# Patient Record
Sex: FEMALE | Race: WHITE | NOT HISPANIC OR LATINO | Employment: PART TIME | URBAN - METROPOLITAN AREA
[De-identification: names, ages, dates, MRNs, and addresses within clinical notes are randomized per-mention and may not be internally consistent; named-entity substitution may affect disease eponyms.]

---

## 2017-10-11 ENCOUNTER — OFFICE VISIT (OUTPATIENT)
Dept: INTERNAL MEDICINE | Facility: CLINIC | Age: 22
End: 2017-10-11
Payer: MEDICAID

## 2017-10-11 VITALS
BODY MASS INDEX: 29.37 KG/M2 | SYSTOLIC BLOOD PRESSURE: 98 MMHG | DIASTOLIC BLOOD PRESSURE: 62 MMHG | TEMPERATURE: 97.8 F | OXYGEN SATURATION: 97 % | WEIGHT: 172 LBS | HEART RATE: 84 BPM | HEIGHT: 64 IN

## 2017-10-11 DIAGNOSIS — R07.0 THROAT PAIN: ICD-10-CM

## 2017-10-11 DIAGNOSIS — J03.90 TONSILLITIS: Primary | ICD-10-CM

## 2017-10-11 LAB
DEPRECATED S PYO AG THROAT QL EIA: NORMAL
SPECIMEN SOURCE: NORMAL

## 2017-10-11 PROCEDURE — 87081 CULTURE SCREEN ONLY: CPT | Performed by: FAMILY MEDICINE

## 2017-10-11 PROCEDURE — 99213 OFFICE O/P EST LOW 20 MIN: CPT | Performed by: FAMILY MEDICINE

## 2017-10-11 PROCEDURE — 87880 STREP A ASSAY W/OPTIC: CPT | Performed by: FAMILY MEDICINE

## 2017-10-11 RX ORDER — CEPHALEXIN 500 MG/1
500 CAPSULE ORAL 3 TIMES DAILY
Qty: 30 CAPSULE | Refills: 0 | Status: SHIPPED | OUTPATIENT
Start: 2017-10-11 | End: 2017-11-16

## 2017-10-11 NOTE — NURSING NOTE
"Chief Complaint   Patient presents with     Pharyngitis     pt wants to be tested for strep has patches in her throat and is sore x 1 day       Initial BP 98/62 (BP Location: Right arm, Patient Position: Sitting, Cuff Size: Adult Large)  Pulse 84  Temp 97.8  F (36.6  C) (Oral)  Ht 5' 3.7\" (1.618 m)  Wt 172 lb (78 kg)  SpO2 97%  BMI 29.8 kg/m2 Estimated body mass index is 29.8 kg/(m^2) as calculated from the following:    Height as of this encounter: 5' 3.7\" (1.618 m).    Weight as of this encounter: 172 lb (78 kg).  Medication Reconciliation: complete    "

## 2017-10-11 NOTE — PROGRESS NOTES
"CHIEF COMPLAINT    ST      HISTORY    She has ST for 1 day. She noticed some white patches. She has some HA and body ache.     Patient Active Problem List   Diagnosis     Major depressive disorder, recurrent episode, severe (H)     Generalized anxiety disorder     Cannabis abuse     Esophageal reflux (GERD)     Current Outpatient Prescriptions   Medication Sig Dispense Refill     norethindrone-ethinyl estradiol (MICROGESTIN 1/20) 1-20 MG-MCG per tablet Take 1 tablet by mouth daily 3 Package 3       REVIEW OF SYSTEMS    No fever  No congestion  No GI upset  No rash      History reviewed. No pertinent past medical history.        EXAM  BP 98/62 (BP Location: Right arm, Patient Position: Sitting, Cuff Size: Adult Large)  Pulse 84  Temp 97.8  F (36.6  C) (Oral)  Ht 5' 3.7\" (1.618 m)  Wt 172 lb (78 kg)  SpO2 97%  BMI 29.8 kg/m2    Phar: 2+ tonsils moderately red with whitish exudate  Neck: moderate enlarged ant cervical nodes  Chest: neg  Skin: no rash    Rapid strep: neg      (J03.90) Tonsillitis  (primary encounter diagnosis)  Comment:   Plan: cephALEXin (KEFLEX) 500 MG capsule            (R07.0) Throat pain  Comment:   Plan: Strep, Rapid Screen, Beta strep group A culture            Saline gargles.  Ibu.    Patient advised to return for worsening or persistent symptoms.          "

## 2017-10-11 NOTE — MR AVS SNAPSHOT
"              After Visit Summary   10/11/2017    Azul Rojas    MRN: [id number]           Patient Information     Date Of Birth          1995        Visit Information        Provider Department      10/11/2017 4:00 PM Buck Hernandez MD [facility]        Today's Diagnoses     Tonsillitis    -  1    Throat pain           Follow-ups after your visit        Who to contact     If you have questions or need follow up information about today's clinic visit or your schedule please contact [facility] directly at [phone].  Normal or non-critical lab and imaging results will be communicated to you by [employer], letter or phone within 4 business days after the clinic has received the results. If you do not hear from us within 7 days, please contact the clinic through [employer] or phone. If you have a critical or abnormal lab result, we will notify you by phone as soon as possible.  Submit refill requests through [employer] or call your pharmacy and they will forward the refill request to us. Please allow 3 business days for your refill to be completed.          Additional Information About Your Visit        MyChart Information     [employer] lets you send messages to your doctor, view your test results, renew your prescriptions, schedule appointments and more. To sign up, go to [email or web address] . Click on \"Log in\" on the left side of the screen, which will take you to the Welcome page. Then click on \"Sign up Now\" on the right side of the page.     You will be asked to enter the access code listed below, as well as some personal information. Please follow the directions to create your username and password.     Your access code is: [email or web address]  Expires: 2018  4:44 PM     Your access code will  in 90 days. If you need help or a new code, please call your [facility] or [phone].        Care EveryWhere ID     This is your Care EveryWhere ID. This could be used " "by other organizations to access your [place] medical records  [id number]        Your Vitals Were     Pulse Temperature Height Pulse Oximetry BMI (Body Mass Index)       84 97.8  F (36.6  C) (Oral) 5' 3.7\" (1.618 m) 97% 29.8 kg/m2        Blood Pressure from Last 3 Encounters:   10/11/17 98/62   07/17/15 118/78   07/17/15 108/62    Weight from Last 3 Encounters:   10/11/17 172 lb (78 kg) (92 %)*   07/17/15 131 lb 9.6 oz (59.7 kg) (65 %)*   07/17/15 131 lb 12.8 oz (59.8 kg) (65 %)*     * Growth percentiles are based on [facility] 2-20 Years data.              We Performed the Following     Strep, Rapid Screen          Today's Medication Changes          These changes are accurate as of: 10/11/17  4:44 PM.  If you have any questions, ask your nurse or doctor.               Start taking these medicines.        Dose/Directions    cephALEXin 500 MG capsule   Commonly known as:  KEFLEX   Used for:  Tonsillitis   Started by:  Buck Hernandez MD        Dose:  500 mg   Take 1 capsule (500 mg) by mouth 3 times daily   Quantity:  30 capsule   Refills:  0            Where to get your medicines      These medications were sent to [facility] [place] [address] [place] [address] [facility] [address]     Phone:  [phone]     cephALEXin 500 MG capsule                Primary Care Provider    None Specified       No primary provider on file.        Equal Access to Services     [facility] JOHNNY AH: Hadii iris gallagher hadasho Sotorriali, waaxda luqadaha, qaybta kaalmada adeegyada, bren toussaint. So [facility] [phone].    ATENCIÓN: Si habla español, tiene a herbert disposición servicios gratuitos de asistencia lingüística. Llame al [phone].    We comply with applicable federal civil rights laws and [place] laws. We do not discriminate on the basis of race, color, national origin, age, disability, sex, sexual orientation, or gender identity.            Thank you!     Thank you " for choosing [facility]  for your care. Our goal is always to provide you with excellent care. Hearing back from our patients is one way we can continue to improve our services. Please take a few minutes to complete the written survey that you may receive in the mail after your visit with us. Thank you!             Your Updated Medication List - Protect others around you: Learn how to safely use, store and throw away your medicines at www.disposemymeds.org.          This list is accurate as of: 10/11/17  4:44 PM.  Always use your most recent med list.                   Brand Name Dispense Instructions for use Diagnosis    cephALEXin 500 MG capsule    KEFLEX    30 capsule    Take 1 capsule (500 mg) by mouth 3 times daily    Tonsillitis       norethindrone-ethinyl estradiol 1-20 MG-MCG per tablet    MICROGESTIN 1/20    3 Package    Take 1 tablet by mouth daily    Contraception

## 2017-10-12 LAB
BACTERIA SPEC CULT: NORMAL
SPECIMEN SOURCE: NORMAL

## 2017-11-16 ENCOUNTER — OFFICE VISIT (OUTPATIENT)
Dept: FAMILY MEDICINE | Facility: CLINIC | Age: 22
End: 2017-11-16
Payer: MEDICAID

## 2017-11-16 VITALS
TEMPERATURE: 97.8 F | HEART RATE: 99 BPM | DIASTOLIC BLOOD PRESSURE: 84 MMHG | SYSTOLIC BLOOD PRESSURE: 122 MMHG | BODY MASS INDEX: 28.85 KG/M2 | HEIGHT: 64 IN | WEIGHT: 169 LBS

## 2017-11-16 DIAGNOSIS — J03.90 TONSILLITIS: Primary | ICD-10-CM

## 2017-11-16 LAB
DEPRECATED S PYO AG THROAT QL EIA: NORMAL
SPECIMEN SOURCE: NORMAL

## 2017-11-16 PROCEDURE — 87081 CULTURE SCREEN ONLY: CPT | Performed by: FAMILY MEDICINE

## 2017-11-16 PROCEDURE — 99213 OFFICE O/P EST LOW 20 MIN: CPT | Performed by: FAMILY MEDICINE

## 2017-11-16 PROCEDURE — 87880 STREP A ASSAY W/OPTIC: CPT | Performed by: FAMILY MEDICINE

## 2017-11-16 RX ORDER — AMOXICILLIN AND CLAVULANATE POTASSIUM 500; 125 MG/1; MG/1
1 TABLET, FILM COATED ORAL 2 TIMES DAILY
Qty: 20 TABLET | Refills: 0 | Status: SHIPPED | OUTPATIENT
Start: 2017-11-16 | End: 2018-10-01

## 2017-11-16 NOTE — NURSING NOTE
"Chief Complaint   Patient presents with     Pharyngitis       Initial /84 (BP Location: Right arm, Patient Position: Sitting, Cuff Size: Adult Regular)  Pulse 99  Temp 97.8  F (36.6  C) (Oral)  Ht 5' 4\" (1.626 m)  Wt 169 lb (76.7 kg)  Breastfeeding? No  BMI 29.01 kg/m2 Estimated body mass index is 29.01 kg/(m^2) as calculated from the following:    Height as of this encounter: 5' 4\" (1.626 m).    Weight as of this encounter: 169 lb (76.7 kg).  Medication Reconciliation: complete     Brennon Love CMA            "

## 2017-11-16 NOTE — MR AVS SNAPSHOT
After Visit Summary   11/16/2017    Azul Rojas    MRN: [id number]           Patient Information     Date Of Birth          1995        Visit Information        Provider Department      11/16/2017 11:00 AM Linnea Day MD [facility]        Today's Diagnoses     Tonsillitis    -  1    Pharyngitis           Follow-ups after your visit        Additional Services     OTOLARYNGOLOGY REFERRAL       Your provider has referred you to: N: [facility] [phone]   [email or web address]    Please be aware that coverage of these services is subject to the terms and limitations of your health insurance plan.  Call member services at your health plan with any benefit or coverage questions.      Please bring the following with you to your appointment:    (1) Any X-Rays, CTs or MRIs which have been performed.  Contact the facility where they were done to arrange for  prior to your scheduled appointment.   (2) List of current medications  (3) This referral request   (4) Any documents/labs given to you for this referral                  Who to contact     If you have questions or need follow up information about today's clinic visit or your schedule please contact [facility] directly at [phone].  Normal or non-critical lab and imaging results will be communicated to you by MyChart, letter or phone within 4 business days after the clinic has received the results. If you do not hear from us within 7 days, please contact the clinic through MyChart or phone. If you have a critical or abnormal lab result, we will notify you by phone as soon as possible.  Submit refill requests through [employer] or call your pharmacy and they will forward the refill request to us. Please allow 3 business days for your refill to be completed.          Additional Information About Your Visit    "     MyChart Information     [employer] lets you send messages to your doctor, view your test results, renew your prescriptions, schedule appointments and more. To sign up, go to [email or web address] . Click on \"Log in\" on the left side of the screen, which will take you to the Welcome page. Then click on \"Sign up Now\" on the right side of the page.     You will be asked to enter the access code listed below, as well as some personal information. Please follow the directions to create your username and password.     Your access code is: [email or web address]  Expires: 2018  3:44 PM     Your access code will  in 90 days. If you need help or a new code, please call your [place] clinic or [phone].        Care EveryWhere ID     This is your Care EveryWhere ID. This could be used by other organizations to access your [place] medical records  [id number]        Your Vitals Were     Pulse Temperature Height Breastfeeding? BMI (Body Mass Index)       99 97.8  F (36.6  C) (Oral) 5' 4\" (1.626 m) No 29.01 kg/m2        Blood Pressure from Last 3 Encounters:   17 122/84   10/11/17 98/62   07/17/15 118/78    Weight from Last 3 Encounters:   17 169 lb (76.7 kg)   10/11/17 172 lb (78 kg) (92 %)*   07/17/15 131 lb 9.6 oz (59.7 kg) (65 %)*     * Growth percentiles are based on [facility] 2-20 Years data.              We Performed the Following     OTOLARYNGOLOGY REFERRAL     Rapid strep screen          Today's Medication Changes          These changes are accurate as of: 17 11:35 AM.  If you have any questions, ask your nurse or doctor.               Start taking these medicines.        Dose/Directions    amoxicillin-clavulanate 500-125 MG per tablet   Commonly known as:  AUGMENTIN   Used for:  Tonsillitis   Started by:  Linnea Day MD        Dose:  1 tablet   Take 1 tablet by mouth 2 times daily   Quantity:  20 tablet   Refills:  0         Stop taking these medicines if you haven't already. Please " contact your care team if you have questions.     cephALEXin 500 MG capsule   Commonly known as:  KEFLEX   Stopped by:  Linnea Day MD                Where to get your medicines      These medications were sent to [facility] - [place], [place] - [address]  [address] , [facility] [address]     Phone:  [phone]     amoxicillin-clavulanate 500-125 MG per tablet                Primary Care Provider    Physician No Ref-Primary       NO REF-PRIMARY PHYSICIAN        Equal Access to Services     WILLIE TURNER : Hadii aad ku hadasho Soomaali, waaxda luqadaha, qaybta kaalmada adeegyada, waxay idiin hayaan adeeg eliudarash lafelix . So [facility] [phone].    ATENCIÓN: Si habla español, tiene a herbert disposición servicios gratuitos de asistencia lingüística. [facility] [phone].    We comply with applicable federal civil rights laws and [place] laws. We do not discriminate on the basis of race, color, national origin, age, disability, sex, sexual orientation, or gender identity.            Thank you!     Thank you for choosing [facility]  for your care. Our goal is always to provide you with excellent care. Hearing back from our patients is one way we can continue to improve our services. Please take a few minutes to complete the written survey that you may receive in the mail after your visit with us. Thank you!             Your Updated Medication List - Protect others around you: Learn how to safely use, store and throw away your medicines at www.disposemymeds.org.          This list is accurate as of: 11/16/17 11:35 AM.  Always use your most recent med list.                   Brand Name Dispense Instructions for use Diagnosis    amoxicillin-clavulanate 500-125 MG per tablet    AUGMENTIN    20 tablet    Take 1 tablet by mouth 2 times daily    Tonsillitis       norethindrone-ethinyl estradiol 1-20 MG-MCG per tablet    MICROGESTIN 1/20    3 Package    Take 1 tablet by mouth  daily    Contraception

## 2017-11-17 LAB
BACTERIA SPEC CULT: NORMAL
SPECIMEN SOURCE: NORMAL

## 2018-10-01 ENCOUNTER — APPOINTMENT (OUTPATIENT)
Dept: GENERAL RADIOLOGY | Facility: CLINIC | Age: 23
End: 2018-10-01
Attending: NURSE PRACTITIONER
Payer: MEDICAID

## 2018-10-01 ENCOUNTER — HOSPITAL ENCOUNTER (EMERGENCY)
Facility: CLINIC | Age: 23
Discharge: HOME | End: 2018-10-01
Attending: NURSE PRACTITIONER | Admitting: NURSE PRACTITIONER
Payer: MEDICAID

## 2018-10-01 VITALS
OXYGEN SATURATION: 100 % | HEART RATE: 88 BPM | SYSTOLIC BLOOD PRESSURE: 117 MMHG | WEIGHT: 170 LBS | HEIGHT: 63 IN | TEMPERATURE: 98.3 F | RESPIRATION RATE: 20 BRPM | DIASTOLIC BLOOD PRESSURE: 84 MMHG | BODY MASS INDEX: 30.12 KG/M2

## 2018-10-01 DIAGNOSIS — J45.901 EXACERBATION OF ASTHMA, UNSPECIFIED ASTHMA SEVERITY, UNSPECIFIED WHETHER PERSISTENT: ICD-10-CM

## 2018-10-01 DIAGNOSIS — R06.2 WHEEZING: ICD-10-CM

## 2018-10-01 PROCEDURE — 71046 X-RAY EXAM CHEST 2 VIEWS: CPT

## 2018-10-01 PROCEDURE — 99284 EMERGENCY DEPT VISIT MOD MDM: CPT | Mod: 25

## 2018-10-01 PROCEDURE — 94640 AIRWAY INHALATION TREATMENT: CPT

## 2018-10-01 PROCEDURE — 25000125 ZZHC RX 250: Performed by: NURSE PRACTITIONER

## 2018-10-01 RX ORDER — IPRATROPIUM BROMIDE AND ALBUTEROL SULFATE 2.5; .5 MG/3ML; MG/3ML
3 SOLUTION RESPIRATORY (INHALATION) ONCE
Status: COMPLETED | OUTPATIENT
Start: 2018-10-01 | End: 2018-10-01

## 2018-10-01 RX ORDER — PREDNISONE 20 MG/1
40 TABLET ORAL DAILY
Qty: 10 TABLET | Refills: 0 | Status: SHIPPED | OUTPATIENT
Start: 2018-10-01 | End: 2018-10-06

## 2018-10-01 RX ORDER — PREDNISONE 20 MG/1
60 TABLET ORAL ONCE
Status: COMPLETED | OUTPATIENT
Start: 2018-10-01 | End: 2018-10-01

## 2018-10-01 RX ORDER — ALBUTEROL SULFATE 90 UG/1
2-4 AEROSOL, METERED RESPIRATORY (INHALATION)
Qty: 1 INHALER | Refills: 1 | Status: SHIPPED | OUTPATIENT
Start: 2018-10-01 | End: 2018-12-02

## 2018-10-01 RX ORDER — IPRATROPIUM BROMIDE AND ALBUTEROL SULFATE 2.5; .5 MG/3ML; MG/3ML
6 SOLUTION RESPIRATORY (INHALATION) ONCE
Status: COMPLETED | OUTPATIENT
Start: 2018-10-01 | End: 2018-10-01

## 2018-10-01 RX ADMIN — IPRATROPIUM BROMIDE AND ALBUTEROL SULFATE 3 ML: .5; 3 SOLUTION RESPIRATORY (INHALATION) at 11:13

## 2018-10-01 RX ADMIN — PREDNISONE 60 MG: 20 TABLET ORAL at 11:12

## 2018-10-01 RX ADMIN — IPRATROPIUM BROMIDE AND ALBUTEROL SULFATE 3 ML: .5; 3 SOLUTION RESPIRATORY (INHALATION) at 11:46

## 2018-10-01 ASSESSMENT — ENCOUNTER SYMPTOMS
COUGH: 1
VOMITING: 0
NAUSEA: 0
DYSURIA: 0
SHORTNESS OF BREATH: 1
FREQUENCY: 0
FEVER: 1

## 2018-10-01 NOTE — ED PROVIDER NOTES
"  History     Chief Complaint:  Trouble Breathing and Cough    HPI   Azul Rojas is a 22 year old female smoker who presents to the emergency department today for evaluation of trouble breathing and cough. The patient reports that for about 1 week she has had increasing trouble breathing, fever, and productive cough, which she states she believes it could be bronchitis or pneumonia. She notes a history of asthma and has been using her sisters inhaler but has not had relief.    Allergies:  No Known Drug Allergies    Medications:    Mucinex  Microgestin    Past Medical History:    Medical history reviewed. No pertinent medical history.    Past Surgical History:    Surgical history reviewed. No pertinent surgical history.    Family History:    Family history reviewed. No pertinent family history.     Social History:  Smoking Status: Current Every Day Smoker  Smokeless Tobacco: Former User  Alcohol Use: Negative  Marital Status:  Single     Review of Systems   Constitutional: Positive for fever (subjective).   Respiratory: Positive for cough and shortness of breath.    Cardiovascular: Negative for chest pain.   Gastrointestinal: Negative for nausea and vomiting.   Genitourinary: Negative for dysuria and frequency.   All other systems reviewed and are negative.      Physical Exam     Patient Vitals for the past 24 hrs:   BP Temp Temp src Pulse Resp SpO2 Height Weight   10/01/18 1152 117/84 - - 88 20 100 % - -   10/01/18 1145 - - - - - 100 % - -   10/01/18 1141 - - - - - 98 % - -   10/01/18 1140 (!) 125/91 - - - - - - -   10/01/18 1031 (!) 144/93 98.3  F (36.8  C) Temporal 90 16 96 % 1.6 m (5' 3\") 77.1 kg (170 lb)     Physical Exam  General: Alert, Mild  discomfort, well kept. Tearful.   Eyes: PERRL, conjunctivae pink no scleral icterus or conjunctival injection  ENT:   Moist mucus membranes, posterior oropharynx clear without erythema or exudates, No lymphadenopathy, Normal voice  Resp:  Bilateral tight wheezing " "throughout. Minimal increased work of breathing. No crackles/rubs/. Good air movement   Reevaluation: minimal wheezing, \"feels better\"  CV:  Normal rate and rhythm, no murmurs/rubs/gallops  GI:  Abdomen soft and non-distended.  Normoactive BS.  No tenderness, guarding or rebound, No masses  Skin:  Warm, dry.  No rashes or petechiae  Musculoskeletal: No peripheral edema or calf tenderness, Normal gross ROM   Neuro: Alert and oriented to person/place/time, normal sensation  Psychiatric: Normal affect, cooperative, good eye contact    Emergency Department Course   Imaging:  Radiology findings were communicated with the patient who voiced understanding of the findings.    XR Chest 2 Views  No acute pulmonary disease.  Reading per radiology     Interventions:  1112 Deltasone 60 mg PO  1113 DuoNeb 3 ml Nebulization  1146 DuoNeb 3 ml Nebulization    Emergency Department Course:    1036 Nursing notes and vitals reviewed.    1037 I performed an exam of the patient as documented above.     1115 The patient was sent for a XR Chest 2 Views while in the emergency department, results above.     1137 The patient was rechecked and updated.     1140 I personally reviewed the imaging results with the patient and answered all related questions prior to discharge.    Impression & Plan      Medical Decision Making:  Azul Rojas is a 22 year old female who presents to the emergency department today for evaluation of shortness of breath, cough, and wheezing.  Signs and symptoms are consistent with asthma exacerbation.  A broad differential was considered including foreign body, pneumonia, bronchitis, reactive airway disease, pneumothorax, cardiac equivalent, viral induced wheezing, etc. The patient had a negative chest xray. Patient feels improved after interventions here in emergency room.  There are no signs at this point of any serious etiologies including those mentioned above.  No indication for hospitalization at this time " including no hypoxia, no marked increase in respiratory rate, minimal to no retractions. Supportive outpatient management is indicated, medications for discharge noted above.  Close followup with primary care physician in the next 2-3 days if no improvement or sooner if worsening.  Return  To UR/ER if increased wheezing, progressive shortness of breath, develops fever greater than 102, or for other concerns.     Diagnosis:    ICD-10-CM    1. Exacerbation of asthma, unspecified asthma severity, unspecified whether persistent J45.901    2. Wheezing R06.2      Disposition:   The patient is discharged to home.    Discharge Medication List as of 10/1/2018 11:48 AM      START taking these medications    Details   albuterol (PROAIR HFA/PROVENTIL HFA/VENTOLIN HFA) 108 (90 Base) MCG/ACT inhaler Inhale 2-4 puffs into the lungs every 2 hours as needed for shortness of breath / dyspnea, Disp-1 Inhaler, R-1, Local PrintWITH SPACER      predniSONE (DELTASONE) 20 MG tablet Take 2 tablets (40 mg) by mouth daily for 5 days, Disp-10 tablet, R-0, Local Print           Scribe Disclosure:  Edie JUAN, am serving as a scribe at 10:41 AM on 10/1/2018 to document services personally performed by Mynor Taylor APRN CNP based on my observations and the provider's statements to me.    [facility] EMERGENCY DEPARTMENT       Mynor Taylor APRN CNP  10/01/18 9108

## 2018-10-01 NOTE — DISCHARGE INSTRUCTIONS
Discharge Instructions  Asthma    Asthma is a condition causing narrowing and inflammation of the airways that can make it hard to breathe.  Asthma can also cause cough, wheezing, noisy breathing and tightness in the chest.  Asthma can be brought on or  triggered  by many things, including dust, mold, pollen, cigarette smoke, exercise, stress and infections (like the common cold).     Generally, every Emergency Department visit should have a follow-up clinic visit with either a primary or a specialty clinic/provider. Please follow-up as instructed by your emergency provider today.    Return to the Emergency Department if:    Your breathing gets worse.    You need to use your inhaler more often than every 4 hours, or cannot get relief from your inhaler.    You are very weak, or feel much more ill.    You develop new symptoms, such as chest pain.    You cough up blood.    You are vomiting (throwing up) enough that you cannot keep fluids or your medicine down.    What can I do to help myself?    Fill any prescriptions the provider gave you and take them right away--especially antibiotics. Be sure to finish the whole antibiotic prescription.    You may be given a prescription for an inhaler, which can help loosen tight air passages.  Use this as needed, but not more often than directed. Inhalers work much better when used with a spacer.     You may be given a prescription for a steroid to reduce inflammation. Used long-term, these can have some side effects, but for short-term use they are safe. You may notice restlessness or increased appetite (eating more).        You may use non-prescription cough or cold medicines. Cough medicines may help, but do not make the cough go away completely.     Avoid smoke, because this can make you feel worse. If you smoke, this may be a good time to quit! Consider using nicotine lozenges, gum, or patches to reduce cravings.     If you have a fever, Tylenol  (acetaminophen), Motrin   (ibuprofen), or Advil  (ibuprofen), may help bring fever down and may help you feel more comfortable. Be sure to read and follow the package directions, and ask your provider if you have questions.    Be sure to get your flu shot each year.  For certain age groups, the pneumonia shot can help prevent pneumonia.  It is important that you follow up with your regular provider, to be sure that you are improving from this spell (an acute asthma exacerbation), and also to do what you can to keep from having trouble again. Sometimes you need long-term medicines to keep your asthma under control.   If you were given a prescription for medicine here today, be sure to read all of the information (including the package insert) that comes with your prescription.  This will include important information about the medicine, its side effects, and any warnings that you need to know about.  The pharmacist who fills the prescription can provide more information and answer questions you may have about the medicine.  If you have questions or concerns that the pharmacist cannot address, please call or return to the Emergency Department.   Remember that you can always come back to the Emergency Department if you are not able to see your regular provider in the amount of time listed above, if you get any new symptoms, or if there is anything that worries you.

## 2018-10-01 NOTE — ED AVS SNAPSHOT
[facility] Emergency Department    [address]    [facility] [address]    Phone:  [phone]    Fax:  [phone]                                       Azul Rojas   MRN: [id number]    Department:  [facility] Emergency Department   Date of Visit:  10/1/2018           After Visit Summary Signature Page     I have received my discharge instructions, and my questions have been answered. I have discussed any challenges I see with this plan with the nurse or doctor.    ..........................................................................................................................................  Patient/Patient Representative Signature      ..........................................................................................................................................  Patient Representative Print Name and Relationship to Patient    ..................................................               ................................................  Date                                   Time    ..........................................................................................................................................  Reviewed by Signature/Title    ...................................................              ..............................................  Date                                               Time          22EPIC Rev 08/18

## 2018-10-01 NOTE — ED AVS SNAPSHOT
[facility] Emergency Department    [address]    [facility] [address]    Phone:  [phone]    Fax:  [phone]                                       Azul Rojas   MRN: [id number]    Department:  [facility] Emergency Department   Date of Visit:  10/1/2018           Patient Information     Date Of Birth          1995        Your diagnoses for this visit were:     Exacerbation of asthma, unspecified asthma severity, unspecified whether persistent     Wheezing        You were seen by Mynor Taylor APRN CNP.      Follow-up Information     Follow up with [facility] In 5 days.    Why:  if continuned symptoms or sooner if worsening    Contact information:    [address]  [facility] [address]  [phone]          Follow up with [facility] Emergency Department.    Specialty:  EMERGENCY MEDICINE    Why:  If symptoms worsen    Contact information:    [address]  [facility] [id number] [phone]        Discharge Instructions       Discharge Instructions  Asthma    Asthma is a condition causing narrowing and inflammation of the airways that can make it hard to breathe.  Asthma can also cause cough, wheezing, noisy breathing and tightness in the chest.  Asthma can be brought on or  triggered  by many things, including dust, mold, pollen, cigarette smoke, exercise, stress and infections (like the common cold).     Generally, every Emergency Department visit should have a follow-up clinic visit with either a primary or a specialty clinic/provider. Please follow-up as instructed by your emergency provider today.    Return to the Emergency Department if:    Your breathing gets worse.    You need to use your inhaler more often than every 4 hours, or cannot get relief from your inhaler.    You are very weak, or feel much more ill.    You develop new symptoms, such as chest pain.    You cough up blood.    You are  vomiting (throwing up) enough that you cannot keep fluids or your medicine down.    What can I do to help myself?    Fill any prescriptions the provider gave you and take them right away--especially antibiotics. Be sure to finish the whole antibiotic prescription.    You may be given a prescription for an inhaler, which can help loosen tight air passages.  Use this as needed, but not more often than directed. Inhalers work much better when used with a spacer.     You may be given a prescription for a steroid to reduce inflammation. Used long-term, these can have some side effects, but for short-term use they are safe. You may notice restlessness or increased appetite (eating more).        You may use non-prescription cough or cold medicines. Cough medicines may help, but do not make the cough go away completely.     Avoid smoke, because this can make you feel worse. If you smoke, this may be a good time to quit! Consider using nicotine lozenges, gum, or patches to reduce cravings.     If you have a fever, Tylenol  (acetaminophen), Motrin  (ibuprofen), or Advil  (ibuprofen), may help bring fever down and may help you feel more comfortable. Be sure to read and follow the package directions, and ask your provider if you have questions.    Be sure to get your flu shot each year.  For certain age groups, the pneumonia shot can help prevent pneumonia.  It is important that you follow up with your regular provider, to be sure that you are improving from this spell (an acute asthma exacerbation), and also to do what you can to keep from having trouble again. Sometimes you need long-term medicines to keep your asthma under control.   If you were given a prescription for medicine here today, be sure to read all of the information (including the package insert) that comes with your prescription.  This will include important information about the medicine, its side effects, and any warnings that you need to know about.  The  pharmacist who fills the prescription can provide more information and answer questions you may have about the medicine.  If you have questions or concerns that the pharmacist cannot address, please call or return to the Emergency Department.   Remember that you can always come back to the Emergency Department if you are not able to see your regular provider in the amount of time listed above, if you get any new symptoms, or if there is anything that worries you.    24 Hour Appointment Hotline       To make an appointment at any [facility], call [phone] ([phone]). If you don't have a family doctor or clinic, we will help you find one. [facility] clinics are conveniently located to serve the needs of you and your family.             Review of your medicines      START taking        Dose / Directions Last dose taken    albuterol 108 (90 Base) MCG/ACT inhaler   Commonly known as:  PROAIR HFA/PROVENTIL HFA/VENTOLIN HFA   Dose:  2-4 puff   Quantity:  1 Inhaler        Inhale 2-4 puffs into the lungs every 2 hours as needed for shortness of breath / dyspnea   Refills:  1        predniSONE 20 MG tablet   Commonly known as:  DELTASONE   Dose:  40 mg   Quantity:  10 tablet        Take 2 tablets (40 mg) by mouth daily for 5 days   Refills:  0          Our records show that you are taking the medicines listed below. If these are incorrect, please call your family doctor or clinic.        Dose / Directions Last dose taken    dextromethorphan-guaiFENesin  MG per 12 hr tablet   Commonly known as:  MUCINEX DM   Dose:  1 tablet        Take 1 tablet by mouth every 12 hours   Refills:  0        norethindrone-ethinyl estradiol 1-20 MG-MCG per tablet   Commonly known as:  MICROGESTIN 1/20   Dose:  1 tablet   Quantity:  3 Package        Take 1 tablet by mouth daily   Refills:  3                Prescriptions were sent or printed at these locations (2 Prescriptions)                   Other Prescriptions                 Printed at Department/Unit printer (2 of 2)         albuterol (PROAIR HFA/PROVENTIL HFA/VENTOLIN HFA) 108 (90 Base) MCG/ACT inhaler               predniSONE (DELTASONE) 20 MG tablet                Procedures and tests performed during your visit     XR Chest 2 Views      Orders Needing Specimen Collection     None      Pending Results     No orders found from 9/29/2018 to 10/2/2018.            Pending Culture Results     No orders found from 9/29/2018 to 10/2/2018.            Pending Results Instructions     If you had any lab results that were not finalized at the time of your Discharge, you can call the ED Lab Result RN at [phone]. You will be contacted by this team for any positive Lab results or changes in treatment. The nurses are available 7 days a week from 10A to 6:30P.  You can leave a message 24 hours per day and they will return your call.        Test Results From Your Hospital Stay        10/1/2018 11:23 AM      Narrative     XR CHEST 2 VW 10/1/2018 11:18 AM     HISTORY: cough, sob;      COMPARISON: None available        Impression     IMPRESSION:  No acute pulmonary disease.    ROWENA JAMISON MD                Clinical Quality Measure: Blood Pressure Screening     Your blood pressure was checked while you were in the emergency department today. The last reading we obtained was  BP: (!) 125/91 . Please read the guidelines below about what these numbers mean and what you should do about them.  If your systolic blood pressure (the top number) is less than 120 and your diastolic blood pressure (the bottom number) is less than 80, then your blood pressure is normal. There is nothing more that you need to do about it.  If your systolic blood pressure (the top number) is 120-139 or your diastolic blood pressure (the bottom number) is 80-89, your blood pressure may be higher than it should be. You should have your blood pressure rechecked within a year by a primary care provider.  If your systolic blood  "pressure (the top number) is 140 or greater or your diastolic blood pressure (the bottom number) is 90 or greater, you may have high blood pressure. High blood pressure is treatable, but if left untreated over time it can put you at risk for heart attack, stroke, or kidney failure. You should have your blood pressure rechecked by a primary care provider within the next 4 weeks.  If your provider in the emergency department today gave you specific instructions to follow-up with your doctor or provider even sooner than that, you should follow that instruction and not wait for up to 4 weeks for your follow-up visit.        Thank you for choosing [facility]       Thank you for choosing [facility] for your care. Our goal is always to provide you with excellent care. Hearing back from our patients is one way we can continue to improve our services. Please take a few minutes to complete the written survey that you may receive in the mail after you visit with us. Thank you!        [employer] Information     [employer] lets you send messages to your doctor, view your test results, renew your prescriptions, schedule appointments and more. To sign up, go to [email or web address] . Click on \"Log in\" on the left side of the screen, which will take you to the Welcome page. Then click on \"Sign up Now\" on the right side of the page.     You will be asked to enter the access code listed below, as well as some personal information. Please follow the directions to create your username and password.     Your access code is: [email or web address]  Expires: 2018 11:47 AM     Your access code will  in 90 days. If you need help or a new code, please call your [facility] clinic or [phone].        Care EveryWhere ID     This is your Care EveryWhere ID. This could be used by other organizations to access your [facility] medical records  [id number]        Equal Access to Services     WILLIE TURNER : dayanara Boucher, " bren azul ah. So [facility] [phone].    ATENCIÓN: Si habla damonañol, tiene a herbert disposición servicios gratuitos de asistencia lingüística. Llame al [phone].    We comply with applicable federal civil rights laws and [place] laws. We do not discriminate on the basis of race, color, national origin, age, disability, sex, sexual orientation, or gender identity.            After Visit Summary       This is your record. Keep this with you and show to your community pharmacist(s) and doctor(s) at your next visit.

## 2018-12-02 ENCOUNTER — APPOINTMENT (OUTPATIENT)
Dept: GENERAL RADIOLOGY | Facility: CLINIC | Age: 23
End: 2018-12-02
Attending: EMERGENCY MEDICINE
Payer: MEDICAID

## 2018-12-02 ENCOUNTER — HOSPITAL ENCOUNTER (EMERGENCY)
Facility: CLINIC | Age: 23
Discharge: HOME | End: 2018-12-02
Attending: EMERGENCY MEDICINE | Admitting: EMERGENCY MEDICINE
Payer: MEDICAID

## 2018-12-02 VITALS
OXYGEN SATURATION: 99 % | SYSTOLIC BLOOD PRESSURE: 140 MMHG | TEMPERATURE: 98 F | HEART RATE: 90 BPM | DIASTOLIC BLOOD PRESSURE: 94 MMHG

## 2018-12-02 DIAGNOSIS — R07.9 ACUTE CHEST PAIN: ICD-10-CM

## 2018-12-02 DIAGNOSIS — R05.9 COUGH: ICD-10-CM

## 2018-12-02 DIAGNOSIS — R06.02 SOB (SHORTNESS OF BREATH): ICD-10-CM

## 2018-12-02 LAB
B-HCG FREE SERPL-ACNC: <5 IU/L
D DIMER PPP FEU-MCNC: 0.4 UG/ML FEU (ref 0–0.5)

## 2018-12-02 PROCEDURE — 85379 FIBRIN DEGRADATION QUANT: CPT | Performed by: EMERGENCY MEDICINE

## 2018-12-02 PROCEDURE — 71046 X-RAY EXAM CHEST 2 VIEWS: CPT

## 2018-12-02 PROCEDURE — 93005 ELECTROCARDIOGRAM TRACING: CPT

## 2018-12-02 PROCEDURE — 84702 CHORIONIC GONADOTROPIN TEST: CPT

## 2018-12-02 PROCEDURE — 99285 EMERGENCY DEPT VISIT HI MDM: CPT | Mod: 25

## 2018-12-02 RX ORDER — ALBUTEROL SULFATE 90 UG/1
2-4 AEROSOL, METERED RESPIRATORY (INHALATION)
Qty: 1 INHALER | Refills: 1 | Status: SHIPPED | OUTPATIENT
Start: 2018-12-02 | End: 2020-03-19

## 2018-12-02 RX ORDER — PREDNISONE 20 MG/1
TABLET ORAL
Qty: 10 TABLET | Refills: 0 | Status: SHIPPED | OUTPATIENT
Start: 2018-12-02 | End: 2019-11-23

## 2018-12-02 ASSESSMENT — ENCOUNTER SYMPTOMS
VOMITING: 0
FEVER: 0
SHORTNESS OF BREATH: 1
COUGH: 1
DIARRHEA: 0

## 2018-12-02 NOTE — ED AVS SNAPSHOT
[facility] Emergency Department    [address]    [facility] [address]    Phone:  [phone]    Fax:  [phone]                                       Azul Rojas   MRN: [id number]    Department:  [facility] Emergency Department   Date of Visit:  12/2/2018           Patient Information     Date Of Birth          1995        Your diagnoses for this visit were:     SOB (shortness of breath)     Cough     Acute chest pain        You were seen by Loraine Huang MD.      Follow-up Information     Follow up with [facility]. Schedule an appointment as soon as possible for a visit in 1 week.    Specialties:  Sports Medicine, Pain Management, Obstetrics & Gynecology, Pediatrics, Internal Medicine, Nephrology    Contact information:    [address] [place] [address]  [facility] [address] [phone]        Discharge Instructions       *You may resume diet and activities as tolerated.  *Take medications as prescribed.  Prednisone and albuterol as directed. Continue your current medications  *Follow-up with your primary doctor in the next 1-2 days for a recheck.  *Return if you develop difficulty in breathing, require your inhaler more frequently than every four hours, faint or feel like you will faint or become worse in any way.    Discharge Instructions  Asthma    Asthma is a condition causing narrowing and inflammation of the airways that can make it hard to breathe.  Asthma can also cause cough, wheezing, noisy breathing and tightness in the chest.  Asthma can be brought on or  triggered  by many things, including dust, mold, pollen, cigarette smoke, exercise, stress and infections (like the common cold).     Generally, every Emergency Department visit should have a follow-up clinic visit with either a primary or a specialty clinic/provider. Please follow-up as instructed by your emergency provider today.    Return to the Emergency  Department if:    Your breathing gets worse.    You need to use your inhaler more often than every 4 hours, or cannot get relief from your inhaler.    You are very weak, or feel much more ill.    You develop new symptoms, such as chest pain.    You cough up blood.    You are vomiting (throwing up) enough that you cannot keep fluids or your medicine down.    What can I do to help myself?    Fill any prescriptions the provider gave you and take them right away--especially antibiotics. Be sure to finish the whole antibiotic prescription.    You may be given a prescription for an inhaler, which can help loosen tight air passages.  Use this as needed, but not more often than directed. Inhalers work much better when used with a spacer.     You may be given a prescription for a steroid to reduce inflammation. Used long-term, these can have some side effects, but for short-term use they are safe. You may notice restlessness or increased appetite (eating more).        You may use non-prescription cough or cold medicines. Cough medicines may help, but do not make the cough go away completely.     Avoid smoke, because this can make you feel worse. If you smoke, this may be a good time to quit! Consider using nicotine lozenges, gum, or patches to reduce cravings.     If you have a fever, Tylenol  (acetaminophen), Motrin  (ibuprofen), or Advil  (ibuprofen), may help bring fever down and may help you feel more comfortable. Be sure to read and follow the package directions, and ask your provider if you have questions.    Be sure to get your flu shot each year.  For certain age groups, the pneumonia shot can help prevent pneumonia.  It is important that you follow up with your regular provider, to be sure that you are improving from this spell (an acute asthma exacerbation), and also to do what you can to keep from having trouble again. Sometimes you need long-term medicines to keep your asthma under control.   If you were given a  prescription for medicine here today, be sure to read all of the information (including the package insert) that comes with your prescription.  This will include important information about the medicine, its side effects, and any warnings that you need to know about.  The pharmacist who fills the prescription can provide more information and answer questions you may have about the medicine.  If you have questions or concerns that the pharmacist cannot address, please call or return to the Emergency Department.   Remember that you can always come back to the Emergency Department if you are not able to see your regular provider in the amount of time listed above, if you get any new symptoms, or if there is anything that worries you.      24 Hour Appointment Hotline       To make an appointment at any [facility], call [phone] ([phone]). If you don't have a family doctor or clinic, we will help you find one. [facility] clinics are conveniently located to serve the needs of you and your family.             Review of your medicines      START taking        Dose / Directions Last dose taken    predniSONE 20 MG tablet   Commonly known as:  DELTASONE   Quantity:  10 tablet        Take two tablets (= 40mg) each day for 5 (five) days   Refills:  0          Our records show that you are taking the medicines listed below. If these are incorrect, please call your family doctor or clinic.        Dose / Directions Last dose taken    albuterol 108 (90 Base) MCG/ACT inhaler   Commonly known as:  PROAIR HFA/PROVENTIL HFA/VENTOLIN HFA   Dose:  2-4 puff   Quantity:  1 Inhaler        Inhale 2-4 puffs into the lungs every 2 hours as needed for shortness of breath / dyspnea   Refills:  1        norethindrone-ethinyl estradiol 1-20 MG-MCG tablet   Commonly known as:  MICROGESTIN 1/20   Dose:  1 tablet   Quantity:  3 Package        Take 1 tablet by mouth daily   Refills:  3                Prescriptions were sent or printed  at these locations (1 Prescription)                   Other Prescriptions                Printed at Department/Unit printer (1 of 1)         predniSONE (DELTASONE) 20 MG tablet                Procedures and tests performed during your visit     Chest XR,  PA & LAT    D dimer quantitative    EKG 12 lead    ISTAT HCG Quantitative Pregnancy Nursing POCT    ISTAT HCG Quantitative Pregnancy POCT      Orders Needing Specimen Collection     None      Pending Results     No orders found from 11/30/2018 to 12/3/2018.            Pending Culture Results     No orders found from 11/30/2018 to 12/3/2018.            Pending Results Instructions     If you had any lab results that were not finalized at the time of your Discharge, you can call the ED Lab Result RN at [phone]. You will be contacted by this team for any positive Lab results or changes in treatment. The nurses are available 7 days a week from 10A to 6:30P.  You can leave a message 24 hours per day and they will return your call.        Test Results From Your Hospital Stay        12/2/2018 10:13 PM      Component Results     Component Value Ref Range & Units Status    D Dimer 0.4 0.0 - 0.50 ug/ml FEU Final    This D-dimer assay is intended for use in conjunction with a clinical pretest   probability assessment model to exclude pulmonary embolism (PE) and deep   venous thrombosis (DVT) in outpatients suspected of PE or DVT. The cut-off   value is 0.5 ug/mL FEU.           12/2/2018 10:45 PM      Narrative     CHEST TWO VIEW   12/2/2018 10:26 PM     HISTORY: Shortness of breath.     COMPARISON: 10/1/2018        Impression     IMPRESSION: Lungs are well-inflated and clear. Heart size is normal.    NICOLE NELSON MD         12/2/2018 10:08 PM      Component Results     Component Value Ref Range & Units Status    HCG Quantitative Serum <5.0 <5.0 IU/L Final                Clinical Quality Measure: Blood Pressure Screening     Your blood pressure was checked while you  "were in the emergency department today. The last reading we obtained was  BP: (!) 140/94 . Please read the guidelines below about what these numbers mean and what you should do about them.  If your systolic blood pressure (the top number) is less than 120 and your diastolic blood pressure (the bottom number) is less than 80, then your blood pressure is normal. There is nothing more that you need to do about it.  If your systolic blood pressure (the top number) is 120-139 or your diastolic blood pressure (the bottom number) is 80-89, your blood pressure may be higher than it should be. You should have your blood pressure rechecked within a year by a primary care provider.  If your systolic blood pressure (the top number) is 140 or greater or your diastolic blood pressure (the bottom number) is 90 or greater, you may have high blood pressure. High blood pressure is treatable, but if left untreated over time it can put you at risk for heart attack, stroke, or kidney failure. You should have your blood pressure rechecked by a primary care provider within the next 4 weeks.  If your provider in the emergency department today gave you specific instructions to follow-up with your doctor or provider even sooner than that, you should follow that instruction and not wait for up to 4 weeks for your follow-up visit.        Thank you for choosing [facility]       Thank you for choosing [facility] for your care. Our goal is always to provide you with excellent care. Hearing back from our patients is one way we can continue to improve our services. Please take a few minutes to complete the written survey that you may receive in the mail after you visit with us. Thank you!        [employer] Information     [employer] lets you send messages to your doctor, view your test results, renew your prescriptions, schedule appointments and more. To sign up, go to [email or web address] . Click on \"Log in\" on the left side of the screen, which will " "take you to the Welcome page. Then click on \"Sign up Now\" on the right side of the page.     You will be asked to enter the access code listed below, as well as some personal information. Please follow the directions to create your username and password.     Your access code is: [email or web address]  Expires: 2018 10:47 AM     Your access code will  in 90 days. If you need help or a new code, please call your [facility] clinic or [phone].        Care EveryWhere ID     This is your Care EveryWhere ID. This could be used by other organizations to access your [facility] medical records  [id number]        Equal Access to Services     WILLIE TURNER : Leon Miles, dayanara wilson, kalyan rahman, bren toussaint. So [facility] [phone].    ATENCIÓN: Si habla español, tiene a herbert disposición servicios gratuitos de asistencia lingüística. Llame al [phone].    We comply with applicable federal civil rights laws and [place] laws. We do not discriminate on the basis of race, color, national origin, age, disability, sex, sexual orientation, or gender identity.            After Visit Summary       This is your record. Keep this with you and show to your community pharmacist(s) and doctor(s) at your next visit.                  "

## 2018-12-02 NOTE — ED AVS SNAPSHOT
[facility] Emergency Department    [address]    [facility] [address]    Phone:  [phone]    Fax:  [phone]                                       Azul Rojas   MRN: [id number]    Department:  [facility] Emergency Department   Date of Visit:  12/2/2018           After Visit Summary Signature Page     I have received my discharge instructions, and my questions have been answered. I have discussed any challenges I see with this plan with the nurse or doctor.    ..........................................................................................................................................  Patient/Patient Representative Signature      ..........................................................................................................................................  Patient Representative Print Name and Relationship to Patient    ..................................................               ................................................  Date                                   Time    ..........................................................................................................................................  Reviewed by Signature/Title    ...................................................              ..............................................  Date                                               Time          22EPIC Rev 08/18

## 2018-12-03 LAB — INTERPRETATION ECG - MUSE: NORMAL

## 2018-12-03 NOTE — ED PROVIDER NOTES
History     Chief Complaint:  Shortness of Breath      HPI   Azul Rojas is a 23 year old female, with a history of asthma, who presents with shortness of breath which was onset today. She was in the ER about two weeks ago and was prescribed an inhaler which she has been using without only temporary relief of her symptoms. Patient is coughing up some phlegm which she states is sometimes clear and sometimes green. Patient notes chest pain with deep breaths. She notes nausea over the past week. A few of her coworkers have bronchitis her boyfriend reports that she had what appeared to be an asthma attack earlier today. Patient denies vomiting, diarrhea, leg swelling and fever. Patient asked for a minimal workup due to the fact that she doesn't have insurance and does not wish to receive a large bill.  She was comfortable with the plan for a chest x-ray, and EKG and a d-dimer.    Cardiac Risk Factors   Sex: Female   Tobacco: Negative   Hypertension: Negative  Diabetes: Negative  Hyperlipidemia: Negative  Family History: Negative    PE/DVT Risk Factors   Personal History: Negative  Recent Travel: Negative   Recent Surgery/Hospitalization: Negative  Tobacco: Negative  Family History: Negative  Hormone Use: Positive  Cancer: Negative  Trauma: Negative      Allergies:  No known drug allergies.    Medications:    Albuterol  Microgestin     Past Medical History:    Anxiety   Asthma  Depression  GERD    Past Surgical History:    History reviewed. No pertinent past surgical history.    Family History:    History reviewed. No pertinent family history.    Social History:  Presents to the ED with her friend.   Tobacco Use: Former smoker  Alcohol Use: No  PCP: Physician No Ref-Primary  Patient uses cannabis.      Review of Systems   Constitutional: Negative for fever.   Respiratory: Positive for cough and shortness of breath.    Cardiovascular: Positive for chest pain. Negative for leg swelling.   Gastrointestinal: Negative  for diarrhea and vomiting.   All other systems reviewed and are negative.      Physical Exam   First Vitals:  BP: (!) 140/94  Pulse: 90  Heart Rate: 90  Temp: 98  F (36.7  C)  SpO2: 98 %    Physical Exam  General: Well-nourished, appears to be resting comfortably when I enter the room  Eyes: PERRL, conjunctivae pink no scleral icterus or conjunctival injection  ENT:  Moist mucus membranes, posterior oropharynx clear without erythema or exudates  Respiratory:  Lungs clear to auscultation bilaterally, no crackles/rubs/wheezes.  Good air movement.  Normal work of breathing without retractions.  CV: Normal rate and rhythm, no murmurs/rubs/gallops  GI:  Abdomen soft and non-distended.  Normoactive BS.  No tenderness, guarding or rebound  Skin: Warm, dry.  No rashes or petechiae  Musculoskeletal: No peripheral edema or calf tenderness  Neuro: Alert and oriented to person/place/time  Psychiatric: Normal affect      Emergency Department Course   ECG:  @ 2155  Indication: Shortness of breath  Vent. Rate 84 bpm. ND interval 120 ms. QRS duration 80 ms. QT/QTc 374/441 ms. P-R-T axis 78 65 27.   Sinus rhythm with marked sinus arrhythmia. Otherwise normal ECG.     Read @ 2157 by Dr. Huang.    Imaging:  Radiographic findings were communicated with the patient who voiced understanding of the findings.    Chest XR,  PA & LAT   Final Result   IMPRESSION: Lungs are well-inflated and clear. Heart size is normal.      NICOLE NELSON MD        Laboratory:  D-Dimer: 0.4  HCG: <5.0    Emergency Department Course:  The patient arrived in triage where vitals were measured and recorded.   The patient was then escorted back to the emergency department.   The patient's medical records were reviewed.  Nursing notes and vitals were reviewed.  2128: I performed an exam of the patient as documented above.  The above workup was undertaken.  2240: I rechecked the patient and discussed results.  Findings and plan explained to the Patient. Patient  discharged home, status improved, with instructions regarding supportive care, medications, and reasons to return as well as the importance of close follow-up was reviewed. Patient was prescribed prednisone and an albuterol inhaler.     Impression & Plan      Medical Decision Making:  Azul Rojas is a 23 year old female who presents for evaluation of shortness of breath and report of an asthma attack earlier today.  She also has nausea with some pleuritic chest discomfort.  On my examination she is completely without signs or symptoms of a current asthma exacerbation she is not PERC negative because she uses oral contraceptives and so a d-dimer was obtained and was negative.  She is otherwise low risk for PE and I do not feel she needs additional imaging to rule this out.  Chest x-ray was obtained and shows no evidence of pneumonia, pneumothorax, cardiomegaly, congestive heart failure or other abnormality.  Vital signs are reassuring.  EKG shows no ischemic changes or tachycardia.  I suspect that her symptoms were due to asthma exacerbation and we will treat her as such.  No indication for hospitalization at this time including no hypoxia, no marked increase in respiratory rate, minimal to no retractions.   Supportive outpatient management is indicated, medications for discharge noted above.  Close followup with primary care physician.  Return if increased wheezing, progressive shortness of breath, develops fever greater than 102.      Diagnosis:    ICD-10-CM    1. SOB (shortness of breath) R06.02    2. Cough R05    3. Acute chest pain R07.9        Disposition:  Discharged to home.     Discharge Medications:  New Prescriptions    PREDNISONE (DELTASONE) 20 MG TABLET    Take two tablets (= 40mg) each day for 5 (five) days         ILeigha, am serving as a scribe on 12/2/2018 at 9:28 PM to personally document services performed by Dr. Huang based on my observations and the provider's statements to me.    [facility] EMERGENCY DEPARTMENT       Loraine Huang MD  12/03/18 0049

## 2018-12-03 NOTE — ED TRIAGE NOTES
Pt presents to ED with complaints of shortness of breath. Hx asthma. Nebbing with albuterol at home without relief. Was here a month ago and was given prednisone and albuterol. Says prednisone helped. Says she's out of her albuterol inhaler. Has not followed up with primary since no insurance. ABCs intact. A&Ox3. VSS.

## 2018-12-03 NOTE — DISCHARGE INSTRUCTIONS
*You may resume diet and activities as tolerated.  *Take medications as prescribed.  Prednisone and albuterol as directed. Continue your current medications  *Follow-up with your primary doctor in the next 1-2 days for a recheck.  *Return if you develop difficulty in breathing, require your inhaler more frequently than every four hours, faint or feel like you will faint or become worse in any way.    Discharge Instructions  Asthma    Asthma is a condition causing narrowing and inflammation of the airways that can make it hard to breathe.  Asthma can also cause cough, wheezing, noisy breathing and tightness in the chest.  Asthma can be brought on or  triggered  by many things, including dust, mold, pollen, cigarette smoke, exercise, stress and infections (like the common cold).     Generally, every Emergency Department visit should have a follow-up clinic visit with either a primary or a specialty clinic/provider. Please follow-up as instructed by your emergency provider today.    Return to the Emergency Department if:    Your breathing gets worse.    You need to use your inhaler more often than every 4 hours, or cannot get relief from your inhaler.    You are very weak, or feel much more ill.    You develop new symptoms, such as chest pain.    You cough up blood.    You are vomiting (throwing up) enough that you cannot keep fluids or your medicine down.    What can I do to help myself?    Fill any prescriptions the provider gave you and take them right away--especially antibiotics. Be sure to finish the whole antibiotic prescription.    You may be given a prescription for an inhaler, which can help loosen tight air passages.  Use this as needed, but not more often than directed. Inhalers work much better when used with a spacer.     You may be given a prescription for a steroid to reduce inflammation. Used long-term, these can have some side effects, but for short-term use they are safe. You may notice restlessness  or increased appetite (eating more).        You may use non-prescription cough or cold medicines. Cough medicines may help, but do not make the cough go away completely.     Avoid smoke, because this can make you feel worse. If you smoke, this may be a good time to quit! Consider using nicotine lozenges, gum, or patches to reduce cravings.     If you have a fever, Tylenol  (acetaminophen), Motrin  (ibuprofen), or Advil  (ibuprofen), may help bring fever down and may help you feel more comfortable. Be sure to read and follow the package directions, and ask your provider if you have questions.    Be sure to get your flu shot each year.  For certain age groups, the pneumonia shot can help prevent pneumonia.  It is important that you follow up with your regular provider, to be sure that you are improving from this spell (an acute asthma exacerbation), and also to do what you can to keep from having trouble again. Sometimes you need long-term medicines to keep your asthma under control.   If you were given a prescription for medicine here today, be sure to read all of the information (including the package insert) that comes with your prescription.  This will include important information about the medicine, its side effects, and any warnings that you need to know about.  The pharmacist who fills the prescription can provide more information and answer questions you may have about the medicine.  If you have questions or concerns that the pharmacist cannot address, please call or return to the Emergency Department.   Remember that you can always come back to the Emergency Department if you are not able to see your regular provider in the amount of time listed above, if you get any new symptoms, or if there is anything that worries you.

## 2019-11-23 ENCOUNTER — OFFICE VISIT (OUTPATIENT)
Dept: URGENT CARE | Facility: URGENT CARE | Age: 24
End: 2019-11-23
Payer: COMMERCIAL

## 2019-11-23 ENCOUNTER — TELEPHONE (OUTPATIENT)
Dept: FAMILY MEDICINE | Facility: CLINIC | Age: 24
End: 2019-11-23

## 2019-11-23 VITALS
HEIGHT: 63 IN | WEIGHT: 187 LBS | OXYGEN SATURATION: 96 % | TEMPERATURE: 98 F | BODY MASS INDEX: 33.13 KG/M2 | HEART RATE: 96 BPM

## 2019-11-23 DIAGNOSIS — R06.02 SHORTNESS OF BREATH: Primary | ICD-10-CM

## 2019-11-23 PROCEDURE — 99214 OFFICE O/P EST MOD 30 MIN: CPT | Mod: 25 | Performed by: FAMILY MEDICINE

## 2019-11-23 PROCEDURE — 94640 AIRWAY INHALATION TREATMENT: CPT | Performed by: FAMILY MEDICINE

## 2019-11-23 RX ORDER — ALBUTEROL SULFATE 90 UG/1
2 AEROSOL, METERED RESPIRATORY (INHALATION) EVERY 6 HOURS
Qty: 1 INHALER | Refills: 0 | Status: SHIPPED | OUTPATIENT
Start: 2019-11-23 | End: 2020-03-19

## 2019-11-23 RX ORDER — AZITHROMYCIN 250 MG/1
TABLET, FILM COATED ORAL
Qty: 6 TABLET | Refills: 0 | Status: SHIPPED | OUTPATIENT
Start: 2019-11-23 | End: 2020-03-19

## 2019-11-23 RX ORDER — IPRATROPIUM BROMIDE AND ALBUTEROL SULFATE 2.5; .5 MG/3ML; MG/3ML
1 SOLUTION RESPIRATORY (INHALATION) EVERY 6 HOURS PRN
Qty: 1 BOX | Refills: 1 | Status: SHIPPED | OUTPATIENT
Start: 2019-11-23 | End: 2020-03-13

## 2019-11-23 RX ORDER — IPRATROPIUM BROMIDE AND ALBUTEROL SULFATE 2.5; .5 MG/3ML; MG/3ML
1 SOLUTION RESPIRATORY (INHALATION) EVERY 6 HOURS PRN
Qty: 1 BOX | Refills: 0 | Status: SHIPPED | OUTPATIENT
Start: 2019-11-23 | End: 2020-03-13

## 2019-11-23 RX ORDER — IPRATROPIUM BROMIDE AND ALBUTEROL SULFATE 2.5; .5 MG/3ML; MG/3ML
3 SOLUTION RESPIRATORY (INHALATION) ONCE
Status: COMPLETED | OUTPATIENT
Start: 2019-11-23 | End: 2019-11-23

## 2019-11-23 RX ORDER — PREDNISONE 20 MG/1
40 TABLET ORAL DAILY
Qty: 10 TABLET | Refills: 0 | Status: SHIPPED | OUTPATIENT
Start: 2019-11-23 | End: 2020-03-13

## 2019-11-23 RX ADMIN — IPRATROPIUM BROMIDE AND ALBUTEROL SULFATE 3 ML: 2.5; .5 SOLUTION RESPIRATORY (INHALATION) at 09:50

## 2019-11-23 ASSESSMENT — ENCOUNTER SYMPTOMS
SHORTNESS OF BREATH: 1
COUGH: 1

## 2019-11-23 ASSESSMENT — MIFFLIN-ST. JEOR: SCORE: 1577.36

## 2019-11-23 NOTE — TELEPHONE ENCOUNTER
Patient calling and states she is having trouble breathing.  Has asthma.  Feels like lungs are congested and hurts to take a deep breath for a year or more but worse in the last week.  Denies URI at this time.  Does have rescue inhaler.  Advised UC today for worsening symptoms.  Patient agrees to be seen at [facility].  Kelley Salguero RN

## 2019-11-23 NOTE — PROGRESS NOTES
SUBJECTIVE:   Azul Rojas is a 24 year old female presenting with a chief complaint of   Chief Complaint   Patient presents with     Urgent Care     Dyspnea ongiong for 2 weeks    She has a history of asthma and not on any medications at this time did not have insurance for the longest time.    She is now on her father's insurance she is been having some shortness of breath over the last couple of weeks.  Does have a nebulizer at home no medication for      Does not have a primary care    Feels a sense of tightness and shortness of breath.    Has used a number of medications in the past for asthma with good results.  No fever no chills      Cough has been productive    She is an established patient of [facility].        Review of Systems   Respiratory: Positive for cough and shortness of breath.    All other systems reviewed and are negative.      History reviewed. No pertinent past medical history.  Family History   Problem Relation Age of Onset     Family History Negative Mother      Current Outpatient Medications   Medication Sig Dispense Refill     albuterol (PROAIR HFA/PROVENTIL HFA/VENTOLIN HFA) 108 (90 Base) MCG/ACT inhaler Inhale 2 puffs into the lungs every 6 hours 1 Inhaler 0     albuterol (PROAIR HFA/PROVENTIL HFA/VENTOLIN HFA) 108 (90 Base) MCG/ACT inhaler Inhale 2-4 puffs into the lungs every 2 hours as needed for shortness of breath / dyspnea 1 Inhaler 1     azithromycin (ZITHROMAX) 250 MG tablet Take 2 tablets (500 mg) by mouth daily for 1 day, THEN 1 tablet (250 mg) daily for 4 days. 6 tablet 0     ipratropium - albuterol 0.5 mg/2.5 mg/3 mL (DUONEB) 0.5-2.5 (3) MG/3ML neb solution Take 1 vial (3 mLs) by nebulization every 6 hours as needed for shortness of breath / dyspnea or wheezing 1 Box 1     ipratropium - albuterol 0.5 mg/2.5 mg/3 mL (DUONEB) 0.5-2.5 (3) MG/3ML neb solution Take 1 vial (3 mLs) by nebulization every 6 hours as needed for shortness of breath / dyspnea or wheezing 1 Box 0      "norethindrone-ethinyl estradiol (MICROGESTIN 1/20) 1-20 MG-MCG per tablet Take 1 tablet by mouth daily 3 Package 3     predniSONE (DELTASONE) 20 MG tablet Take 2 tablets (40 mg) by mouth daily 10 tablet 0     Social History     Tobacco Use     Smoking status: Former Smoker     Smokeless tobacco: Former User   Substance Use Topics     Alcohol use: No       OBJECTIVE  Pulse 96   Temp 98  F (36.7  C) (Oral)   Ht 1.6 m (5' 3\")   Wt 84.8 kg (187 lb)   SpO2 96%   BMI 33.13 kg/m      Physical Exam  Vitals signs and nursing note reviewed.   HENT:      Head: Normocephalic.      Right Ear: Tympanic membrane normal.      Left Ear: Tympanic membrane normal.      Nose:      Comments: Congested     Mouth/Throat:      Mouth: Mucous membranes are moist.   Eyes:      Extraocular Movements: Extraocular movements intact.   Neck:      Musculoskeletal: Normal range of motion.   Cardiovascular:      Rate and Rhythm: Normal rate and regular rhythm.      Heart sounds: Normal heart sounds.   Pulmonary:      Comments: Decreased breath sounds bilaterally before nebulizing treatment.  After nebulizing treatment she had good excursion of her lungs and more air oxygen saturations improved  Abdominal:      General: Abdomen is flat.   Musculoskeletal: Normal range of motion.   Neurological:      Mental Status: She is alert.   Psychiatric:         Mood and Affect: Mood normal.         Labs:  No results found for this or any previous visit (from the past 24 hour(s)).    X-Ray was not done.    ASSESSMENT:      ICD-10-CM    1. Shortness of breath R06.02 ipratropium - albuterol 0.5 mg/2.5 mg/3 mL (DUONEB) neb solution 3 mL     INHALATION/NEBULIZER TREATMENT, INITIAL     ipratropium - albuterol 0.5 mg/2.5 mg/3 mL (DUONEB) 0.5-2.5 (3) MG/3ML neb solution     ipratropium - albuterol 0.5 mg/2.5 mg/3 mL (DUONEB) 0.5-2.5 (3) MG/3ML neb solution     albuterol (PROAIR HFA/PROVENTIL HFA/VENTOLIN HFA) 108 (90 Base) MCG/ACT inhaler     azithromycin " (ZITHROMAX) 250 MG tablet     predniSONE (DELTASONE) 20 MG tablet      Asthma exacerbation2.  Good improvement with a DuoNeb  Medical Decision Making:    Differential Diagnosis:  Asthma, bronchitis, pneumonia, upper respiratory infection, bronchospasm, asthmatic bronchitis    Serious Comorbid Conditions:  Adult:  Asthma    PLAN:  1.   Azithromycin for 5 days.  2.  Prednisone for 5 days.  3.  DuoNeb's.  4.  Follow-up with the next week to 2 weeks        Followup:    I encouraged her to over the next 2 to 4 weeks to find a primary care we gave her a list of primary care providers here at the [facility].

## 2019-12-26 ENCOUNTER — OFFICE VISIT (OUTPATIENT)
Dept: URGENT CARE | Facility: URGENT CARE | Age: 24
End: 2019-12-26
Payer: COMMERCIAL

## 2019-12-26 VITALS
SYSTOLIC BLOOD PRESSURE: 128 MMHG | WEIGHT: 187 LBS | RESPIRATION RATE: 16 BRPM | HEIGHT: 63 IN | DIASTOLIC BLOOD PRESSURE: 80 MMHG | BODY MASS INDEX: 33.13 KG/M2 | OXYGEN SATURATION: 99 % | TEMPERATURE: 98.4 F | HEART RATE: 72 BPM

## 2019-12-26 DIAGNOSIS — Z30.41 ORAL CONTRACEPTIVE USE: ICD-10-CM

## 2019-12-26 DIAGNOSIS — J45.41 MODERATE PERSISTENT ASTHMATIC BRONCHITIS WITH ACUTE EXACERBATION: Primary | ICD-10-CM

## 2019-12-26 PROCEDURE — 99214 OFFICE O/P EST MOD 30 MIN: CPT | Mod: 25 | Performed by: PHYSICIAN ASSISTANT

## 2019-12-26 PROCEDURE — 94640 AIRWAY INHALATION TREATMENT: CPT | Performed by: PHYSICIAN ASSISTANT

## 2019-12-26 RX ORDER — FLUTICASONE PROPIONATE 110 UG/1
1 AEROSOL, METERED RESPIRATORY (INHALATION) 2 TIMES DAILY
Qty: 12 G | Refills: 3 | Status: SHIPPED | OUTPATIENT
Start: 2019-12-26 | End: 2020-03-19

## 2019-12-26 RX ORDER — PREDNISONE 20 MG/1
40 TABLET ORAL DAILY
Qty: 10 TABLET | Refills: 0 | Status: SHIPPED | OUTPATIENT
Start: 2019-12-26 | End: 2020-03-19

## 2019-12-26 RX ORDER — NORETHINDRONE ACETATE AND ETHINYL ESTRADIOL .02; 1 MG/1; MG/1
1 TABLET ORAL DAILY
Status: CANCELLED | OUTPATIENT
Start: 2019-12-26

## 2019-12-26 RX ORDER — AZITHROMYCIN 250 MG/1
TABLET, FILM COATED ORAL
Qty: 6 TABLET | Refills: 0 | Status: SHIPPED | OUTPATIENT
Start: 2019-12-26 | End: 2020-03-19

## 2019-12-26 RX ORDER — NORETHINDRONE ACETATE AND ETHINYL ESTRADIOL 1MG-20(21)
1 KIT ORAL DAILY
Qty: 56 TABLET | Refills: 0 | Status: SHIPPED | OUTPATIENT
Start: 2019-12-26 | End: 2020-11-16

## 2019-12-26 RX ORDER — IPRATROPIUM BROMIDE AND ALBUTEROL SULFATE 2.5; .5 MG/3ML; MG/3ML
1 SOLUTION RESPIRATORY (INHALATION) EVERY 6 HOURS PRN
Qty: 1 BOX | Refills: 1 | Status: SHIPPED | OUTPATIENT
Start: 2019-12-26 | End: 2020-03-13

## 2019-12-26 RX ORDER — IPRATROPIUM BROMIDE AND ALBUTEROL SULFATE 2.5; .5 MG/3ML; MG/3ML
3 SOLUTION RESPIRATORY (INHALATION) ONCE
Status: COMPLETED | OUTPATIENT
Start: 2019-12-26 | End: 2019-12-26

## 2019-12-26 RX ADMIN — IPRATROPIUM BROMIDE AND ALBUTEROL SULFATE 3 ML: 2.5; .5 SOLUTION RESPIRATORY (INHALATION) at 21:32

## 2019-12-26 ASSESSMENT — MIFFLIN-ST. JEOR: SCORE: 1577.36

## 2019-12-27 NOTE — PROGRESS NOTES
SUBJECTIVE:   Azul Rojas is a 24 year old female presenting with a chief complaint of   Chief Complaint   Patient presents with     Asthma     hard to breath, hard to take deep breath, wheezing x 6months and getting worse       She is an established patient of [facility].    Breathing problem    Onset of symptoms was 6 month(s) ago.  Course of illness is worsening.    Severity moderately severe  Current and Associated symptoms:shortness of breath, wheezing, chest tightness.  Treatment measures tried include Inhaler (name: albuterol).  Predisposing factors include HX of asthma. She has been without insurance for several months so was not able to see a provider until one month ago. She was seen here in urgent care and prescribed albuterol inhaler, zithromax, prednisone. recommended Duo neb treatment. Symptoms were improved after treatment however she reports for the past couple days she is using her albuterol inhaler more than 6 times a day. She is starting to feel very short of breath again. No chest pain. No Hemoptysis.  She used to be on Flovent as maintenance inhaler. Has not used it a very long time. She does not have a primary care provider yet.   She denies any fever or chills.     She would also like a refill on her oral contraceptive today.         Review of Systems   Constitutional: Negative for chills and fever.   HENT: Negative for sore throat.    Respiratory: Positive for cough, chest tightness, shortness of breath and wheezing.    Gastrointestinal: Negative for diarrhea, nausea and vomiting.   Neurological: Negative for headaches.       History reviewed. No pertinent past medical history.  Family History   Problem Relation Age of Onset     Family History Negative Mother      Current Outpatient Medications   Medication Sig Dispense Refill     albuterol (PROAIR HFA/PROVENTIL HFA/VENTOLIN HFA) 108 (90 Base) MCG/ACT inhaler Inhale 2-4 puffs into the lungs every 2 hours as needed for shortness of breath /  "dyspnea 1 Inhaler 1     fluticasone (FLOVENT HFA) 110 MCG/ACT inhaler Inhale 1 puff into the lungs 2 times daily 12 g 3     ipratropium - albuterol 0.5 mg/2.5 mg/3 mL (DUONEB) 0.5-2.5 (3) MG/3ML neb solution Take 1 vial (3 mLs) by nebulization every 6 hours as needed for shortness of breath / dyspnea or wheezing 1 Box 1     ipratropium - albuterol 0.5 mg/2.5 mg/3 mL (DUONEB) 0.5-2.5 (3) MG/3ML neb solution Take 1 vial (3 mLs) by nebulization every 6 hours as needed for shortness of breath / dyspnea or wheezing 1 Box 1     norethindrone-ethinyl estradiol (JUNEL FE 1/20) 1-20 MG-MCG tablet Take 1 tablet by mouth daily 56 tablet 0     norethindrone-ethinyl estradiol (MICROGESTIN 1/20) 1-20 MG-MCG per tablet Take 1 tablet by mouth daily 3 Package 3     albuterol (PROAIR HFA/PROVENTIL HFA/VENTOLIN HFA) 108 (90 Base) MCG/ACT inhaler Inhale 2 puffs into the lungs every 6 hours (Patient not taking: Reported on 12/26/2019) 1 Inhaler 0     ipratropium - albuterol 0.5 mg/2.5 mg/3 mL (DUONEB) 0.5-2.5 (3) MG/3ML neb solution Take 1 vial (3 mLs) by nebulization every 6 hours as needed for shortness of breath / dyspnea or wheezing (Patient not taking: Reported on 12/26/2019) 1 Box 0     predniSONE (DELTASONE) 20 MG tablet Take 2 tablets (40 mg) by mouth daily (Patient not taking: Reported on 12/26/2019) 10 tablet 0     Social History     Tobacco Use     Smoking status: Former Smoker     Smokeless tobacco: Former User   Substance Use Topics     Alcohol use: No       OBJECTIVE  /80 (BP Location: Right arm, Patient Position: Chair, Cuff Size: Adult Regular)   Pulse 72   Temp 98.4  F (36.9  C) (Oral)   Resp 16   Ht 1.6 m (5' 3\")   Wt 84.8 kg (187 lb)   LMP 11/28/2019 (Approximate)   SpO2 99%   Breastfeeding No   BMI 33.13 kg/m      Physical Exam  Constitutional:       General: She is not in acute distress.     Appearance: She is well-developed.   HENT:      Head: Normocephalic and atraumatic.      Right Ear: Tympanic " membrane and external ear normal.      Left Ear: Tympanic membrane and external ear normal.      Nose: Nose normal.      Mouth/Throat:      Mouth: Mucous membranes are moist.      Pharynx: Oropharynx is clear.   Eyes:      Conjunctiva/sclera: Conjunctivae normal.   Neck:      Musculoskeletal: Normal range of motion.   Cardiovascular:      Rate and Rhythm: Regular rhythm.      Heart sounds: Normal heart sounds.   Pulmonary:      Effort: Pulmonary effort is normal. No respiratory distress.      Breath sounds: No stridor. Wheezing and rhonchi present. No rales.   Skin:     General: Skin is warm and dry.   Neurological:      Mental Status: She is alert.         Labs:  No results found for this or any previous visit (from the past 24 hour(s)).        ASSESSMENT:      ICD-10-CM    1. Moderate persistent asthmatic bronchitis with acute exacerbation J45.41 predniSONE (DELTASONE) 20 MG tablet     ipratropium - albuterol 0.5 mg/2.5 mg/3 mL (DUONEB) 0.5-2.5 (3) MG/3ML neb solution     fluticasone (FLOVENT HFA) 110 MCG/ACT inhaler     azithromycin (ZITHROMAX) 250 MG tablet     ipratropium - albuterol 0.5 mg/2.5 mg/3 mL (DUONEB) neb solution 3 mL     INHALATION/NEBULIZER TREATMENT, INITIAL   2. Oral contraceptive use Z30.41 norethindrone-ethinyl estradiol (JUNEL FE 1/20) 1-20 MG-MCG tablet          PLAN:    Asthmatic bronchitis: she is given a duo neb treatment in urgent care which helped with her chest tightness. Prednisone Rx, Zithromax Rx sent to pharmacy. We have discussed restarting her maintenance inhaler. Flovent inhaler Rx sent to pharmacy. I have refilled her duo neb solution and recommended duo neb treatment over the next couple days. Discussed red flag symptoms and when to seek emergent care. Follow up sooner if any worsening symptoms. Patient agrees.      Refill of oral contraceptive: norethindrone-ethinyl estradiol Rx sent to pharmacy. Dicussed the need to establish care with primary care for further refills.  Patient agrees.     Followup:    If not improving or if condition worsens, follow up with your Primary Care Provider

## 2019-12-27 NOTE — NURSING NOTE
Clinic Administered Medication Documentation    MEDICATION LIST: Inhalable/Nebs Medication Documentation    Patient was given Ipratropium-Albuterol Neb. Prior to medication administration, verified patients identity using patient s name and date of birth. Please see MAR and medication order for additional information.     Started at 930pm lot [id number], ndc [id number] exp 6/30/2021.Melvin Kim CMA

## 2020-01-02 ASSESSMENT — ENCOUNTER SYMPTOMS
WHEEZING: 1
VOMITING: 0
COUGH: 1
NAUSEA: 0
DIARRHEA: 0
SHORTNESS OF BREATH: 1
CHEST TIGHTNESS: 1
HEADACHES: 0
FEVER: 0
CHILLS: 0
SORE THROAT: 0

## 2020-03-13 ENCOUNTER — HOSPITAL ENCOUNTER (EMERGENCY)
Facility: CLINIC | Age: 25
Discharge: HOME | End: 2020-03-13
Attending: EMERGENCY MEDICINE | Admitting: EMERGENCY MEDICINE
Payer: COMMERCIAL

## 2020-03-13 VITALS
RESPIRATION RATE: 20 BRPM | OXYGEN SATURATION: 98 % | TEMPERATURE: 98.1 F | HEART RATE: 75 BPM | SYSTOLIC BLOOD PRESSURE: 129 MMHG | DIASTOLIC BLOOD PRESSURE: 78 MMHG

## 2020-03-13 DIAGNOSIS — R79.89 ELEVATED D-DIMER: ICD-10-CM

## 2020-03-13 DIAGNOSIS — J45.901 EXACERBATION OF ASTHMA, UNSPECIFIED ASTHMA SEVERITY, UNSPECIFIED WHETHER PERSISTENT: ICD-10-CM

## 2020-03-13 LAB
ALBUMIN SERPL-MCNC: 4 G/DL (ref 3.4–5)
ALP SERPL-CCNC: 70 U/L (ref 40–150)
ALT SERPL W P-5'-P-CCNC: 39 U/L (ref 0–50)
ANION GAP SERPL CALCULATED.3IONS-SCNC: 5 MMOL/L (ref 3–14)
AST SERPL W P-5'-P-CCNC: 24 U/L (ref 0–45)
BASOPHILS # BLD AUTO: 0 10E9/L (ref 0–0.2)
BASOPHILS NFR BLD AUTO: 0.6 %
BILIRUB SERPL-MCNC: 0.4 MG/DL (ref 0.2–1.3)
BUN SERPL-MCNC: 7 MG/DL (ref 7–30)
CALCIUM SERPL-MCNC: 8.6 MG/DL (ref 8.5–10.1)
CHLORIDE SERPL-SCNC: 108 MMOL/L (ref 94–109)
CO2 SERPL-SCNC: 26 MMOL/L (ref 20–32)
CREAT SERPL-MCNC: 0.64 MG/DL (ref 0.52–1.04)
D DIMER PPP FEU-MCNC: 0.8 UG/ML FEU (ref 0–0.5)
DIFFERENTIAL METHOD BLD: NORMAL
EOSINOPHIL # BLD AUTO: 0.3 10E9/L (ref 0–0.7)
EOSINOPHIL NFR BLD AUTO: 5.8 %
ERYTHROCYTE [DISTWIDTH] IN BLOOD BY AUTOMATED COUNT: 12.6 % (ref 10–15)
FLUAV+FLUBV AG SPEC QL: NEGATIVE
FLUAV+FLUBV AG SPEC QL: NEGATIVE
GFR SERPL CREATININE-BSD FRML MDRD: >90 ML/MIN/{1.73_M2}
GLUCOSE SERPL-MCNC: 85 MG/DL (ref 70–99)
HCT VFR BLD AUTO: 44.4 % (ref 35–47)
HGB BLD-MCNC: 14.1 G/DL (ref 11.7–15.7)
IMM GRANULOCYTES # BLD: 0 10E9/L (ref 0–0.4)
IMM GRANULOCYTES NFR BLD: 0.2 %
LYMPHOCYTES # BLD AUTO: 1.6 10E9/L (ref 0.8–5.3)
LYMPHOCYTES NFR BLD AUTO: 32 %
MCH RBC QN AUTO: 28.4 PG (ref 26.5–33)
MCHC RBC AUTO-ENTMCNC: 31.8 G/DL (ref 31.5–36.5)
MCV RBC AUTO: 90 FL (ref 78–100)
MONOCYTES # BLD AUTO: 0.6 10E9/L (ref 0–1.3)
MONOCYTES NFR BLD AUTO: 12.6 %
NEUTROPHILS # BLD AUTO: 2.4 10E9/L (ref 1.6–8.3)
NEUTROPHILS NFR BLD AUTO: 48.8 %
NRBC # BLD AUTO: 0 10*3/UL
NRBC BLD AUTO-RTO: 0 /100
PLATELET # BLD AUTO: 151 10E9/L (ref 150–450)
PLATELET # BLD EST: NORMAL 10*3/UL
POTASSIUM SERPL-SCNC: 3.4 MMOL/L (ref 3.4–5.3)
PROT SERPL-MCNC: 7.7 G/DL (ref 6.8–8.8)
RBC # BLD AUTO: 4.96 10E12/L (ref 3.8–5.2)
RBC MORPH BLD: NORMAL
SODIUM SERPL-SCNC: 139 MMOL/L (ref 133–144)
SPECIMEN SOURCE: NORMAL
TROPONIN I SERPL-MCNC: <0.015 UG/L (ref 0–0.04)
WBC # BLD AUTO: 5 10E9/L (ref 4–11)

## 2020-03-13 PROCEDURE — 93005 ELECTROCARDIOGRAM TRACING: CPT

## 2020-03-13 PROCEDURE — 25000128 H RX IP 250 OP 636: Performed by: EMERGENCY MEDICINE

## 2020-03-13 PROCEDURE — 84484 ASSAY OF TROPONIN QUANT: CPT | Performed by: EMERGENCY MEDICINE

## 2020-03-13 PROCEDURE — 96375 TX/PRO/DX INJ NEW DRUG ADDON: CPT

## 2020-03-13 PROCEDURE — 87804 INFLUENZA ASSAY W/OPTIC: CPT | Performed by: EMERGENCY MEDICINE

## 2020-03-13 PROCEDURE — 99284 EMERGENCY DEPT VISIT MOD MDM: CPT | Mod: 25

## 2020-03-13 PROCEDURE — 96361 HYDRATE IV INFUSION ADD-ON: CPT

## 2020-03-13 PROCEDURE — 96374 THER/PROPH/DIAG INJ IV PUSH: CPT

## 2020-03-13 PROCEDURE — 85025 COMPLETE CBC W/AUTO DIFF WBC: CPT | Performed by: EMERGENCY MEDICINE

## 2020-03-13 PROCEDURE — 85379 FIBRIN DEGRADATION QUANT: CPT | Performed by: EMERGENCY MEDICINE

## 2020-03-13 PROCEDURE — 80053 COMPREHEN METABOLIC PANEL: CPT | Performed by: EMERGENCY MEDICINE

## 2020-03-13 PROCEDURE — 25800030 ZZH RX IP 258 OP 636: Performed by: EMERGENCY MEDICINE

## 2020-03-13 RX ORDER — IPRATROPIUM BROMIDE AND ALBUTEROL SULFATE 2.5; .5 MG/3ML; MG/3ML
1 SOLUTION RESPIRATORY (INHALATION) EVERY 4 HOURS PRN
Qty: 1 BOX | Refills: 0 | Status: SHIPPED | OUTPATIENT
Start: 2020-03-13 | End: 2020-03-19

## 2020-03-13 RX ORDER — PREDNISONE 20 MG/1
TABLET ORAL
Qty: 10 TABLET | Refills: 0 | Status: SHIPPED | OUTPATIENT
Start: 2020-03-13 | End: 2020-03-18

## 2020-03-13 RX ORDER — METHYLPREDNISOLONE SODIUM SUCCINATE 125 MG/2ML
125 INJECTION, POWDER, LYOPHILIZED, FOR SOLUTION INTRAMUSCULAR; INTRAVENOUS ONCE
Status: COMPLETED | OUTPATIENT
Start: 2020-03-13 | End: 2020-03-13

## 2020-03-13 RX ORDER — KETOROLAC TROMETHAMINE 15 MG/ML
15 INJECTION, SOLUTION INTRAMUSCULAR; INTRAVENOUS ONCE
Status: COMPLETED | OUTPATIENT
Start: 2020-03-13 | End: 2020-03-13

## 2020-03-13 RX ADMIN — KETOROLAC TROMETHAMINE 15 MG: 15 INJECTION, SOLUTION INTRAMUSCULAR; INTRAVENOUS at 14:36

## 2020-03-13 RX ADMIN — METHYLPREDNISOLONE SODIUM SUCCINATE 125 MG: 125 INJECTION, POWDER, FOR SOLUTION INTRAMUSCULAR; INTRAVENOUS at 14:38

## 2020-03-13 RX ADMIN — SODIUM CHLORIDE 1000 ML: 9 INJECTION, SOLUTION INTRAVENOUS at 14:36

## 2020-03-13 ASSESSMENT — ENCOUNTER SYMPTOMS
FEVER: 1
SHORTNESS OF BREATH: 1
CHILLS: 1
COUGH: 1

## 2020-03-13 NOTE — DISCHARGE INSTRUCTIONS
Start steroid today  You have an elevated d dimer. You need to get a CT of your chest to rule out a blood clot. You can still return to ER to get this done if you change your mind  Do your nebs every 4 hours until better  See your primary doctor on monday

## 2020-03-13 NOTE — ED TRIAGE NOTES
Pt c/o shortness of breath with activity for the last week, with chest tightness.  Also c/o heaedache

## 2020-03-13 NOTE — ED PROVIDER NOTES
History     Chief Complaint:  Shortness of Breath; Chest Pain       The history is provided by the patient.      Azul Rojas is a 24 year old female who presents with one week of shortness of breath and chest pain. Patient does have a diagnosis of asthma and has been using DuoNeb nebules at home as well as doing a once a day steroid inhaler. She is on Fluticasone. She has not been in to see a primary doctor since the end of December. She does not have any regular providers to check in on her asthma. She states that she has been having intermittent subjective fevers and chills, but no actual measured temperatures. There has been a mild cough without any sputum production. Her significant other who presents with her states that he is also having URI symptoms and may have given them to her. She denies nay other sick contacts or foreign travel or known contacts with COVID-19. She did state that she has been doing her DuoNeb 2-3 times a day, but not consistently. She denies being a smoker or being around anyone who smokes.     Allergies:  No Known Drug Allergies    Medications:    Albuterol inhaler  Fluticasone inhaler  Norethindrone-ethinyl estradiol    Past Medical History:    History reviewed. No pertinent past medical history.    Past Surgical History:    History reviewed.  No pertinent past surgical history.    Family History:    History reviewed. No pertinent family history.    Social History:  The patient presents to the ED with her significant other.  Smoking Status: Former Smoker  Smokeless Tobacco: Former User  Alcohol Use: No  Drug Use: No  PCP: No Ref-Primary, Physician     Review of Systems   Constitutional: Positive for chills and fever (subjective).   Respiratory: Positive for cough (productive) and shortness of breath.    Cardiovascular: Positive for chest pain.   All other systems reviewed and are negative.      Physical Exam     Patient Vitals for the past 24 hrs:   BP Temp Temp src Pulse Heart  Rate Resp SpO2   03/13/20 1515 121/74 98.1  F (36.7  C) Oral 85 70 20 97 %   03/13/20 1445 102/63 -- -- 71 -- -- 98 %   03/13/20 1430 125/59 -- -- -- -- -- --   03/13/20 1415 -- -- -- -- -- -- 98 %   03/13/20 1303 (!) 135/97 98.6  F (37  C) -- 80 80 20 100 %       Physical Exam  Constitutional:       Appearance: She is well-developed.   HENT:      Right Ear: Tympanic membrane and external ear normal.      Left Ear: Tympanic membrane and external ear normal.      Nose: Nose normal.      Mouth/Throat:      Mouth: Mucous membranes are moist.      Pharynx: Oropharynx is clear. No oropharyngeal exudate or posterior oropharyngeal erythema.   Eyes:      General: No scleral icterus.     Extraocular Movements: Extraocular movements intact.      Conjunctiva/sclera: Conjunctivae normal.      Pupils: Pupils are equal, round, and reactive to light.   Neck:      Musculoskeletal: Normal range of motion and neck supple.      Vascular: No JVD.   Cardiovascular:      Rate and Rhythm: Normal rate and regular rhythm.      Pulses: Normal pulses.      Heart sounds: Normal heart sounds. No murmur. No friction rub. No gallop.    Pulmonary:      Effort: Pulmonary effort is normal. No respiratory distress.      Breath sounds: Normal breath sounds. No wheezing or rales.   Abdominal:      General: Bowel sounds are normal. There is no distension.      Palpations: Abdomen is soft. There is no mass.      Tenderness: There is no abdominal tenderness.   Musculoskeletal: Normal range of motion.   Skin:     General: Skin is warm and dry.      Findings: No rash.   Neurological:      Mental Status: She is alert.   Psychiatric:         Mood and Affect: Mood normal.           Emergency Department Course     ECG:  Indication: Chest Pain  Time: 1315  Vent. Rate 66 bpm. AZ interval 130. QRS duration 78. QT/QTc 380/398. P-R-T axis 76 73 21. Sinus rhythm with marked sinus arrhythmia. Otherwise normal ECG. Read time: 1318      Laboratory:  Laboratory findings  were communicated with the patient who voiced understanding of the findings.    CBC: WBC: 5.0, HGB: 14.1, PLT: 151    CMP: WNL (Creatinine: 0.64)    D-dimer: 0.8 (high)    1436 Troponin: <0.015     Influenza A/B Antigen: A Antigen: Negative; B Antigen: Negative    Interventions:  1436 Toradol 15mg IV  1436 NS 1L IV   1438 Solu-medrol 125mg IV    Emergency Department Course:  Past medical records, nursing notes, and vitals reviewed.    1400 I performed an exam of the patient as documented above.     EKG obtained in the ED, see results above.   IV was inserted and blood was drawn for laboratory testing, results above.  A nasal swab was obtained to test for influenza while in the emergency department today, see results above.    1607 I rechecked the patient and discussed the results of her workup thus far. I discussed receiving CT imaging today due to the elevated D-dimer, however the patient declines this citing financial reasons. I discussed with her the risks and that we cannot fully rule out pulmonary emboli, and the patient understands this and states she will return if she changes her mind.     Findings and plan explained to the patient. Patient discharged home with instructions regarding supportive care, medications, and reasons to return. The importance of close follow-up was reviewed. The patient was prescribed DuoNeb nebules and prednisone.    I personally reviewed the laboratory results with the patient and answered all related questions prior to discharge.     Impression & Plan     Medical Decision Making:  zAul Rojas is a 24 year old female who presents for evaluation of shortness of breath and chest pain and possible asthma exacerbation. She was clear on her initial exam and did not need a nebulizer treatment - apparently she had already completed one at home. Her evaluation revealed a mildly elevated D-dimer, however she declined chest x-ray as well as CT scan. I indicated to her that because of  the elevated D-dimer that she would nee the CT scan to rule out pulmonary embolism. I discussed what an elevated D-dimer means, and the problems that a blood clot to the lungs that is undiagnosed can cause. She still declines any imaging, citing financial reasons. She cites that she also feels better and does not want any more work up in the ER. I discussed with her with her boyfriend here, she is not intoxicated, is not disoriented and can make her own decisions. She states that she will return if she changes her mind, but wants to try a course of steroids to see if this will improve her asthma symptoms, and again I cautioned her and discussed with her that her symptoms certainly could be due to asthma exacerbation but that we did not rule out pulmonary emboli as the cause of her shortness of breath. She voiced understanding and indicated to me that she understands the risks that she is taking and will return if she feels that she wants to get that done. She is discharged with prednisone for five days as well as a refill on her DuoNeb nebules. She is referred to primary clinic for Monday if her symptoms worsen, if she changes her mind she will try to come back to the ER ASAP.     Diagnosis:    ICD-10-CM    1. Exacerbation of asthma, unspecified asthma severity, unspecified whether persistent  J45.901    2. Elevated d-dimer  R79.89        Disposition:  Discharged to home.    Discharge Medications:  New Prescriptions    IPRATROPIUM - ALBUTEROL 0.5 MG/2.5 MG/3 ML (DUONEB) 0.5-2.5 (3) MG/3ML NEB SOLUTION    Take 1 vial (3 mLs) by nebulization every 4 hours as needed for shortness of breath / dyspnea or wheezing    PREDNISONE (DELTASONE) 20 MG TABLET    Take two tablets (= 40mg) each day for 5 (five) days       Scribe Disclosure:  I, Todd Li, am serving as a scribe at 2:06 PM on 3/13/2020 to document services personally performed by Davey Milian MD based on my observations and the provider's statements to me.       Davey Milian MD  03/13/20 3911

## 2020-03-13 NOTE — ED AVS SNAPSHOT
[facility] Emergency Department  [address]  [facility] [address]  Phone:  [phone]  Fax:  [phone]                                    Azul Rojas   MRN: [id number]    Department:  [facility] Emergency Department   Date of Visit:  3/13/2020           After Visit Summary Signature Page    I have received my discharge instructions, and my questions have been answered. I have discussed any challenges I see with this plan with the nurse or doctor.    ..........................................................................................................................................  Patient/Patient Representative Signature      ..........................................................................................................................................  Patient Representative Print Name and Relationship to Patient    ..................................................               ................................................  Date                                   Time    ..........................................................................................................................................  Reviewed by Signature/Title    ...................................................              ..............................................  Date                                               Time          22EPIC Rev 08/18

## 2020-03-14 LAB — INTERPRETATION ECG - MUSE: NORMAL

## 2020-03-18 ENCOUNTER — VIRTUAL VISIT (OUTPATIENT)
Dept: FAMILY MEDICINE | Facility: CLINIC | Age: 25
End: 2020-03-18
Payer: COMMERCIAL

## 2020-03-18 DIAGNOSIS — R06.02 SOB (SHORTNESS OF BREATH): Primary | ICD-10-CM

## 2020-03-18 PROCEDURE — 99442 ZZC PHYSICIAN TELEPHONE EVALUATION 11-20 MIN: CPT | Performed by: FAMILY MEDICINE

## 2020-03-18 NOTE — PROGRESS NOTES
"Azul Rojas is a 24 year old female who is being evaluated via a billable telephone visit.      The patient has been notified of following:     \"This telephone visit will be conducted via a call between you and your physician/provider. We have found that certain health care needs can be provided without the need for a physical exam.  This service lets us provide the care you need with a short phone conversation.  If a prescription is necessary we can send it directly to your pharmacy.  If lab work is needed we can place an order for that and you can then stop by our lab to have the test done at a later time.    If during the course of the call the physician/provider feels a telephone visit is not appropriate, you will not be charged for this service.\"       Azul Rojas complains of    Chief Complaint   Patient presents with     Asthma       I have reviewed and updated the patient's Past Medical History, Social History, Family History and Medication List.    ALLERGIES  Patient has no known allergies.    Brennon Love CMA       (MA signature)    Additional provider notes:     Patient having continued shortness of breath symptoms despite treatment for asthma from ER.  Patient was seen in the emergency department on 3/13 and had elevated d-dimer.  ER personnel recommended CT scan rule out PE.  Patient elected to try treatment for her asthma initially at that time.  Has not had improvement in symptoms.  Denies chest pain particularly.  No swelling in legs or redness.  Patient is on oral contraceptives.  Non-smoker.  Denies fever, sweats, or chills.  Has had some thicker sputum since that time as well.    Asthma concerns. Hard to take a full breath. Chest pain left side. Symptoms this episode over a month.     Assessment/Plan:  1. SOB (shortness of breath)  Recommend CT scan to rule out PE with ongoing shortness of breath and prior elevated d-dimer consistent with recommendations from ER provider.  Patient agrees " to plan.  She knows to go to the emergency department if she has chest pain or worsening shortness of breath in the meantime.  - CT Chest Pulmonary Embolism w Contrast; Future      I have reviewed the note as documented above.  This accurately captures the substance of my conversation with the patient.    Phone call contact time  14 minutes        Shaka Nichols MD

## 2020-03-19 ENCOUNTER — TELEPHONE (OUTPATIENT)
Dept: FAMILY MEDICINE | Facility: CLINIC | Age: 25
End: 2020-03-19

## 2020-03-19 ENCOUNTER — HOSPITAL ENCOUNTER (OUTPATIENT)
Dept: CT IMAGING | Facility: CLINIC | Age: 25
Discharge: HOME | End: 2020-03-19
Attending: FAMILY MEDICINE | Admitting: FAMILY MEDICINE
Payer: COMMERCIAL

## 2020-03-19 DIAGNOSIS — R06.02 SHORTNESS OF BREATH: ICD-10-CM

## 2020-03-19 DIAGNOSIS — R06.02 SOB (SHORTNESS OF BREATH): ICD-10-CM

## 2020-03-19 DIAGNOSIS — J45.41 MODERATE PERSISTENT ASTHMATIC BRONCHITIS WITH ACUTE EXACERBATION: ICD-10-CM

## 2020-03-19 PROCEDURE — 25000128 H RX IP 250 OP 636: Performed by: FAMILY MEDICINE

## 2020-03-19 PROCEDURE — 71275 CT ANGIOGRAPHY CHEST: CPT

## 2020-03-19 PROCEDURE — 25000125 ZZHC RX 250: Performed by: FAMILY MEDICINE

## 2020-03-19 RX ORDER — IPRATROPIUM BROMIDE AND ALBUTEROL SULFATE 2.5; .5 MG/3ML; MG/3ML
1 SOLUTION RESPIRATORY (INHALATION) EVERY 4 HOURS PRN
Qty: 1 BOX | Refills: 1 | Status: SHIPPED | OUTPATIENT
Start: 2020-03-19 | End: 2020-04-10

## 2020-03-19 RX ORDER — ALBUTEROL SULFATE 90 UG/1
2 AEROSOL, METERED RESPIRATORY (INHALATION) EVERY 6 HOURS
Qty: 1 INHALER | Refills: 0 | Status: SHIPPED | OUTPATIENT
Start: 2020-03-19 | End: 2020-06-12

## 2020-03-19 RX ORDER — PREDNISONE 20 MG/1
40 TABLET ORAL DAILY
Qty: 10 TABLET | Refills: 0 | Status: SHIPPED | OUTPATIENT
Start: 2020-03-19 | End: 2020-04-10

## 2020-03-19 RX ORDER — IOPAMIDOL 755 MG/ML
100 INJECTION, SOLUTION INTRAVASCULAR ONCE
Status: COMPLETED | OUTPATIENT
Start: 2020-03-19 | End: 2020-03-19

## 2020-03-19 RX ORDER — FLUTICASONE PROPIONATE 110 UG/1
1 AEROSOL, METERED RESPIRATORY (INHALATION) 2 TIMES DAILY
Qty: 12 G | Refills: 3 | Status: SHIPPED | OUTPATIENT
Start: 2020-03-19 | End: 2020-04-20

## 2020-03-19 RX ADMIN — SODIUM CHLORIDE 84 ML: 9 INJECTION, SOLUTION INTRAVENOUS at 09:25

## 2020-03-19 RX ADMIN — IOPAMIDOL 70 ML: 755 INJECTION, SOLUTION INTRAVENOUS at 09:25

## 2020-03-19 NOTE — TELEPHONE ENCOUNTER
Calling on CT scan which was read as normal.  No pneumonia or acute process.  No pulmonary embolism noted.    Recommend treating for asthma exacerbation with prednisone, duo nebs, restart Flovent, refill albuterol inhaler for as needed.  If having worsening shortness of breath or cough and fever with worsening chest symptoms follow-up in ER.  Patient agrees with plan.

## 2020-04-10 ENCOUNTER — VIRTUAL VISIT (OUTPATIENT)
Dept: FAMILY MEDICINE | Facility: CLINIC | Age: 25
End: 2020-04-10
Payer: COMMERCIAL

## 2020-04-10 DIAGNOSIS — J45.40 MODERATE PERSISTENT ASTHMA, UNSPECIFIED WHETHER COMPLICATED: ICD-10-CM

## 2020-04-10 PROCEDURE — 99214 OFFICE O/P EST MOD 30 MIN: CPT | Mod: 95 | Performed by: FAMILY MEDICINE

## 2020-04-10 RX ORDER — IPRATROPIUM BROMIDE AND ALBUTEROL SULFATE 2.5; .5 MG/3ML; MG/3ML
1 SOLUTION RESPIRATORY (INHALATION) EVERY 4 HOURS PRN
Qty: 1 BOX | Refills: 1 | Status: SHIPPED | OUTPATIENT
Start: 2020-04-10 | End: 2020-04-20

## 2020-04-10 RX ORDER — FLUTICASONE PROPIONATE 220 UG/1
1 AEROSOL, METERED RESPIRATORY (INHALATION) 2 TIMES DAILY
Qty: 12 G | Refills: 0 | Status: SHIPPED | OUTPATIENT
Start: 2020-04-10 | End: 2020-04-22

## 2020-04-10 ASSESSMENT — ENCOUNTER SYMPTOMS
COUGH: 1
FEVER: 0
WHEEZING: 1

## 2020-04-10 NOTE — PROGRESS NOTES
"Subjective     Azul Rojas is a 24 year old female who is being evaluated via a billable telephone visit.      The patient has been notified of following:     \"This telephone visit will be conducted via a call between you and your physician/provider. We have found that certain health care needs can be provided without the need for a physical exam.  This service lets us provide the care you need with a short phone conversation.  If a prescription is necessary we can send it directly to your pharmacy.  If lab work is needed we can place an order for that and you can then stop by our lab to have the test done at a later time.    Telephone visits are billed at different rates depending on your insurance coverage. During this emergency period, for some insurers they may be billed the same as an in-person visit.  Please reach out to your insurance provider with any questions.    If during the course of the call the physician/provider feels a telephone visit is not appropriate, you will not be charged for this service.\"    Patient has given verbal consent for Telephone visit?  Yes      Azul Rojas complains of   Chief Complaint   Patient presents with     Telephone       ALLERGIES  Patient has no known allergies.    Asthma Follow-Up    Was ACT completed today?  No      Do you have a cough?  YES, dry cough     Are you experiencing any wheezing in your chest?  YES    Do you have any shortness of breath?  YES     How often are you using a short-acting (rescue) inhaler or nebulizer, such as Albuterol?  Every 4 hours  And inhaler 2-3 times daily     How many days per week do you miss taking your asthma controller medication?  0    Please describe any recent triggers for your asthma: dust mites, occupational exposure and air quality     Have you had any Emergency Room Visits, Urgent Care Visits, or Hospital Admissions since your last office visit?  Yes  Number of ER or Urgent Care visits for asthma: 1 month ago "       How many servings of fruits and vegetables do you eat daily?  0-1    On average, how many sweetened beverages do you drink each day (Examples: soda, juice, sweet tea, etc.  Do NOT count diet or artificially sweetened beverages)?   3    How many days per week do you exercise enough to make your heart beat faster? 4    How many minutes a day do you exercise enough to make your heart beat faster? 60 or more    How many days per week do you miss taking your medication? 0        Patient with history of asthma.  Recently had visit with provider for ER follow-up less than a month ago where she ended up getting a negative CT PE study.  Got restarted on Flovent and has been using albuterol every 4-6 hours as needed.  Continues to have shortness of breath.  Does find inhalers helpful.  States she has been like this for a year and symptoms have been progressively worsening.  Has difficulty taking a full breath, wheezing, nighttime awakening symptoms, cough.  Patient does not recall performing an office spirometry or formal pulmonary function testing.      Reviewed and updated as needed this visit by Provider         Review of Systems   Constitutional: Negative for fever.   Respiratory: Positive for cough and wheezing.              Objective   No conversational dyspnea, no cough while on phone call.  No audible wheezing.        Assessment/Plan:  1. Moderate persistent asthma, unspecified whether complicated   Uncontrolled.  Increase Flovent from medium dose to high-dose.  Continue albuterol every 4 hours as needed.  Refilled duo nebs to be used only in rescue situations.  Recommend follow-up in person in 1 week, will need formal in office spirometry testing at some point after illness.  Consider next step up would be to add a long-acting beta agonist.   - fluticasone (FLOVENT HFA) 220 MCG/ACT inhaler; Inhale 1 puff into the lungs 2 times daily  Dispense: 12 g; Refill: 0  - ipratropium - albuterol 0.5 mg/2.5 mg/3 mL  (DUONEB) 0.5-2.5 (3) MG/3ML neb solution; Take 1 vial (3 mLs) by nebulization every 4 hours as needed for shortness of breath / dyspnea or wheezing  Dispense: 1 Box; Refill: 1    Return in about 1 week (around 4/17/2020) for Asthma recheck .      Phone call duration:  7minutes    Vincent Mendoza MD

## 2020-04-20 ENCOUNTER — OFFICE VISIT (OUTPATIENT)
Dept: FAMILY MEDICINE | Facility: CLINIC | Age: 25
End: 2020-04-20
Payer: COMMERCIAL

## 2020-04-20 VITALS
HEART RATE: 101 BPM | DIASTOLIC BLOOD PRESSURE: 64 MMHG | HEIGHT: 63 IN | RESPIRATION RATE: 16 BRPM | BODY MASS INDEX: 31.36 KG/M2 | OXYGEN SATURATION: 98 % | WEIGHT: 177 LBS | TEMPERATURE: 98.6 F | SYSTOLIC BLOOD PRESSURE: 110 MMHG

## 2020-04-20 DIAGNOSIS — Z23 NEED FOR TDAP VACCINATION: ICD-10-CM

## 2020-04-20 DIAGNOSIS — R06.02 SHORTNESS OF BREATH: ICD-10-CM

## 2020-04-20 DIAGNOSIS — F41.9 ANXIETY AND DEPRESSION: Primary | ICD-10-CM

## 2020-04-20 DIAGNOSIS — J45.40 MODERATE PERSISTENT ASTHMA, UNSPECIFIED WHETHER COMPLICATED: ICD-10-CM

## 2020-04-20 DIAGNOSIS — F32.A ANXIETY AND DEPRESSION: Primary | ICD-10-CM

## 2020-04-20 PROCEDURE — 90471 IMMUNIZATION ADMIN: CPT | Performed by: FAMILY MEDICINE

## 2020-04-20 PROCEDURE — 90715 TDAP VACCINE 7 YRS/> IM: CPT | Performed by: FAMILY MEDICINE

## 2020-04-20 PROCEDURE — 99214 OFFICE O/P EST MOD 30 MIN: CPT | Mod: 25 | Performed by: FAMILY MEDICINE

## 2020-04-20 PROCEDURE — 96127 BRIEF EMOTIONAL/BEHAV ASSMT: CPT | Performed by: FAMILY MEDICINE

## 2020-04-20 RX ORDER — IPRATROPIUM BROMIDE AND ALBUTEROL SULFATE 2.5; .5 MG/3ML; MG/3ML
1 SOLUTION RESPIRATORY (INHALATION) EVERY 4 HOURS PRN
Qty: 1 BOX | Refills: 1 | Status: SHIPPED | OUTPATIENT
Start: 2020-04-20 | End: 2020-06-12

## 2020-04-20 RX ORDER — FLUTICASONE PROPIONATE 110 UG/1
1 AEROSOL, METERED RESPIRATORY (INHALATION) 2 TIMES DAILY
Qty: 12 G | Refills: 3 | Status: SHIPPED | OUTPATIENT
Start: 2020-04-20 | End: 2020-04-22 | Stop reason: DRUGHIGH

## 2020-04-20 RX ORDER — ESCITALOPRAM OXALATE 5 MG/1
5 TABLET ORAL DAILY
Qty: 30 TABLET | Refills: 1 | Status: SHIPPED | OUTPATIENT
Start: 2020-04-20 | End: 2020-06-19

## 2020-04-20 ASSESSMENT — ANXIETY QUESTIONNAIRES
5. BEING SO RESTLESS THAT IT IS HARD TO SIT STILL: SEVERAL DAYS
7. FEELING AFRAID AS IF SOMETHING AWFUL MIGHT HAPPEN: NEARLY EVERY DAY
3. WORRYING TOO MUCH ABOUT DIFFERENT THINGS: NEARLY EVERY DAY
1. FEELING NERVOUS, ANXIOUS, OR ON EDGE: MORE THAN HALF THE DAYS
IF YOU CHECKED OFF ANY PROBLEMS ON THIS QUESTIONNAIRE, HOW DIFFICULT HAVE THESE PROBLEMS MADE IT FOR YOU TO DO YOUR WORK, TAKE CARE OF THINGS AT HOME, OR GET ALONG WITH OTHER PEOPLE: SOMEWHAT DIFFICULT
6. BECOMING EASILY ANNOYED OR IRRITABLE: MORE THAN HALF THE DAYS
GAD7 TOTAL SCORE: 16
2. NOT BEING ABLE TO STOP OR CONTROL WORRYING: NEARLY EVERY DAY

## 2020-04-20 ASSESSMENT — MIFFLIN-ST. JEOR: SCORE: 1532

## 2020-04-20 ASSESSMENT — ENCOUNTER SYMPTOMS
SLEEP DISTURBANCE: 0
NERVOUS/ANXIOUS: 1
SHORTNESS OF BREATH: 1

## 2020-04-20 ASSESSMENT — PATIENT HEALTH QUESTIONNAIRE - PHQ9
5. POOR APPETITE OR OVEREATING: MORE THAN HALF THE DAYS
SUM OF ALL RESPONSES TO PHQ QUESTIONS 1-9: 22

## 2020-04-20 NOTE — PATIENT INSTRUCTIONS
Patient Education     Your Body s Response to Anxiety    Normal anxiety is part of the body s natural defense system. It's an alert to a threat that is unknown, vague, or comes from your own internal fears. While you re in this state, your feelings can range from a vague sense of worry to physical sensations such as a pounding heartbeat. These feelings make you want to react to the threat. An anxiety response is normal in many situations. But when you have an anxiety disorder, the same response can occur at the wrong times.  Anxiety can be helpful  Normal anxiety is a signal from your brain that warns you of a threat and is a normal response to help you prevent something or decrease the bad effects of something you can't control. For example, anxiety is a normal response to situations that might damage your body, separate you from a loved one, or lose your job. The symptoms of anxiety can be physical and mental.  How does it feel?  At certain times, people with anxiety may have:    Dizziness    Muscle tension or pain    Restlessness    Sleeplessness    Trouble concentrating    Racing heartbeat    Fast breathing    Shaking or trembling    Stomachache    Diarrhea    Loss of energy    Sweating    Cold, clammy hands    Chest pain    Dry mouth  Anxiety can also be a problem  Anxiety can become a problem when it is hard to control, occurs for months, and interferes with important parts of your life. With an anxiety disorder, your body has the response described above, but in inappropriate ways. The response a person has depends on the anxiety disorder he or she has. With some disorders, the anxiety is way out of proportion to the threat that triggers it. With others, anxiety may occur even when there isn t a clear threat or trigger.  Who does it affect?  Some people are more prone to persistent anxiety than others. It tends to run in families, and it affects more younger people than older people, and more women than men.  "But no age, race, or gender is immune to anxiety problems.  Anxiety can be treated  The good news is that the anxiety that s disrupting your life can be treated. Check with your healthcare provider and rule out any physical problems that may be causing the anxiety symptoms. If an anxiety disorder is diagnosed seek mental healthcare. This is an illness and it can respond to treatment. Most types of anxiety disorders will respond to \"talk therapy\" and medicines. Working with your doctor or other healthcare provider, you can develop skills to help you cope with anxiety. You can also gain the perspective you need to overcome your fears. Note: Good sources of support or guidance can be found at your local hospital, mental health clinic, or an employee assistance program.  How to cope with anxiety  If anxiety is wearing you down, here are some things you can do to cope:    Keep in mind that you can t control everything about a situation. Change what you can and let the rest take its course.    Exercise--it s a great way to relieve tension and help your body feel relaxed.    Avoid caffeine and nicotine, which can make anxiety symptoms worse.    Fight the temptation to turn to alcohol or unprescribed drugs for relief. They only make things worse in the long run.    Educate yourself about anxiety disorders. Keep track of helpful online resources and books you can use during stressful periods.    Try stress management techniques such as meditation.    Consider online or in-person support groups.   Date Last Reviewed: 1/1/2017 2000-2019 [employer]. [address] [place] [address], [place] [address]. All rights reserved. This information is not intended as a substitute for professional medical care. Always follow your healthcare professional's instructions.           "

## 2020-04-20 NOTE — PROGRESS NOTES
Subjective     Azul Rojas is a 24 year old female who presents to clinic today for the following health issues:    HPI   Asthma Follow-Up    Was ACT completed today?  Yes    ACT Total Scores 4/20/2020   ACT TOTAL SCORE (Goal Greater than or Equal to 20) 9   In the past 12 months, how many times did you visit the emergency room for your asthma without being admitted to the hospital? 1   In the past 12 months, how many times were you hospitalized overnight because of your asthma? 0       How many days per week do you miss taking your asthma controller medication?  0    Please describe any recent triggers for your asthma: smoke, animal dander, insects/rodents, mold, humidity, strong odors and fumes, exercise or sports, emotions and cold air    Have you had any Emergency Room Visits, Urgent Care Visits, or Hospital Admissions since your last office visit?  No      How many servings of fruits and vegetables do you eat daily?  2-3    On average, how many sweetened beverages do you drink each day (Examples: soda, juice, sweet tea, etc.  Do NOT count diet or artificially sweetened beverages)?   0    How many days per week do you exercise enough to make your heart beat faster? 4    How many minutes a day do you exercise enough to make your heart beat faster? 30 - 60    How many days per week do you miss taking your medication? 0    Patient is a pleasant 24-year-old female with reported history of asthma who presents to clinic for evaluation of shortness of breath.  She had a virtual visit with me last week, please see encounter note dated 4-.    Currently on rescue albuterol as needed, finds minimal benefit. Continues Flovent daily. She is also on duo nebs which she has been using 1-2 times a day which does help her.      States that she has a suffocating feel when her exerting herself and she wheezes and has chest tightness.  Has intermittent nighttime awakening symptoms of wheezing and shortness of breath.   "States that her asthma diagnosis is recent in the last 2 years.  No childhood history of asthma.    The last 2 years, has had increased life stressors.  Recently lost her job, transferred from [employer] to [employer] just dropped out.  Was trying to finish a 2-year program, has now been going on for 4.5 years.    PHQ 4/20/2020   PHQ-9 Total Score 22   Q9: Thoughts of better off dead/self-harm past 2 weeks More than half the days     KARLY-7 SCORE 1/6/2014 2/11/2014 4/20/2020   Total Score 17 13 -   Total Score - - 16       Wishes to establish care and wants me to be her PCP.    Reviewed and updated as needed this visit by Provider         Review of Systems   Respiratory: Positive for shortness of breath.    Psychiatric/Behavioral: Negative for sleep disturbance and suicidal ideas. The patient is nervous/anxious.       Medications updated and reviewed.  Past, family and surgical history is updated and reviewed in the record.  Past Medical History:   Diagnosis Date     Anxiety and depression      Shortness of breath             Objective    /64 (BP Location: Right arm, Patient Position: Chair, Cuff Size: Adult Regular)   Pulse 101   Temp 98.6  F (37  C) (Oral)   Resp 16   Ht 1.6 m (5' 3\")   Wt 80.3 kg (177 lb)   SpO2 98%   BMI 31.35 kg/m    Body mass index is 31.35 kg/m .  Physical Exam  Vitals signs reviewed.   Constitutional:       Appearance: She is not ill-appearing.   Cardiovascular:      Rate and Rhythm: Normal rate and regular rhythm.      Comments: HR: 80 bpm.  Pulmonary:      Effort: Pulmonary effort is normal. No respiratory distress.      Breath sounds: Normal breath sounds. No wheezing.   Skin:     Comments: Well-healed linear scar marks on the left wrist.   Psychiatric:      Comments: Emotionally labile, tearful.              Diagnostic Test Results:  Labs reviewed in Epic        Assessment & Plan     1. Shortness of breath  Provider wore yellow isolation mask " throughout entire encounter.   Uncontrolled.  Reported history of asthma, recently seen in the ER on 3- diagnosed with asthma exacerbation, restart inhalers.  Evaluated via virtual visit in outpatient, had a negative CT PE scan.  Evaluated by me on 4-, Flovent increased from medium to high dose with anticipation of adding LABA if symptoms are uncontrolled.  Today, saturating well on room air, no audible wheezing.  Unable to perform spirometry in lieu of coronavirus pandemic.  Continue current medical management, however I do suspect that mental health may be a contributing component.  Will follow closely.    - ipratropium - albuterol 0.5 mg/2.5 mg/3 mL (DUONEB) 0.5-2.5 (3) MG/3ML neb solution; Take 1 vial (3 mLs) by nebulization every 4 hours as needed for shortness of breath / dyspnea or wheezing  Dispense: 1 Box; Refill: 1    2. Anxiety and depression  New diagnoses.  Start Lexapro 5 mg daily, outpatient counseling.  Safety plan reviewed; seen by provider or go to ER if suicidal.  Follow-up in 1 month for recheck of anxiety and depression.  - escitalopram (LEXAPRO) 5 MG tablet; Take 1 tablet (5 mg) by mouth daily  Dispense: 30 tablet; Refill: 1  - MENTAL HEALTH REFERRAL  - Adult; Outpatient Treatment; Individual/Couples/Family/Group Therapy/Health Psychology; [facility] [place]: [facility] [phone]; We will contact you to schedule the appointment or please call with any questions    3. Need for Tdap vaccination  - TDAP, IM (10 - 64 YRS) - Adacel       Return in about 1 month (around 5/20/2020) for anxiety and depression.    Vincent Mendoza MD  [facility]    Documentation was prepared using Dragon voice recognition software. Please excuse typographical errors. Please contact me if documentation is unclear.      Addendum: 4-  Assessment & Plan     Correct dose of Flovent should have been 220 mg per actuation.  New Rx sent.    4. Moderate persistent asthma, unspecified  whether complicated  - fluticasone (FLOVENT HFA) 220 MCG/ACT inhaler; Inhale 1 puff into the lungs 2 times daily  Dispense: 12 g; Refill: 3       Return in about 1 month (around 5/20/2020) for anxiety and depression.    Vincent Mendoza MD  [facility]

## 2020-04-21 ASSESSMENT — ASTHMA QUESTIONNAIRES: ACT_TOTALSCORE: 9

## 2020-04-21 ASSESSMENT — ANXIETY QUESTIONNAIRES: GAD7 TOTAL SCORE: 16

## 2020-04-22 ENCOUNTER — TELEPHONE (OUTPATIENT)
Dept: FAMILY MEDICINE | Facility: CLINIC | Age: 25
End: 2020-04-22

## 2020-04-22 RX ORDER — FLUTICASONE PROPIONATE 220 UG/1
1 AEROSOL, METERED RESPIRATORY (INHALATION) 2 TIMES DAILY
Qty: 12 G | Refills: 3 | Status: SHIPPED | OUTPATIENT
Start: 2020-04-22 | End: 2020-06-12

## 2020-04-22 NOTE — TELEPHONE ENCOUNTER
[facility] calls, received 2 different Flovent prescriptions from [facility], pt picked up flovent 220 from 4/10/20 prescription than received new rx for flovent 110 on 4/20/20, please confirm dose decrease or which one they should use, INFORM Sammie when final    Melina Miller RN, BSN  Message handled by Nurse Triage.

## 2020-04-22 NOTE — PROGRESS NOTES
Can we please call [facility] pharmacy and let them know the correct Flovent has been sent.  Should have been 220 mcg per actuation not the 110 mcg which was accidentally prescribed.    [id number] - [place], [place] - [address]  [address] [phone]      Best regards,     Vincent Mendoza MD

## 2020-05-02 ENCOUNTER — NURSE TRIAGE (OUTPATIENT)
Dept: NURSING | Facility: CLINIC | Age: 25
End: 2020-05-02

## 2020-05-02 NOTE — TELEPHONE ENCOUNTER
"Tetanus injection site: dark colored (dime sized red area, and also brown, like an old bruise, approx between quarter and 50 cent piece size), itches, and arm is still a little weak, it hurts mostly in the shoulder. It is not as painful as it was in the beginning. Pain currently = \"2\".   Vaccination given 4/20/2020.  Hx of asthma: she uses an inhaler. No change noted.   Triaged to a disposition of See PCP within 3 days. Discussed options: [email or web address], or telephone visit with provider. She would like to schedule a telephone visit with a provider: call transferred to .    Reason for Disposition    [1] Pain, tenderness, or swelling at the injection site AND [2] persists > 3 days    Additional Information    Negative: [1] Difficulty with breathing or swallowing AND [2] starts within 2 hours after injection    Negative: Difficult to awaken or acting confused (e.g., disoriented, slurred speech)    Negative: Unresponsive, passed out, or very weak    Negative: Sounds like a life-threatening emergency to the triager    Negative: Fever > 104 F (40 C)    Negative: [1] Fever > 101 F (38.3 C) AND [2] age > 60    Negative: [1] Fever > 100.0 F (37.8 C) AND [2] bedridden (e.g., nursing home patient, CVA, chronic illness, recovering from surgery)    Negative: [1] Fever > 100.0 F (37.8 C) AND [2] diabetes mellitus or weak immune system (e.g., HIV positive, cancer chemo, splenectomy, chronic steroids)    Negative: [1] Measles vaccine rash (onset day 6-12) AND [2] purple or blood-colored    Negative: Sounds like a severe, unusual reaction to the triager    Negative: [1] Redness or red streak around the injection site AND [2] begins > 48 hours after shot AND [3] fever    Negative: [1] Redness or red streak around the injection site AND [2] begins > 48 hours after shot AND [3] no fever  (Exception: red area < 1 inch or 2.5 cm wide)    Negative: Fever present > 3 days (72 hours)    Negative: [1] Over 3 days (72 hours) since shot " AND [2] redness, swelling or pain getting worse    Negative: [1] Smallpox vaccine and [2] eye pain, eye redness, or rash on eyelids    Protocols used: IMMUNIZATION [id number]    COVID 19 Nurse Triage Plan/Patient Instructions    Please be aware that novel coronavirus (COVID-19) may be circulating in the community. If you develop symptoms such as fever, cough, or SOB or if you have concerns about the presence of another infection including coronavirus (COVID-19), please contact your health care provider or visit [email or web address].     Disposition/Instructions    Patient to have scheduled Telephone Visit with a provider. Follow System Ambulatory Workflow for COVID 19.     The clinic staff will assist you to schedule an appointment to complete the Telephone Visit with a provider during normal clinic hours.       Call Back If: Your symptoms worsen before you are able to complete your Telephone Visit with a provider.    Thank you for limiting contact with others, wearing a simple mask to cover your cough, practice good hand hygiene habits and accessing our virtual services where possible to limit the spread of this virus.    For more information about COVID19 and options for caring for yourself at home, please visit the CDC website at https://www.cdc.gov/coronavirus/2019-ncov/about/steps-when-sick.html  For more options for care at [facility], please visit our website at [email or web address]    For more information, please use the [place] Department of Health COVID-19 Website: https://www.health.[facility].[place]/diseases/coronavirus/index.html  [place] Department of Health ([facility]) COVID-19 Hotlines (Interpreters available):      Health questions: Phone Number: [phone] or [phone] and Hours: 7 a.m. to 7 p.m.    Schools and  questions: Phone Number: [phone] or [phone] and Hours 7 a.m. to 7 p.m.

## 2020-06-12 ENCOUNTER — NURSE TRIAGE (OUTPATIENT)
Dept: NURSING | Facility: CLINIC | Age: 25
End: 2020-06-12

## 2020-06-12 ENCOUNTER — VIRTUAL VISIT (OUTPATIENT)
Dept: FAMILY MEDICINE | Facility: CLINIC | Age: 25
End: 2020-06-12
Payer: COMMERCIAL

## 2020-06-12 DIAGNOSIS — J45.901 EXACERBATION OF ASTHMA, UNSPECIFIED ASTHMA SEVERITY, UNSPECIFIED WHETHER PERSISTENT: Primary | ICD-10-CM

## 2020-06-12 DIAGNOSIS — R06.02 SHORTNESS OF BREATH: ICD-10-CM

## 2020-06-12 DIAGNOSIS — J45.40 MODERATE PERSISTENT ASTHMA, UNSPECIFIED WHETHER COMPLICATED: ICD-10-CM

## 2020-06-12 DIAGNOSIS — R05.9 COUGH: ICD-10-CM

## 2020-06-12 DIAGNOSIS — R07.9 CHEST PAIN, UNSPECIFIED TYPE: ICD-10-CM

## 2020-06-12 PROCEDURE — 99214 OFFICE O/P EST MOD 30 MIN: CPT | Mod: 95 | Performed by: FAMILY MEDICINE

## 2020-06-12 RX ORDER — FLUTICASONE PROPIONATE 220 UG/1
1 AEROSOL, METERED RESPIRATORY (INHALATION) 2 TIMES DAILY
Qty: 12 G | Refills: 3 | Status: SHIPPED | OUTPATIENT
Start: 2020-06-12 | End: 2020-08-17 | Stop reason: ALTCHOICE

## 2020-06-12 RX ORDER — BENZONATATE 100 MG/1
100 CAPSULE ORAL 3 TIMES DAILY PRN
Qty: 30 CAPSULE | Refills: 0 | Status: SHIPPED | OUTPATIENT
Start: 2020-06-12 | End: 2020-07-07

## 2020-06-12 RX ORDER — PREDNISONE 50 MG/1
50 TABLET ORAL DAILY
Qty: 5 TABLET | Refills: 0 | Status: SHIPPED | OUTPATIENT
Start: 2020-06-12 | End: 2020-07-07

## 2020-06-12 RX ORDER — ALBUTEROL SULFATE 90 UG/1
2 AEROSOL, METERED RESPIRATORY (INHALATION) EVERY 4 HOURS PRN
Qty: 1 INHALER | Refills: 11 | Status: SHIPPED | OUTPATIENT
Start: 2020-06-12 | End: 2020-11-17

## 2020-06-12 RX ORDER — IPRATROPIUM BROMIDE AND ALBUTEROL SULFATE 2.5; .5 MG/3ML; MG/3ML
1 SOLUTION RESPIRATORY (INHALATION) EVERY 4 HOURS PRN
Qty: 1 BOX | Refills: 1 | Status: SHIPPED | OUTPATIENT
Start: 2020-06-12 | End: 2020-08-17

## 2020-06-12 ASSESSMENT — ENCOUNTER SYMPTOMS
EYE DISCHARGE: 0
FEVER: 0
WOUND: 0
SHORTNESS OF BREATH: 1
CHILLS: 0
RHINORRHEA: 0
EYE PAIN: 0
EYE ITCHING: 0
SORE THROAT: 1
MYALGIAS: 0
DIZZINESS: 1
CONSTIPATION: 0
DIARRHEA: 0
HEADACHES: 1
PALPITATIONS: 1
COUGH: 1
WHEEZING: 1
JOINT SWELLING: 0

## 2020-06-12 NOTE — PATIENT INSTRUCTIONS
Patient Education     Uncertain Causes of Chest Pain    Chest pain can happen for a number of reasons. Sometimes the cause can't be determined. If your condition does not seem serious, and your pain does not appear to be coming from your heart, your healthcare provider may recommend watching it closely. Sometimes the signs of a serious problem take more time to appear. Many problems not related to your heart can cause chest pain. These include:    Musculoskeletal. Costochondritis is an inflammation of the tissues around the ribs that can occur from trauma or overuse injuries, or a strain of the muscles of the chest wall    Respiratory. Pneumonia, collapsed lung (pneumothorax), or inflammation of the lining of the chest and lungs (pleurisy)    Gastrointestinal. Esophageal reflux, heartburn, ulcers, or gallbladder disease    Anxiety and panic disorders    Nerve compression and inflammation    Rare miscellaneous problems such as aortic aneurysm (a swelling of the large artery coming out of the heart) or pulmonary embolism (a blood clot in the lungs)  Home care  After your visit, follow these recommendations:    Rest today and avoid strenuous activity.    Take any prescribed medicine as directed.    Be aware of any recurrent chest pain and notice any changes  Follow-up care  Follow up with your healthcare provider if you do not start to feel better within 24 hours, or as advised.  Call 911  Call 911 if any of these occur:    A change in the type of pain: if it feels different, becomes more severe, lasts longer, or begins to spread into your shoulder, arm, neck, jaw or back    Shortness of breath or increased pain with breathing    Weakness, dizziness, or fainting    Rapid heart beat    Crushing sensation in your chest  When to seek medical advice  Call your healthcare provider right away if any of the following occur:    Cough with dark colored sputum (phlegm) or blood    Fever of 100.4 F (38 C) or higher, or as  directed by your healthcare provider    Swelling, pain or redness in one leg  Date Last Reviewed: 5/1/2018 2000-2019 [employer]. [address] [place] [address], [place] [address]. All rights reserved. This information is not intended as a substitute for professional medical care. Always follow your healthcare professional's instructions.

## 2020-06-12 NOTE — TELEPHONE ENCOUNTER
Currently having a flare of her asthma.    Using her inhaler 5x per day and then feeling short of breath an hour later    Feeling short of breath at rest also    Is not able to lay down    Using her nebs 3x daily    Having chest pain    Really doesn't want to go to the ER    No fever    Wheezing all the time.    Was in the ER in march but it didn't help much and had a virtual visit in April    flovent helps for a little while but not long enough.    Linnea Garcia RN    COVID 19 Nurse Triage Plan/Patient Instructions    Please be aware that novel coronavirus (COVID-19) may be circulating in the community. If you develop symptoms such as fever, cough, or SOB or if you have concerns about the presence of another infection including coronavirus (COVID-19), please contact your health care provider or visit [email or web address].     Disposition/Instructions    Patient to schedule a Virtual Visit with provider. Reference Visit Selection Guide.    Thank you for taking steps to prevent the spread of this virus.  o Limit your contact with others.  o Wear a simple mask to cover your cough.  o Wash your hands well and often.    Resources    [facility]: About COVID-19: [email or web address]    CDC: What to Do If You're Sick: www.cdc.gov/coronavirus/2019-ncov/about/steps-when-sick.html    CDC: Ending Home Isolation: www.cdc.gov/coronavirus/2019-ncov/hcp/disposition-in-home-patients.html     CDC: Caring for Someone: www.cdc.gov/coronavirus/2019-ncov/if-you-are-sick/care-for-someone.html     [facility]: Interim Guidance for Hospital Discharge to Home: www.health.[facility].[place]/diseases/coronavirus/hcp/hospdischarge.pdf    [facility] clinical trials (COVID-19 research studies): clinicalaffairs.[facility].edu/um-clinical-trials     Below are the COVID-19 hotlines at the [facility] ([facility]). Interpreters are available.   o For health questions: Call 609-385-8534 or 1-249.205.1841 (7 a.m. to 7 p.m.)  o For  questions about schools and childcare: Call [phone] or 1-224.132.8492 (7 a.m. to 7 p.m.)                     Additional Information    Negative: Severe difficulty breathing (e.g., struggling for each breath, speak in single words, pulse > 120)    Negative: Bluish (or gray) lips or face now    Negative: Difficult to awaken or acting confused (e.g., disoriented, slurred speech)    Negative: Passed out (i.e., lost consciousness, collapsed and was not responding)    Negative: Wheezing started suddenly after medicine, an allergic food, or bee sting    Negative: Sounds like a life-threatening emergency to the triager    Negative: [1] SEVERE asthma attack (e.g., very SOB at rest, speaks in single words, loud wheezes) AND [2] not resolved after 2 nebulizer or inhaler treatments    Negative: Peak flow rate less than 50% of baseline level (RED Zone)    Negative: [1] Severe wheezing or coughing AND [2] doesn't have neb or inhaler available    Negative: Chest pain    Negative: [1] Hospitalized before with asthma AND [2] feels the same now    [1] Continuous (nonstop) coughing AND [2] keeps from working or sleeping AND [3] not improved after 2 nebulizer or inhaler treatments given 20 minutes apart    Negative: [1] MODERATE asthma attack (e.g., SOB at rest, speaks in phrases, audible wheezes) AND [2] not resolved after 2 nebulizer or inhaler treatments given 20 minutes apart    Negative: [1] Peak flow rate 50-80% of baseline level (YELLOW zone) AND [2] not resolved after 2 nebulizer or inhaler treatments given 20 minutes apart    Negative: Asthma medicine (nebulizer or inhaler) is needed more frequently than q 4 hours to keep you comfortable    Negative: Fever > 103 F (39.4 C)    Negative: [1] Fever > 101 F (38.3 C) AND [2] age > 60    Negative: Patient sounds very sick or weak to the triager    Protocols used: ASTHMA ATTACK-A-AH

## 2020-06-12 NOTE — PROGRESS NOTES
"Azul Rojas is a 24 year old female who is being evaluated via a billable telephone visit.      The patient has been notified of following:     \"This telephone visit will be conducted via a call between you and your physician/provider. We have found that certain health care needs can be provided without the need for a physical exam.  This service lets us provide the care you need with a short phone conversation.  If a prescription is necessary we can send it directly to your pharmacy.  If lab work is needed we can place an order for that and you can then stop by our lab to have the test done at a later time.    Telephone visits are billed at different rates depending on your insurance coverage. During this emergency period, for some insurers they may be billed the same as an in-person visit.  Please reach out to your insurance provider with any questions.    If during the course of the call the physician/provider feels a telephone visit is not appropriate, you will not be charged for this service.\"    Patient has given verbal consent for Telephone visit?  Yes    What phone number would you like to be contacted at? [phone]    How would you like to obtain your AVS? Mail a copy    Subjective     Azul Rojas is a 24 year old female who presents via phone visit today for the following health issues:    HPI  Patient is a 24-year-old female with history of mild to moderate persistent asthma and generalized anxiety disorder who presents via a telephone encounter for concerns of shortness of breath.  In the last 2 weeks has been noticing she has noticed several symptoms including more shortness of breath, wheezing, nonproductive cough.  She also has associated symptoms of intermittent short lasting sharp chest pain localized to the left of her chest, usually brought on after a coughing episode.  She notices occasionally her heart races fast and it skips a beat.  These episodes are short-lived as they only last " maximally about 5 seconds.  Goes away without intervention.    Her current regimen includes albuterol every 4-6 hours as needed for wheezing and shortness of breath, Flovent 1 puff twice daily as her daily controller.  She also has rescue duo nebs, nebulizer at home.  She has been adherent to all of her inhalers.  She has been using her nebulizer 3 times daily.    She does not have any known contacts that were positively identified with COVID-19.    She declined going to the ER today after talking to the nurse triage this morning and declined a video virtual visit.    Asthma Follow-Up  Was ACT completed today?    Yes    ACT Total Scores 4/20/2020   ACT TOTAL SCORE (Goal Greater than or Equal to 20) 9   In the past 12 months, how many times did you visit the emergency room for your asthma without being admitted to the hospital? 1   In the past 12 months, how many times were you hospitalized overnight because of your asthma? 0         How many days per week do you miss taking your asthma controller medication?  0    Please describe any recent triggers for your asthma: smoke, dust mites, pollens, insects/rodents and air quility     Have you had any Emergency Room Visits, Urgent Care Visits, or Hospital Admissions since your last office visit?  No      How many servings of fruits and vegetables do you eat daily?  0-1    On average, how many sweetened beverages do you drink each day (Examples: soda, juice, sweet tea, etc.  Do NOT count diet or artificially sweetened beverages)?   3    How many days per week do you exercise enough to make your heart beat faster? 4    How many minutes a day do you exercise enough to make your heart beat faster? 30 - 60    How many days per week do you miss taking your medication? 0          Reviewed and updated as needed this visit by Provider         Review of Systems   Constitutional: Negative for chills and fever.   HENT: Positive for congestion and sore throat. Negative for rhinorrhea.     Eyes: Negative for pain, discharge and itching.   Respiratory: Positive for cough, shortness of breath and wheezing.    Cardiovascular: Positive for chest pain and palpitations.   Gastrointestinal: Negative for constipation and diarrhea.   Musculoskeletal: Negative for joint swelling and myalgias.   Skin: Negative for rash and wound.   Neurological: Positive for dizziness and headaches.           Objective   Reported vitals:  There were no vitals taken for this visit.   PSYCH: Alert and oriented coherent speech, normal   rate and volume, able to articulate logical thoughts, able   to abstract reason, no tangential thoughts, no hallucinations   or delusions  Her seems nervous.    RESP: No audible cough, no audible wheezing, able to talk in full sentences without difficulty.  Remainder of exam unable to be completed due to telephone visits    Diagnostic Test Results:  Labs reviewed in Epic        Assessment/Plan:  1. Exacerbation of asthma, unspecified asthma severity, unspecified whether persistent  Her symptoms of shortness of breath sounds most consistent with an acute asthma exacerbation.  Will treat with prednisone.  I did discuss with patient these visits would be better suited in person or even through a video encounter so that we can better assess the situation however patient declined this option.  She does not seem to be in respiratory distress while talking to her on the telephone.  We will have her schedule follow-up in a week for recheck.  We will also start Tessalon for symptom control, test for COVID and refer her for  get well loop.  Gave anticipatory guidance that if the prednisone do not help and she has maximized her rescue inhaler and duo nebs without further improvement, she should go to the emergency department for evaluation of possible status asthmaticus.  - predniSONE (DELTASONE) 50 MG tablet; Take 1 tablet (50 mg) by mouth daily for 5 days  Dispense: 5 tablet; Refill: 0    2. Chest pain,  unspecified type  Seems to be related to cough.  Unable to rule out pneumonia through a telephone encounter.  Advised if symptoms persist, she needs to be evaluated in urgent care or ED as she may need a chest x-ray EKG and further work-up.    3. Shortness of breath  Management as per above.  Will refill her chronic asthma medications.  - COVID-19 [facility] Referral  - Symptomatic COVID-19 Virus (Coronavirus) by PCR; Future  - albuterol (PROAIR HFA/PROVENTIL HFA/VENTOLIN HFA) 108 (90 Base) MCG/ACT inhaler; Inhale 2 puffs into the lungs every 4 hours as needed for shortness of breath / dyspnea or wheezing  Dispense: 1 Inhaler; Refill: 11  - ipratropium - albuterol 0.5 mg/2.5 mg/3 mL (DUONEB) 0.5-2.5 (3) MG/3ML neb solution; Take 1 vial (3 mLs) by nebulization every 4 hours as needed for shortness of breath / dyspnea or wheezing  Dispense: 1 Box; Refill: 1    4. Cough  Start Tessalon  - benzonatate (TESSALON) 100 MG capsule; Take 1 capsule (100 mg) by mouth 3 times daily as needed for cough  Dispense: 30 capsule; Refill: 0    5. Moderate persistent asthma, unspecified whether complicated  - fluticasone (FLOVENT HFA) 220 MCG/ACT inhaler; Inhale 1 puff into the lungs 2 times daily  Dispense: 12 g; Refill: 3    Return in about 1 week (around 6/19/2020) for follow up URI.      Phone call duration:  16 minutes    Vincent Mendoza MD

## 2020-06-15 DIAGNOSIS — R06.02 SHORTNESS OF BREATH: ICD-10-CM

## 2020-06-15 LAB
SARS-COV-2 RNA SPEC QL NAA+PROBE: NOT DETECTED
SPECIMEN SOURCE: NORMAL

## 2020-06-15 PROCEDURE — U0003 INFECTIOUS AGENT DETECTION BY NUCLEIC ACID (DNA OR RNA); SEVERE ACUTE RESPIRATORY SYNDROME CORONAVIRUS 2 (SARS-COV-2) (CORONAVIRUS DISEASE [COVID-19]), AMPLIFIED PROBE TECHNIQUE, MAKING USE OF HIGH THROUGHPUT TECHNOLOGIES AS DESCRIBED BY CMS-2020-01-R: HCPCS | Performed by: FAMILY MEDICINE

## 2020-06-15 NOTE — LETTER
June 16, 2020        Azul Rojas  [id number]  [facility] [address]    This letter provides a written record that you were tested for COVID-19 on 6/15/20.   Your result was negative.    This means that we didn t find the virus that causes COVID-19 in your sample. A test may show negative when you do actually have the virus. This can happen when the virus is in the early stages of infection, before you feel illness symptoms.    Even if you don t have symptoms, they may still appear. For safety, it s very important to follow these rules.    Keep yourself away from others (self-isolation):      Stay home. Don t go to work, school or anywhere else.     Stay in your own room (and use your own bathroom), if you can.    Stay away from others in your home. No hugging, kissing or shaking hands. No visitors.    Clean  high touch  surfaces often (doorknobs, counters, handles, etc.). Use a household cleaning spray or wipes.    Cover your mouth and nose with a mask, tissue or washcloth to avoid spreading germs.    Wash your hands and face often with soap and water.    Stay in self-isolation until you meet ALL of the guidelines below:    1. You have had no fever for at least 72 hours (that is 3 full days of no fever without the use of medicine that reduces fevers), AND  2. other symptoms (such as cough, shortness of breath) have gotten better, AND  3. at least 10 days have passed since your symptoms first appeared.    Going back to work  Check with your employer for any guidelines to follow for going back to work.    Employers: This document serves as formal notice that your employee tested negative for COVID-19, as of the testing date shown above.    For questions regarding this letter or your Negative COVID-19 result, call [phone] between 8A to 6:30P (M-F) and 10A to 6:30P (weekends).

## 2020-06-19 ENCOUNTER — OFFICE VISIT (OUTPATIENT)
Dept: FAMILY MEDICINE | Facility: CLINIC | Age: 25
End: 2020-06-19
Payer: COMMERCIAL

## 2020-06-19 VITALS
SYSTOLIC BLOOD PRESSURE: 122 MMHG | WEIGHT: 181.6 LBS | HEART RATE: 82 BPM | DIASTOLIC BLOOD PRESSURE: 78 MMHG | BODY MASS INDEX: 29.18 KG/M2 | OXYGEN SATURATION: 96 % | HEIGHT: 66 IN | TEMPERATURE: 97.8 F | RESPIRATION RATE: 18 BRPM

## 2020-06-19 DIAGNOSIS — F41.9 ANXIETY AND DEPRESSION: ICD-10-CM

## 2020-06-19 DIAGNOSIS — F32.A ANXIETY AND DEPRESSION: ICD-10-CM

## 2020-06-19 DIAGNOSIS — J45.909 UNCOMPLICATED ASTHMA, UNSPECIFIED ASTHMA SEVERITY, UNSPECIFIED WHETHER PERSISTENT: Primary | ICD-10-CM

## 2020-06-19 PROCEDURE — 99213 OFFICE O/P EST LOW 20 MIN: CPT | Performed by: FAMILY MEDICINE

## 2020-06-19 RX ORDER — HYDROXYZINE HYDROCHLORIDE 10 MG/1
10 TABLET, FILM COATED ORAL 3 TIMES DAILY PRN
Qty: 30 TABLET | Refills: 0 | Status: SHIPPED | OUTPATIENT
Start: 2020-06-19 | End: 2020-08-11

## 2020-06-19 RX ORDER — ESCITALOPRAM OXALATE 10 MG/1
10 TABLET ORAL DAILY
Qty: 30 TABLET | Refills: 1 | Status: SHIPPED | OUTPATIENT
Start: 2020-06-19 | End: 2020-07-20

## 2020-06-19 ASSESSMENT — ENCOUNTER SYMPTOMS
SHORTNESS OF BREATH: 1
SLEEP DISTURBANCE: 1

## 2020-06-19 ASSESSMENT — MIFFLIN-ST. JEOR: SCORE: 1600.48

## 2020-06-19 NOTE — PROGRESS NOTES
"Azul Rojas is a 24 year old female who is being evaluated via a billable telephone visit.      The patient has been notified of following:     \"This telephone visit will be conducted via a call between you and your physician/provider. We have found that certain health care needs can be provided without the need for a physical exam.  This service lets us provide the care you need with a short phone conversation.  If a prescription is necessary we can send it directly to your pharmacy.  If lab work is needed we can place an order for that and you can then stop by our lab to have the test done at a later time.    Telephone visits are billed at different rates depending on your insurance coverage. During this emergency period, for some insurers they may be billed the same as an in-person visit.  Please reach out to your insurance provider with any questions.    If during the course of the call the physician/provider feels a telephone visit is not appropriate, you will not be charged for this service.\"    Patient has given verbal consent for Telephone visit?  {YES-NO  Default Yes:4444::\"Yes\"}    What phone number would you like to be contacted at? ***    How would you like to obtain your AVS? {AVS Preference:273157}    Subjective     Azul Rojas is a 24 year old female who presents via phone visit today for the following health issues:    HPI  {SUPERLIST (Optional):021188}  {PEDS Chronic and Acute Problems (Optional):846900}     {additonal problems for provider to add (Optional):483071}    {HIST REVIEW/ LINKS 2 (Optional):150466}    Reviewed and updated as needed this visit by Provider         Review of Systems   {ROS COMP (Optional):196287}       Objective   Reported vitals:  There were no vitals taken for this visit.   {GENERAL APPEARANCE:50::\"healthy\",\"alert\",\"no distress\"}  PSYCH: Alert and oriented times 3; coherent speech, normal   rate and volume, able to articulate logical thoughts, able   to abstract " "reason, no tangential thoughts, no hallucinations   or delusions  Her affect is { :6246289::\"normal\"}  RESP: No cough, no audible wheezing, able to talk in full sentences  Remainder of exam unable to be completed due to telephone visits    {Diagnostic Test Results (Optional):834454::\"Diagnostic Test Results:\",\"Labs reviewed in Epic\"}        Assessment/Plan:  {Diagnosis, Associated Orders and Comment:251495}    No follow-ups on file.      Phone call duration:  *** minutes    {signature options:919137}        "

## 2020-06-19 NOTE — PATIENT INSTRUCTIONS
Patient Education     Depression Affects Your Mind and Body  Everyone feels sad or  blue  from time to time for a few days or weeks. Depression is when these feelings don't go away and they interfere with daily life.  Depression is a real illness that can develop at any age. It is one of the most common mental health problems in the [place] Depression makes you feel sad, helpless, and hopeless. It gets in the way of your life and relationships. It inhibits your ability to think and act. But, with help, you can feel better again.      When I was depressed, I felt awful. I was so tired all the time I could hardly think, but at night I couldn t fall asleep. My head hurt. My stomach hurt. I didn t know what was wrong with me.    Depression affects your whole body  Brain chemicals affect your body as well as your mood. So depression may do more than just make you feel low. You may also feel bad physically. Depression can:    Cause trouble with mental tasks such as remembering, concentrating, or making decisions    Make you feel nervous and jumpy    Cause trouble sleeping. Or you may sleep too much    Change your appetite    Cause headaches, stomachaches, or other aches and pains    Drain your body of energy  Depression and other illness  It is common for people who have chronic health problems to also have depression. It can often be hard to tell which one caused the other. A person might become depressed after finding out they have a health problem. But some studies suggest being depressed may make certain health problems more likely. And some depressed people stop taking care of themselves. This may make them more likely to get sick.  Date Last Reviewed: 1/1/2017 2000-2019 [employer]. [address] [place] [address], [place] [address]. All rights reserved. This information is not intended as a substitute for professional medical care. Always follow your healthcare professional's instructions.

## 2020-06-19 NOTE — PROGRESS NOTES
Subjective     Azul Rojas is a 24 year old female who presents to clinic today for the following health issues:    HPI   Asthma Follow-Up    Was ACT completed today?    Yes    ACT Total Scores 6/19/2020   ACT TOTAL SCORE (Goal Greater than or Equal to 20) 7   In the past 12 months, how many times did you visit the emergency room for your asthma without being admitted to the hospital? 1   In the past 12 months, how many times were you hospitalized overnight because of your asthma? 0         How many days per week do you miss taking your asthma controller medication?  Forgets about once a day-Having insurance issues for Flovent Inhaler    Please describe any recent triggers for your asthma: Weather     Have you had any Emergency Room Visits, Urgent Care Visits, or Hospital Admissions since your last office visit?  No      How many servings of fruits and vegetables do you eat daily?  0-1    On average, how many sweetened beverages do you drink each day (Examples: soda, juice, sweet tea, etc.  Do NOT count diet or artificially sweetened beverages)?   4    How many days per week do you exercise enough to make your heart beat faster? 3 or less    How many minutes a day do you exercise enough to make your heart beat faster? 10 - 19    How many days per week do you miss taking your medication? Misses doses of Flovent throughout the week. Notes she has had issues as well with insurance covering said medication.         Patient is a presumed diagnosis of asthma and was recently evaluated by me for a possible acute asthma exacerbation.  She has not had formal testing.  In the interim, COVID test negative.  She was given short prednisone course and given a daily controller inhaler.  Did not get Flovent covered by insurance subsequently has not started.  She did great improvement on prednisone.    When talking about her life stressors, Azul became very tearful.  Has been in several difficult relationships, feels taken  "advantage of.  Strong family history of mental health issues.  She has had thoughts where her life would be easier if she is not around however states she has no active plan of killing herself.  Her current support system is her parents, states she has a good relationship with them, currently living at home.    Reviewed and updated as needed this visit by Provider         Review of Systems   Respiratory: Positive for shortness of breath.    Psychiatric/Behavioral: Positive for sleep disturbance and suicidal ideas.            Objective    /78 (BP Location: Right arm, Patient Position: Chair, Cuff Size: Adult Regular)   Pulse 82   Temp 97.8  F (36.6  C) (Oral)   Resp 18   Ht 1.676 m (5' 6\")   Wt 82.4 kg (181 lb 9.6 oz)   SpO2 96%   BMI 29.31 kg/m    Body mass index is 29.31 kg/m .  Physical Exam  Vitals signs reviewed.   Constitutional:       Appearance: She is not ill-appearing.   Cardiovascular:      Rate and Rhythm: Normal rate.   Pulmonary:      Effort: Pulmonary effort is normal.      Breath sounds: Normal breath sounds.   Psychiatric:      Comments: Emotionally labile.  Guarded.          Diagnostic Test Results:  Labs reviewed in Epic        Assessment & Plan     1. Anxiety and depression  Uncontrolled.  While having asthma may be possible, it seems some of her symptoms may be somatized from her underlying uncontrolled mental health problems.  Exam findings without any evidence of wheezing or bronchoconstriction.  Her nighttime awakening symptoms may be related to PTSD.  Continue to strengthen therapeutic alliance with patient to help with diagnostic clarification.  In the interim, increase Lexapro to 10 mg, start Atarax as needed for anxiety and can trial it when she is having short of breath to see if this helps her symptoms.  Safety plan reviewed, go to emergency department if actively suicidal.  Follow-up in 1 month or sooner if symptoms uncontrolled.  - escitalopram (LEXAPRO) 10 MG tablet; Take " 1 tablet (10 mg) by mouth daily  Dispense: 30 tablet; Refill: 1  - hydrOXYzine (ATARAX) 10 MG tablet; Take 1 tablet (10 mg) by mouth 3 times daily as needed for anxiety  Dispense: 30 tablet; Refill: 0     2. Uncomplicated asthma, unspecified asthma severity, unspecified whether persistent  Management as per above.      Return in about 1 month (around 7/19/2020) for anxiety and depression followup..    Vincent Mendoza MD  [facility]

## 2020-06-20 ASSESSMENT — ASTHMA QUESTIONNAIRES: ACT_TOTALSCORE: 7

## 2020-06-23 ENCOUNTER — OFFICE VISIT (OUTPATIENT)
Dept: URGENT CARE | Facility: URGENT CARE | Age: 25
End: 2020-06-23
Payer: COMMERCIAL

## 2020-06-23 ENCOUNTER — ANCILLARY PROCEDURE (OUTPATIENT)
Dept: GENERAL RADIOLOGY | Facility: CLINIC | Age: 25
End: 2020-06-23
Attending: PHYSICIAN ASSISTANT
Payer: COMMERCIAL

## 2020-06-23 VITALS
TEMPERATURE: 98.7 F | BODY MASS INDEX: 29.68 KG/M2 | OXYGEN SATURATION: 98 % | DIASTOLIC BLOOD PRESSURE: 68 MMHG | HEART RATE: 84 BPM | WEIGHT: 183.9 LBS | RESPIRATION RATE: 16 BRPM | SYSTOLIC BLOOD PRESSURE: 110 MMHG

## 2020-06-23 DIAGNOSIS — S99.922A FOOT INJURY, LEFT, INITIAL ENCOUNTER: ICD-10-CM

## 2020-06-23 DIAGNOSIS — S99.922A FOOT INJURY, LEFT, INITIAL ENCOUNTER: Primary | ICD-10-CM

## 2020-06-23 PROCEDURE — 99213 OFFICE O/P EST LOW 20 MIN: CPT | Performed by: PHYSICIAN ASSISTANT

## 2020-06-23 PROCEDURE — 73630 X-RAY EXAM OF FOOT: CPT | Mod: LT

## 2020-06-23 ASSESSMENT — ENCOUNTER SYMPTOMS: WOUND: 0

## 2020-06-23 NOTE — PROGRESS NOTES
SUBJECTIVE:   Azul Rojas is a 24 year old female presenting with a chief complaint of   Chief Complaint   Patient presents with     Urgent Care     Musculoskeletal Problem     Left foot injury-Horse accident-DOI 06/22/20       She is an established patient of [facility].    MS Injury/Pain    Onset of symptoms was 1 day(s) ago.  Location: left foot/left heel  Context:       The injury happened while riding her horse      Mechanism: misplaced foot from saddle, and landed awkwardly on the left foot/heel.       Patient experienced immediate pain  Course of symptoms is same.    Severity moderate  Current and Associated symptoms: Pain and Tenderness over left heel, unable to bear weight on left heel due to pain.  Denies  Swelling, Bruising, Warmth and Redness  Aggravating Factors: walking and weight-bearing  Therapies to improve symptoms include: none  This is not the first time this type of problem has occurred for this patient.       Review of Systems   Musculoskeletal:        Left foot injury   Skin: Negative for wound.       Past Medical History:   Diagnosis Date     Anxiety and depression      Shortness of breath      Family History   Problem Relation Age of Onset     Family History Negative Mother      Diabetes Maternal Grandmother      Diabetes Maternal Grandfather      Current Outpatient Medications   Medication Sig Dispense Refill     albuterol (PROAIR HFA/PROVENTIL HFA/VENTOLIN HFA) 108 (90 Base) MCG/ACT inhaler Inhale 2 puffs into the lungs every 4 hours as needed for shortness of breath / dyspnea or wheezing 1 Inhaler 11     benzonatate (TESSALON) 100 MG capsule Take 1 capsule (100 mg) by mouth 3 times daily as needed for cough 30 capsule 0     escitalopram (LEXAPRO) 10 MG tablet Take 1 tablet (10 mg) by mouth daily 30 tablet 1     fluticasone (FLOVENT HFA) 220 MCG/ACT inhaler Inhale 1 puff into the lungs 2 times daily 12 g 3     hydrOXYzine (ATARAX) 10 MG tablet Take 1 tablet (10 mg) by mouth 3 times  daily as needed for anxiety 30 tablet 0     ipratropium - albuterol 0.5 mg/2.5 mg/3 mL (DUONEB) 0.5-2.5 (3) MG/3ML neb solution Take 1 vial (3 mLs) by nebulization every 4 hours as needed for shortness of breath / dyspnea or wheezing 1 Box 1     norethindrone-ethinyl estradiol (JUNEL FE 1/20) 1-20 MG-MCG tablet Take 1 tablet by mouth daily 56 tablet 0     Social History     Tobacco Use     Smoking status: Former Smoker     Smokeless tobacco: Former User   Substance Use Topics     Alcohol use: No       OBJECTIVE  /68 (BP Location: Right arm, Patient Position: Sitting, Cuff Size: Adult Large)   Pulse 84   Temp 98.7  F (37.1  C) (Oral)   Resp 16   Wt 83.4 kg (183 lb 14.4 oz)   SpO2 98%   Breastfeeding No   BMI 29.68 kg/m      Physical Exam  Constitutional:       General: She is not in acute distress.     Appearance: She is well-developed.   HENT:      Head: Normocephalic and atraumatic.      Right Ear: External ear normal.      Left Ear: External ear normal.   Eyes:      Conjunctiva/sclera: Conjunctivae normal.   Neck:      Musculoskeletal: Normal range of motion.   Pulmonary:      Effort: Pulmonary effort is normal. No respiratory distress.   Musculoskeletal:         General: Tenderness present. No deformity.      Comments: Left foot exam: No gross deformities, swelling or ecchymosis noted. Tenderness over left heel. Unable to bear weight on left heel. No tenderness over medial or lateral malleolus. No tenderness over dorsum of the left foot. Achilles tendon is intact.    Skin:     General: Skin is warm and dry.   Neurological:      Mental Status: She is alert.         Labs:    X-Ray was done, my findings are: negative for acute fracture or dislocation.    ASSESSMENT:      ICD-10-CM    1. Foot injury, left, initial encounter  S99.922A XR Foot Left G/E 3 Views          PLAN:    Left foot injury: Xray today negative for acute fracture or dislocation. Offered walking boot to help with ambulation. Patient  declines. Recommended RICE. Weightbearing as tolerated. Tylenol or motrin prn pain. Would anticipate gradual improvement. Follow up if any worsening symptoms. Patient agrees.     Followup:    If not improving or if condition worsens, follow up with your Primary Care Provider    Patient Instructions            Patient Education     RICE    RICE stands for rest, ice, compression, and elevation. Doing these things helps limit pain and swelling after an injury. RICE also helps injuries heal faster. Use RICE for sprains, strains, and severe bruises or bumps. Follow the tips on this handout and begin RICE as soon as possible after an injury.  Rest  Pain is your body s way of telling you to rest an injured area. Whether you have hurt an elbow, hand, foot, or knee, limiting its use will prevent further injury and help you heal.  Ice  Applying ice right after an injury helps prevent swelling and reduce pain. Don t place ice directly on your skin.    Wrap a cold pack or bag of ice in a thin cloth. Place it over the injured area.    Ice for 10 minutes every 3 hours. Don t ice for more than 20 minutes at a time.  Compression  Putting pressure (compression) on an injury helps prevent swelling and provides support.    Wrap the injured area firmly with an elastic bandage. If your hand or foot tingles, becomes discolored, or feels cold to the touch, the bandage may be too tight. Rewrap it more loosely.    If your bandage becomes too loose, rewrap it.    Do not wear an elastic bandage overnight.  Elevation  Keeping an injury elevated helps reduce swelling, pain, and throbbing. Elevation is most effective when the injury is kept elevated higher than the heart.     Call your healthcare provider if you notice any of the following:    Fingers or toes feel numb, are cold to the touch, or change color.    Skin looks shiny or tight.    Pain, swelling, or bruising worsens and is not improved with elevation.   Date Last Reviewed: 5/1/2018     7484-6336 [employer]. [address] [place] [address], [place] [address]. All rights reserved. This information is not intended as a substitute for professional medical care. Always follow your healthcare professional's instructions.

## 2020-06-23 NOTE — PATIENT INSTRUCTIONS
Patient Education     RICE    RICE stands for rest, ice, compression, and elevation. Doing these things helps limit pain and swelling after an injury. RICE also helps injuries heal faster. Use RICE for sprains, strains, and severe bruises or bumps. Follow the tips on this handout and begin RICE as soon as possible after an injury.  Rest  Pain is your body s way of telling you to rest an injured area. Whether you have hurt an elbow, hand, foot, or knee, limiting its use will prevent further injury and help you heal.  Ice  Applying ice right after an injury helps prevent swelling and reduce pain. Don t place ice directly on your skin.    Wrap a cold pack or bag of ice in a thin cloth. Place it over the injured area.    Ice for 10 minutes every 3 hours. Don t ice for more than 20 minutes at a time.  Compression  Putting pressure (compression) on an injury helps prevent swelling and provides support.    Wrap the injured area firmly with an elastic bandage. If your hand or foot tingles, becomes discolored, or feels cold to the touch, the bandage may be too tight. Rewrap it more loosely.    If your bandage becomes too loose, rewrap it.    Do not wear an elastic bandage overnight.  Elevation  Keeping an injury elevated helps reduce swelling, pain, and throbbing. Elevation is most effective when the injury is kept elevated higher than the heart.     Call your healthcare provider if you notice any of the following:    Fingers or toes feel numb, are cold to the touch, or change color.    Skin looks shiny or tight.    Pain, swelling, or bruising worsens and is not improved with elevation.   Date Last Reviewed: 5/1/2018 2000-2019 [employer]. [address] [place] [address], [place] [address]. All rights reserved. This information is not intended as a substitute for professional medical care. Always follow your healthcare professional's instructions.

## 2020-07-07 ENCOUNTER — VIRTUAL VISIT (OUTPATIENT)
Dept: FAMILY MEDICINE | Facility: CLINIC | Age: 25
End: 2020-07-07
Payer: COMMERCIAL

## 2020-07-07 DIAGNOSIS — J45.41 MODERATE PERSISTENT ASTHMA WITH ACUTE EXACERBATION: Primary | ICD-10-CM

## 2020-07-07 PROCEDURE — 99213 OFFICE O/P EST LOW 20 MIN: CPT | Mod: 95 | Performed by: FAMILY MEDICINE

## 2020-07-07 RX ORDER — FLUTICASONE PROPIONATE AND SALMETEROL 113; 14 UG/1; UG/1
1 POWDER, METERED RESPIRATORY (INHALATION) 2 TIMES DAILY
Qty: 1 INHALER | Refills: 0 | Status: SHIPPED | OUTPATIENT
Start: 2020-07-07 | End: 2020-11-17

## 2020-07-07 RX ORDER — PREDNISONE 20 MG/1
40 TABLET ORAL DAILY
Qty: 10 TABLET | Refills: 0 | Status: SHIPPED | OUTPATIENT
Start: 2020-07-07 | End: 2020-08-17

## 2020-07-07 RX ORDER — MONTELUKAST SODIUM 10 MG/1
10 TABLET ORAL AT BEDTIME
Qty: 30 TABLET | Refills: 1 | Status: SHIPPED | OUTPATIENT
Start: 2020-07-07 | End: 2020-08-17

## 2020-07-07 NOTE — LETTER
My Asthma Action Plan    Name: Azul Rojas   YOB: 1995  Date: 7/7/2020   My doctor: Shaka Nichols MD   My clinic: [facility]        My Control Medicine: Fluticasone propionate + salmeterol (Generic) - 113/14 mcg 2 times daily  Montelukast (Singulair) -  10 mg at night  My Rescue Medicine: Albuterol (Proair/Ventolin/Proventil HFA) 2-4 puffs EVERY 4 HOURS as needed. Use a spacer if recommended by your provider.  Albuterol and Ipratropium (Duoneb) nebulizer solution as needed  My Oral Steroid Medicine: prednisone 5 days My Asthma Severity:   Moderate Persistent  Know your asthma triggers  pollens  animal dander  humidity  exercise or sports            GREEN ZONE   Good Control    I feel good    No cough or wheeze    Can work, sleep and play without asthma symptoms       Take your asthma control medicine every day.     1. If exercise triggers your asthma, take your rescue medication    15 minutes before exercise or sports, and    During exercise if you have asthma symptoms  2. Spacer to use with inhaler: If you have a spacer, make sure to use it with your inhaler             YELLOW ZONE Getting Worse  I have ANY of these:    I do not feel good    Cough or wheeze    Chest feels tight    Wake up at night   1. Keep taking your Green Zone medications  2. Start taking your rescue medicine:    every 20 minutes for up to 1 hour. Then every 4 hours for 24-48 hours.  3. If you stay in the Yellow Zone for more than 12-24 hours, contact your doctor.  4. If you do not return to the Green Zone in 12-24 hours or you get worse, start taking your oral steroid medicine if prescribed by your provider.           RED ZONE Medical Alert - Get Help  I have ANY of these:    I feel awful    Medicine is not helping    Breathing getting harder    Trouble walking or talking    Nose opens wide to breathe       1. Take your rescue medicine NOW  2. If your provider has prescribed an oral steroid medicine,  start taking it NOW  3. Call your doctor NOW  4. If you are still in the Red Zone after 20 minutes and you have not reached your doctor:    Take your rescue medicine again and    Call 911 or go to the emergency room right away    See your regular doctor within 2 weeks of an Emergency Room or Urgent Care visit for follow-up treatment.          Annual Reminders:  Meet with Asthma Educator,  Flu Shot in the Fall, consider Pneumonia Vaccination for patients with asthma (aged 19 and older).    Pharmacy: [id number] [id number]  [address]    Electronically signed by Shaka Nichols MD   Date: 07/07/20                      Asthma Triggers  How To Control Things That Make Your Asthma Worse    Triggers are things that make your asthma worse.  Look at the list below to help you find your triggers and what you can do about them.  You can help prevent asthma flare-ups by staying away from your triggers.      Trigger                                                          What you can do   Cigarette Smoke  Tobacco smoke can make asthma worse. Do not allow smoking in your home, car or around you.  Be sure no one smokes at a child s day care or school.  If you smoke, ask your health care provider for ways to help you quit.  Ask family members to quit too.  Ask your health care provider for a referral to Quit Plan to help you quit smoking, or call 7-453-505-PLAN.     Colds, Flu, Bronchitis  These are common triggers of asthma. Wash your hands often.  Don t touch your eyes, nose or mouth.  Get a flu shot every year.     Dust Mites  These are tiny bugs that live in cloth or carpet. They are too small to see. Wash sheets and blankets in hot water every week.   Encase pillows and mattress in dust mite proof covers.  Avoid having carpet if you can. If you have carpet, vacuum weekly.   Use a dust mask and HEPA vacuum.   Pollen and Outdoor Mold  Some people are allergic to trees, grass, or weed pollen, or  molds. Try to keep your windows closed.  Limit time out doors when pollen count is high.   Ask you health care provider about taking medicine during allergy season.     Animal Dander  Some people are allergic to skin flakes, urine or saliva from pets with fur or feathers. Keep pets with fur or feathers out of your home.    If you can t keep the pet outdoors, then keep the pet out of your bedroom.  Keep the bedroom door closed.  Keep pets off cloth furniture and away from stuffed toys.     Mice, Rats, and Cockroaches   Some people are allergic to the waste from these pests.   Cover food and garbage.  Clean up spills and food crumbs.  Store grease in the refrigerator.   Keep food out of the bedroom.   Indoor Mold  This can be a trigger if your home has high moisture. Fix leaking faucets, pipes, or other sources of water.   Clean moldy surfaces.  Dehumidify basement if it is damp and smelly.   Smoke, Strong Odors, and Sprays  These can reduce air quality. Stay away from strong odors and sprays, such as perfume, powder, hair spray, paints, smoke incense, paint, cleaning products, candles and new carpet.   Exercise or Sports  Some people with asthma have this trigger. Be active!  Ask your doctor about taking medicine before sports or exercise to prevent symptoms.    Warm up for 5-10 minutes before and after sports or exercise.     Other Triggers of Asthma  Cold air:  Cover your nose and mouth with a scarf.  Sometimes laughing or crying can be a trigger.  Some medicines and food can trigger asthma.

## 2020-07-07 NOTE — PROGRESS NOTES
"Azul Rojas is a 24 year old female who is being evaluated via a billable telephone visit.      The patient has been notified of following:     \"This telephone visit will be conducted via a call between you and your physician/provider. We have found that certain health care needs can be provided without the need for a physical exam.  This service lets us provide the care you need with a short phone conversation.  If a prescription is necessary we can send it directly to your pharmacy.  If lab work is needed we can place an order for that and you can then stop by our lab to have the test done at a later time.    Telephone visits are billed at different rates depending on your insurance coverage. During this emergency period, for some insurers they may be billed the same as an in-person visit.  Please reach out to your insurance provider with any questions.    If during the course of the call the physician/provider feels a telephone visit is not appropriate, you will not be charged for this service.\"    Patient has given verbal consent for Telephone visit?  Yes    What phone number would you like to be contacted at?     How would you like to obtain your AVS?     Subjective     Azul Rojas is a 24 year old female who presents via phone visit today for the following health issues:    HPI    Asthma Follow-Up    Was ACT completed today?    Yes    ACT Total Scores 7/7/2020   ACT TOTAL SCORE (Goal Greater than or Equal to 20) 8   In the past 12 months, how many times did you visit the emergency room for your asthma without being admitted to the hospital? 0   In the past 12 months, how many times were you hospitalized overnight because of your asthma? 0         How many days per week do you miss taking your asthma controller medication?  0    Please describe any recent triggers for your asthma: pollens, animal dander, humidity and exercise or sports    Have you had any Emergency Room Visits, Urgent Care Visits, or " Hospital Admissions since your last office visit?  No      How many servings of fruits and vegetables do you eat daily?  0-1    On average, how many sweetened beverages do you drink each day (Examples: soda, juice, sweet tea, etc.  Do NOT count diet or artificially sweetened beverages)?   3    How many days per week do you exercise enough to make your heart beat faster? 3 or less    How many minutes a day do you exercise enough to make your heart beat faster? 9 or less    How many days per week do you miss taking your medication? 0        Patient Active Problem List   Diagnosis     Major depressive disorder, recurrent episode, severe (H)     Generalized anxiety disorder     Cannabis abuse     Esophageal reflux (GERD)     History reviewed. No pertinent surgical history.    Social History     Tobacco Use     Smoking status: Former Smoker     Smokeless tobacco: Former User   Substance Use Topics     Alcohol use: No     Family History   Problem Relation Age of Onset     Family History Negative Mother      Diabetes Maternal Grandmother      Diabetes Maternal Grandfather            Patient with shortness of breath again after exposure to a cat that she feels she could be allergic to as well as increased heat and humidity.  Has had improvement with prednisone in the past.  Has not been using a daily inhaler as her insurance was not covering it.  She has been using DuoNeb and albuterol as needed several times a day.  Denies chest pain or fever.  She can speak in complete sentences and is not short of breath at rest.  She has not had formal spirometry but diagnosed with asthma from the ER.         Patient Active Problem List   Diagnosis     Major depressive disorder, recurrent episode, severe (H)     Generalized anxiety disorder     Cannabis abuse     Esophageal reflux (GERD)     History reviewed. No pertinent surgical history.    Social History     Tobacco Use     Smoking status: Former Smoker     Smokeless tobacco: Former  User   Substance Use Topics     Alcohol use: No     Family History   Problem Relation Age of Onset     Family History Negative Mother      Diabetes Maternal Grandmother      Diabetes Maternal Grandfather            Reviewed and updated as needed this visit by Provider         Review of Systems   CONSTITUTIONAL: No fever  RESP: As above  CV: Denies chest pain       Objective   Reported vitals:  There were no vitals taken for this visit.   healthy, alert and no distress  PSYCH: Alert and oriented times 3; coherent speech, normal   rate and volume, able to articulate logical thoughts, able   to abstract reason, no tangential thoughts, no hallucinations   or delusions  Her affect is normal  RESP: No cough, no audible wheezing, able to talk in full sentences  Remainder of exam unable to be completed due to telephone visits            Assessment/Plan:  1. Moderate persistent asthma with acute exacerbation  Asthma based on history and prior exams.  Recommend daily treatments with ICS/LABA and consider adding Singulair as well as she has had multiple exacerbations recently and prior delay in getting her ICS filled so we need to make sure she gets on something daily.  May have cat allergies as well so this may help allergies and asthma.  Discussed 5-day prednisone burst.  Follow-up in 2 weeks with primary care provider already scheduled.  Asthma action plan completed and will be mailed.  - fluticasone-salmeterol (AIRDUO RESPICLICK) 113-14 MCG/ACT inhaler; Inhale 1 puff into the lungs 2 times daily  Dispense: 1 Inhaler; Refill: 0  - montelukast (SINGULAIR) 10 MG tablet; Take 1 tablet (10 mg) by mouth At Bedtime  Dispense: 30 tablet; Refill: 1  - predniSONE (DELTASONE) 20 MG tablet; Take 2 tablets (40 mg) by mouth daily for 5 days  Dispense: 10 tablet; Refill: 0    Return in about 13 days (around 7/20/2020).      Phone call duration:  12 minutes    Shaka Nichols MD

## 2020-07-07 NOTE — PROGRESS NOTES
"Subjective     Azul Rojas is a 24 year old female who presents to clinic today for the following health issues:    HPI   Asthma Follow-Up    Was ACT completed today?    Yes    ACT Total Scores 7/7/2020   ACT TOTAL SCORE (Goal Greater than or Equal to 20) 8   In the past 12 months, how many times did you visit the emergency room for your asthma without being admitted to the hospital? 0   In the past 12 months, how many times were you hospitalized overnight because of your asthma? 0         How many days per week do you miss taking your asthma controller medication?  0    Please describe any recent triggers for your asthma: pollens, animal dander, humidity and exercise or sports    Have you had any Emergency Room Visits, Urgent Care Visits, or Hospital Admissions since your last office visit?  No      How many servings of fruits and vegetables do you eat daily?  0-1    On average, how many sweetened beverages do you drink each day (Examples: soda, juice, sweet tea, etc.  Do NOT count diet or artificially sweetened beverages)?   3    How many days per week do you exercise enough to make your heart beat faster? 3 or less    How many minutes a day do you exercise enough to make your heart beat faster? 9 or less    How many days per week do you miss taking your medication? 0        {HIST REVIEW/ LINKS 2 (Optional):195337}    {Additional problems for the provider to add (optional):299865}  Reviewed and updated as needed this visit by Provider         Review of Systems   {ROS COMP (Optional):722759}      Objective    There were no vitals taken for this visit.  There is no height or weight on file to calculate BMI.  Physical Exam   {Exam List (Optional):361152}    {Diagnostic Test Results (Optional):876776::\"Diagnostic Test Results:\",\"Labs reviewed in Epic\"}        {PROVIDER CHARTING PREFERENCE:298124}        "

## 2020-07-08 ASSESSMENT — ASTHMA QUESTIONNAIRES: ACT_TOTALSCORE: 8

## 2020-07-20 ENCOUNTER — OFFICE VISIT (OUTPATIENT)
Dept: FAMILY MEDICINE | Facility: CLINIC | Age: 25
End: 2020-07-20
Payer: COMMERCIAL

## 2020-07-20 VITALS
WEIGHT: 183 LBS | TEMPERATURE: 97.7 F | HEART RATE: 83 BPM | OXYGEN SATURATION: 97 % | RESPIRATION RATE: 18 BRPM | BODY MASS INDEX: 29.41 KG/M2 | HEIGHT: 66 IN | SYSTOLIC BLOOD PRESSURE: 122 MMHG | DIASTOLIC BLOOD PRESSURE: 80 MMHG

## 2020-07-20 DIAGNOSIS — F51.05 INSOMNIA DUE TO OTHER MENTAL DISORDER: ICD-10-CM

## 2020-07-20 DIAGNOSIS — F99 INSOMNIA DUE TO OTHER MENTAL DISORDER: ICD-10-CM

## 2020-07-20 DIAGNOSIS — F33.2 SEVERE EPISODE OF RECURRENT MAJOR DEPRESSIVE DISORDER, WITHOUT PSYCHOTIC FEATURES (H): Primary | ICD-10-CM

## 2020-07-20 DIAGNOSIS — F41.1 GENERALIZED ANXIETY DISORDER: ICD-10-CM

## 2020-07-20 DIAGNOSIS — F41.9 ANXIETY AND DEPRESSION: ICD-10-CM

## 2020-07-20 DIAGNOSIS — F32.A ANXIETY AND DEPRESSION: ICD-10-CM

## 2020-07-20 DIAGNOSIS — R06.02 SHORTNESS OF BREATH: ICD-10-CM

## 2020-07-20 PROCEDURE — 99214 OFFICE O/P EST MOD 30 MIN: CPT | Performed by: FAMILY MEDICINE

## 2020-07-20 RX ORDER — TRAZODONE HYDROCHLORIDE 50 MG/1
50 TABLET, FILM COATED ORAL AT BEDTIME
Qty: 30 TABLET | Refills: 1 | Status: SHIPPED | OUTPATIENT
Start: 2020-07-20 | End: 2020-08-17

## 2020-07-20 RX ORDER — ESCITALOPRAM OXALATE 20 MG/1
10 TABLET ORAL DAILY
Qty: 30 TABLET | Refills: 1 | Status: SHIPPED | OUTPATIENT
Start: 2020-07-20 | End: 2020-08-17

## 2020-07-20 ASSESSMENT — ANXIETY QUESTIONNAIRES
3. WORRYING TOO MUCH ABOUT DIFFERENT THINGS: NEARLY EVERY DAY
6. BECOMING EASILY ANNOYED OR IRRITABLE: NEARLY EVERY DAY
GAD7 TOTAL SCORE: 21
7. FEELING AFRAID AS IF SOMETHING AWFUL MIGHT HAPPEN: NEARLY EVERY DAY
IF YOU CHECKED OFF ANY PROBLEMS ON THIS QUESTIONNAIRE, HOW DIFFICULT HAVE THESE PROBLEMS MADE IT FOR YOU TO DO YOUR WORK, TAKE CARE OF THINGS AT HOME, OR GET ALONG WITH OTHER PEOPLE: VERY DIFFICULT
5. BEING SO RESTLESS THAT IT IS HARD TO SIT STILL: NEARLY EVERY DAY
2. NOT BEING ABLE TO STOP OR CONTROL WORRYING: NEARLY EVERY DAY
1. FEELING NERVOUS, ANXIOUS, OR ON EDGE: NEARLY EVERY DAY

## 2020-07-20 ASSESSMENT — ENCOUNTER SYMPTOMS
CHEST TIGHTNESS: 1
SHORTNESS OF BREATH: 1
NERVOUS/ANXIOUS: 1
SLEEP DISTURBANCE: 1
DECREASED CONCENTRATION: 1

## 2020-07-20 ASSESSMENT — PATIENT HEALTH QUESTIONNAIRE - PHQ9
5. POOR APPETITE OR OVEREATING: NEARLY EVERY DAY
SUM OF ALL RESPONSES TO PHQ QUESTIONS 1-9: 24

## 2020-07-20 ASSESSMENT — MIFFLIN-ST. JEOR: SCORE: 1606.83

## 2020-07-20 NOTE — PROGRESS NOTES
Subjective     Azul Rojas is a 24 year old female who presents to clinic today for the following health issues:    HPI     Depression and Anxiety Follow-Up    How are you doing with your depression since your last visit? No change    How are you doing with your anxiety since your last visit?  No change    Are you having other symptoms that might be associated with depression or anxiety? No    Have you had a significant life event? No     Do you have any concerns with your use of alcohol or other drugs? No    Social History     Tobacco Use     Smoking status: Former Smoker     Smokeless tobacco: Former User   Substance Use Topics     Alcohol use: No     Drug use: No     PHQ 4/20/2020   PHQ-9 Total Score 22   Q9: Thoughts of better off dead/self-harm past 2 weeks More than half the days     KARLY-7 SCORE 1/6/2014 2/11/2014 4/20/2020   Total Score 17 13 -   Total Score - - 16     PHQ-9: 24  KARLY-7: 21    Suicide Assessment Five-step Evaluation and Treatment (SAFE-T)      How many servings of fruits and vegetables do you eat daily?  0-1    On average, how many sweetened beverages do you drink each day (Examples: soda, juice, sweet tea, etc.  Do NOT count diet or artificially sweetened beverages)?   4    How many days per week do you exercise enough to make your heart beat faster? 3 or less    How many minutes a day do you exercise enough to make your heart beat faster? 9 or less  How many days per week do you miss taking your medication? 1    What makes it hard for you to take your medications?  remembering to take    History of moderate persistent asthma, was seen on 7-7-2020 via a virtual visit and diagnosed with an acute exacerbation.  History of inhaler nonadherence.  She was started on air duo, Singulair and given a burst of prednisone.  Admits to difficulty remembering to use the air duo, sometimes just use once, does not find it helpful.  She is using the Singulair daily, does find that helpful.  She also  "found her symptoms improved while on prednisone and became worse after coming off the prednisone.  She brought a journal which states she has worse shortness of breath during several occasions.  Once when she was with her 2x horses at the barn, wich triggered her asthma.  She also had another attack after smoking poor quality marijuana, subsequently used 5 vials of duo nebs which caused her heart rate to increase but she did feel her lungs were able to take an air better.  Currently has associated symptoms of feeling heat in her chest, and sometimes tightness in her lower chest that occurs after trying to exercise.     Also has history of severe major depression and generalized anxiety disorder, recently started on Lexapro 1 month ago.  Finds medication somewhat helpful.  Continues to intermittently having panic attacks, difficulty with sleep and racing thoughts.       Reviewed and updated as needed this visit by Provider         Review of Systems   Respiratory: Positive for chest tightness and shortness of breath.    Psychiatric/Behavioral: Positive for decreased concentration and sleep disturbance. Negative for mood changes, self-injury and suicidal ideas. The patient is nervous/anxious.       Past Medical History:   Diagnosis Date     Anxiety and depression      Shortness of breath          Social History     Tobacco Use     Smoking status: Former Smoker     Smokeless tobacco: Former User   Substance Use Topics     Alcohol use: No           Objective    /80 (BP Location: Right arm, Patient Position: Chair, Cuff Size: Adult Regular)   Pulse 83   Temp 97.7  F (36.5  C) (Oral)   Resp 18   Ht 1.676 m (5' 6\")   Wt 83 kg (183 lb)   SpO2 97%   BMI 29.54 kg/m    Body mass index is 29.54 kg/m .  Physical Exam  Constitutional:       Appearance: She is not ill-appearing.   Cardiovascular:      Rate and Rhythm: Normal rate and regular rhythm.   Pulmonary:      Effort: Pulmonary effort is normal. No respiratory " distress.      Breath sounds: Normal breath sounds.   Psychiatric:         Judgment: Judgment normal.      Comments: Appears very anxious.           Diagnostic Test Results:  Labs reviewed in Epic        Assessment & Plan     1. Severe episode of recurrent major depressive disorder, without psychotic features (H)  Severe symptoms.  Increase Lexapro from 10 mg to 20 mg daily.  Start outpatient counseling.  Safety precautions reviewed, go to emergency department if suicidal.  Otherwise close follow-up in 1 month for recheck.  - MENTAL HEALTH REFERRAL  - Adult; Outpatient Treatment; Individual/Couples/Family/Group Therapy/Health Psychology; [facility] [place] Located within [facility] [phone]; We will contact you to schedule the appointment or please call with any questions    2. Generalized anxiety disorder  Management as per above.  - MENTAL HEALTH REFERRAL  - Adult; Outpatient Treatment; Individual/Couples/Family/Group Therapy/Health Psychology; [facility] [place] [facility] within [facility] [phone]; We will contact you to schedule the appointment or please call with any questions    3. Shortness of breath  Suspected history of moderate persistent asthma.  Has inhaler nonadherence possibly due to underlying mental health disorder.  Needs diagnostic clarification, refer to asthma and allergy.  - ALLERGY/ASTHMA ADULT REFERRAL    4. Anxiety and depression  - escitalopram (LEXAPRO) 20 MG tablet; Take 0.5 tablets (10 mg) by mouth daily  Dispense: 30 tablet; Refill: 1    5. Insomnia due to other mental disorder  Start trazodone for concomitant treatment of insomnia and depression.  Recheck in 1 month.  - traZODone (DESYREL) 50 MG tablet; Take 1 tablet (50 mg) by mouth At Bedtime  Dispense: 30 tablet; Refill: 1         Depression Screening Follow Up    PHQ 7/20/2020   PHQ-9 Total Score 24   Q9: Thoughts of better off dead/self-harm past 2 weeks Nearly every day     Last PHQ-9 7/20/2020   1.  Little interest or pleasure in doing things  2   2.  Feeling down, depressed, or hopeless 3   3.  Trouble falling or staying asleep, or sleeping too much 3   4.  Feeling tired or having little energy 3   5.  Poor appetite or overeating 3   6.  Feeling bad about yourself 3   7.  Trouble concentrating 3   8.  Moving slowly or restless 1   Q9: Thoughts of better off dead/self-harm past 2 weeks 3   PHQ-9 Total Score 24   Difficulty at work, home, or with people Very difficult       Follow Up    Follow Up Actions Taken  Crisis resource information provided in the After Visit Summary  Mental Health Referral placed      Discussed the following ways the patient can remain in a safe environment:  dispose of old medications  and be around others      Return in about 1 month (around 8/20/2020) for anxiety and depression.    Vincent Mendoza MD  [facility]

## 2020-07-21 ASSESSMENT — ANXIETY QUESTIONNAIRES: GAD7 TOTAL SCORE: 21

## 2020-08-11 DIAGNOSIS — F41.9 ANXIETY AND DEPRESSION: ICD-10-CM

## 2020-08-11 DIAGNOSIS — F32.A ANXIETY AND DEPRESSION: ICD-10-CM

## 2020-08-11 RX ORDER — HYDROXYZINE HYDROCHLORIDE 10 MG/1
10 TABLET, FILM COATED ORAL 3 TIMES DAILY PRN
Qty: 30 TABLET | Refills: 1 | Status: SHIPPED | OUTPATIENT
Start: 2020-08-11 | End: 2020-08-17

## 2020-08-17 ENCOUNTER — OFFICE VISIT (OUTPATIENT)
Dept: FAMILY MEDICINE | Facility: CLINIC | Age: 25
End: 2020-08-17
Payer: COMMERCIAL

## 2020-08-17 ENCOUNTER — PATIENT OUTREACH (OUTPATIENT)
Dept: CARE COORDINATION | Facility: CLINIC | Age: 25
End: 2020-08-17

## 2020-08-17 VITALS
SYSTOLIC BLOOD PRESSURE: 117 MMHG | BODY MASS INDEX: 30.02 KG/M2 | DIASTOLIC BLOOD PRESSURE: 80 MMHG | TEMPERATURE: 98.2 F | WEIGHT: 186 LBS | OXYGEN SATURATION: 97 % | HEART RATE: 100 BPM

## 2020-08-17 DIAGNOSIS — F99 INSOMNIA DUE TO OTHER MENTAL DISORDER: ICD-10-CM

## 2020-08-17 DIAGNOSIS — F32.A ANXIETY AND DEPRESSION: ICD-10-CM

## 2020-08-17 DIAGNOSIS — F41.9 ANXIETY AND DEPRESSION: ICD-10-CM

## 2020-08-17 DIAGNOSIS — R06.02 SHORTNESS OF BREATH: ICD-10-CM

## 2020-08-17 DIAGNOSIS — F51.05 INSOMNIA DUE TO OTHER MENTAL DISORDER: ICD-10-CM

## 2020-08-17 PROCEDURE — 99213 OFFICE O/P EST LOW 20 MIN: CPT | Performed by: FAMILY MEDICINE

## 2020-08-17 RX ORDER — IPRATROPIUM BROMIDE AND ALBUTEROL SULFATE 2.5; .5 MG/3ML; MG/3ML
1 SOLUTION RESPIRATORY (INHALATION) EVERY 4 HOURS PRN
Qty: 1 BOX | Refills: 1 | Status: SHIPPED | OUTPATIENT
Start: 2020-08-17 | End: 2020-11-17

## 2020-08-17 RX ORDER — HYDROXYZINE HYDROCHLORIDE 25 MG/1
25 TABLET, FILM COATED ORAL 3 TIMES DAILY PRN
Qty: 90 TABLET | Refills: 0 | Status: SHIPPED | OUTPATIENT
Start: 2020-08-17 | End: 2020-11-17

## 2020-08-17 RX ORDER — ESCITALOPRAM OXALATE 20 MG/1
20 TABLET ORAL DAILY
Qty: 90 TABLET | Refills: 1 | Status: SHIPPED | OUTPATIENT
Start: 2020-08-17 | End: 2020-11-17

## 2020-08-17 RX ORDER — TRAZODONE HYDROCHLORIDE 50 MG/1
50 TABLET, FILM COATED ORAL AT BEDTIME
Qty: 90 TABLET | Refills: 1 | Status: SHIPPED | OUTPATIENT
Start: 2020-08-17 | End: 2020-11-17

## 2020-08-17 ASSESSMENT — PATIENT HEALTH QUESTIONNAIRE - PHQ9
SUM OF ALL RESPONSES TO PHQ QUESTIONS 1-9: 23
5. POOR APPETITE OR OVEREATING: MORE THAN HALF THE DAYS

## 2020-08-17 ASSESSMENT — ENCOUNTER SYMPTOMS
DECREASED CONCENTRATION: 1
NERVOUS/ANXIOUS: 1

## 2020-08-17 ASSESSMENT — ANXIETY QUESTIONNAIRES
6. BECOMING EASILY ANNOYED OR IRRITABLE: NEARLY EVERY DAY
IF YOU CHECKED OFF ANY PROBLEMS ON THIS QUESTIONNAIRE, HOW DIFFICULT HAVE THESE PROBLEMS MADE IT FOR YOU TO DO YOUR WORK, TAKE CARE OF THINGS AT HOME, OR GET ALONG WITH OTHER PEOPLE: SOMEWHAT DIFFICULT
3. WORRYING TOO MUCH ABOUT DIFFERENT THINGS: NEARLY EVERY DAY
GAD7 TOTAL SCORE: 17
5. BEING SO RESTLESS THAT IT IS HARD TO SIT STILL: NOT AT ALL
1. FEELING NERVOUS, ANXIOUS, OR ON EDGE: NEARLY EVERY DAY
2. NOT BEING ABLE TO STOP OR CONTROL WORRYING: NEARLY EVERY DAY
7. FEELING AFRAID AS IF SOMETHING AWFUL MIGHT HAPPEN: NEARLY EVERY DAY

## 2020-08-17 NOTE — PROGRESS NOTES
Subjective     Azul Rojas is a 24 year old female who presents to clinic today for the following health issues:    HPI   Medication Followup     Taking Medication as prescribed: yes    Side Effects:  None    Medication Helping Symptoms:  yes       KARLY-7 SCORE 2/11/2014 4/20/2020 7/20/2020   Total Score 13 - -   Total Score - 16 21       PHQ 4/20/2020 7/20/2020   PHQ-9 Total Score 22 24   Q9: Thoughts of better off dead/self-harm past 2 weeks More than half the days Nearly every day       Patient is a 24-year-old female with history of severe major depression and generalized anxiety disorder who is having interval follow-up.  Reports adherence and is currently on 10 mg Lexapro daily, 50 mg trazodone nightly and 10 mg of Atarax 3 times a day as needed for breakthrough symptoms anxiety.  She currently works at [employer] and was recently promoted.  He does find the medication helpful however voices that she continues to have severe symptoms.  Every other day continues to have panic attacks that are triggered by work-related stress and excessive worry about financial difficulty.  She does want a start outpatient counseling however is worried about the cost that may be potentially incurred with this.  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  She is requesting a refill of the medications and is tolerating it well.  The trazodone has been very helpful for getting her to sleep and being asleep.  She has occasional morning fogginess which resolves weekly.  Lexapro she take tolerates without any kind of reported side effects.  The Atarax, she takes it sometimes she does not find the medication very helpful.    In the interim, she is recently seen an allergist who said she had mild allergies and is currently feeling better while on Allegra.    She does have concerns for shortness of breath and wheezing.  She is requesting a refill on her inhalers.    Reviewed and updated as needed this visit by  Provider         Review of Systems   Psychiatric/Behavioral: Positive for decreased concentration and suicidal ideas (Chronic, without reported change.). The patient is nervous/anxious.              Objective    /80 (BP Location: Right arm, Patient Position: Chair, Cuff Size: Adult Regular)   Pulse 100   Temp 98.2  F (36.8  C) (Oral)   Wt 84.4 kg (186 lb)   SpO2 97%   BMI 30.02 kg/m    Body mass index is 30.02 kg/m .  Physical Exam  Constitutional:       Appearance: She is not ill-appearing.   Cardiovascular:      Rate and Rhythm: Normal rate and regular rhythm.   Pulmonary:      Effort: Pulmonary effort is normal.      Breath sounds: Normal breath sounds.   Psychiatric:      Comments: Appears anxious however her thought content and judgment appear normal.  He has a normal rate and volume without evidence of pressured speech.            Diagnostic Test Results:  Labs reviewed in Epic        Assessment & Plan     1. Anxiety and depression  Uncontrolled.  Increase Lexapro from 10 mg to 20 mg daily D.  Increase Atarax from 10 mg to 25 mg 3 times daily as needed for breakthrough anxiety.  Care coordination referral made due to financial hardship.  I did encourage patient to consider starting outpatient counseling however she is worried about the financial cost.  Interval recheck in 3 months.  - hydrOXYzine (ATARAX) 25 MG tablet; Take 1 tablet (25 mg) by mouth 3 times daily as needed for anxiety  Dispense: 90 tablet; Refill: 0  - CARE COORDINATION REFERRAL  - escitalopram (LEXAPRO) 20 MG tablet; Take 1 tablet (20 mg) by mouth daily  Dispense: 90 tablet; Refill: 1    2. Insomnia due to other mental disorder  Controlled, continue current medical management.  - traZODone (DESYREL) 50 MG tablet; Take 1 tablet (50 mg) by mouth At Bedtime  Dispense: 90 tablet; Refill: 1    3. Shortness of breath  Refill.  - ipratropium - albuterol 0.5 mg/2.5 mg/3 mL (DUONEB) 0.5-2.5 (3) MG/3ML neb solution; Take 1 vial (3 mLs) by  nebulization every 4 hours as needed for shortness of breath / dyspnea or wheezing  Dispense: 1 Box; Refill: 1       Depression Screening Follow Up    PHQ 8/17/2020   PHQ-9 Total Score 23   Q9: Thoughts of better off dead/self-harm past 2 weeks Nearly every day     Discussed the following ways the patient can remain in a safe environment:  be around others    Return in about 3 months (around 11/17/2020) for mental health recheck.    Vincent Mendoza MD  [facility]

## 2020-08-17 NOTE — PROGRESS NOTES
Clinic Care Coordination Contact  [facility]/Voicemail    Referral Source: PCP  Clinical Data: Care Coordinator Outreach  Outreach attempted x 1.  Left message on patient's voicemail with call back information and requested return call.  Plan: Care Coordinator will send care coordination introduction letter with care coordinator contact information and explanation of care coordination services via mail. Care Coordinator will try to reach patient again in 1-2 business days.    RONN Flynn   Care Coordination Team  [phone]

## 2020-08-18 ASSESSMENT — ANXIETY QUESTIONNAIRES: GAD7 TOTAL SCORE: 17

## 2020-08-19 ENCOUNTER — PATIENT OUTREACH (OUTPATIENT)
Dept: CARE COORDINATION | Facility: CLINIC | Age: 25
End: 2020-08-19

## 2020-08-19 NOTE — LETTER
[facility] CARE COORDINATION  [facility]  August 19, 2020    Azul Crystal  [id number]  [facility] [address]      Dear Azul,    I am a clinic care coordinator who works with Vincent Mendoza MD at [facility]. I wanted to introduce myself and provide you with my contact information for you to be able to call me with any questions or concerns. Below is a description of clinic care coordination and how I can further assist you.      The clinic care coordination team is made up of a registered nurse,  and community health worker who understand the health care system. The goal of clinic care coordination is to help you manage your health and improve access to the health care system in the most efficient manner. The team can assist you in meeting your health care goals by providing education, coordinating services, strengthening the communication among your providers and supporting you with any resource needs.    Please feel free to contact me at [phone] with any questions or concerns. We are focused on providing you with the highest-quality healthcare experience possible and that all starts with you.     Sincerely,     RONN Flynn   Care Coordination Team  [phone]

## 2020-09-10 ENCOUNTER — PATIENT OUTREACH (OUTPATIENT)
Dept: CARE COORDINATION | Facility: CLINIC | Age: 25
End: 2020-09-10

## 2020-09-10 NOTE — PROGRESS NOTES
Clinic Care Coordination Contact  [facility]/Voicemail    Referral Source: PCP  Clinical Data: Care Coordinator Outreach  Outreach attempted x 3.  Left message on patient's voicemail with call back information and requested return call.  Plan: Care Coordinator sent care coordination introduction letter on 8/19/20 via minicabit. Care Coordinator will do no further outreaches at this time.    RONN Flynn   Care Coordination Team  [phone]       Managing Morning Sickness: Care Instructions  Your Care Instructions    For many women, the toughest part of early pregnancy is morning sickness. Morning sickness can range from mild nausea to severe nausea with bouts of vomiting. Symptoms may be worse in the morning, although they can strike at any time of the day or night. If you have nausea, vomiting, or both, look for safe measures that can bring you relief. You can take simple steps at home to manage morning sickness. These steps include changing what and when you eat and avoiding certain foods and smells. Some women find that acupuncture and acupressure wristbands also help. Follow-up care is a key part of your treatment and safety. Be sure to make and go to all appointments, and call your doctor if you are having problems. It's also a good idea to know your test results and keep a list of the medicines you take. How can you care for yourself at home? · Keep food in your stomach, but not too much at once. Your nausea may be worse if your stomach is empty. Eat five or six small meals a day instead of three large meals. · For morning nausea, eat a small snack, such as a couple of crackers or dry biscuits, before rising. Allow a few minutes for your stomach to settle before you get out of bed slowly. · Drink plenty of fluids, enough so that your urine is light yellow or clear like water. If you have kidney, heart, or liver disease and have to limit fluids, talk with your doctor before you increase the amount of fluids you drink. Some women find that peppermint tea helps with nausea. · Eat more protein, such as chicken, fish, lean meat, beans, nuts, and seeds. · Eat carbohydrate foods, such as potatoes, whole-grain cereals, rice, and pasta. · Avoid smells and foods that make you feel nauseated. Spicy or high-fat foods, citrus juice, milk, coffee, and tea with caffeine often make nausea worse. · Do not drink alcohol. · Do not smoke.  Try not to be around others who smoke. If you need help quitting, talk to your doctor about stop-smoking programs and medicines. These can increase your chances of quitting for good. · If you are taking iron supplements, ask your doctor if they are necessary. Iron can make nausea worse. · Get lots of rest. Stress and fatigue can make your morning sickness worse. · Ask your doctor about taking prescription medicine, or over-the-counter products such as vitamin B6, doxylamine, or rhoda, to relieve your symptoms. Your doctor can tell you the doses that are safe for you. · Take your prenatal vitamins at night on a full stomach. When should you call for help? Call 911 anytime you think you may need emergency care. For example, call if:  ? · You passed out (lost consciousness). ?Call your doctor now or seek immediate medical care if:  ? · You are sick to your stomach or cannot drink fluids. ? · You have symptoms of dehydration, such as:  ¨ Dry eyes and a dry mouth. ¨ Passing only a little urine. ¨ Feeling thirstier than usual.   ? · You are not able to keep down your medicine. ? · You have pain in your belly or pelvis. ? Watch closely for changes in your health, and be sure to contact your doctor if:  ? · You do not get better as expected. Where can you learn more? Go to http://rl-lavon.info/. Enter P702 in the search box to learn more about \"Managing Morning Sickness: Care Instructions. \"  Current as of: March 16, 2017  Content Version: 11.4  © 1285-4788 Healthwise, [employer]. Care instructions adapted under license by [employer] (which disclaims liability or warranty for this information). If you have questions about a medical condition or this instruction, always ask your healthcare professional. [place] [address] any warranty or liability for your use of this information.        Urinary Tract Infection in Pregnancy: Care Instructions  Your Care Instructions    A urinary tract infection, or UTI, is an infection of the bladder and other urinary structures. Most UTIs occur in the bladder. They often cause pain or burning when you urinate. UTI is the most common bacterial infection in pregnancy. If untreated, a UTI could lead to problems such as a kidney infection or  labor. Most UTIs can be cured with antibiotics. Your doctor will prescribe an antibiotic that is safe during pregnancy. Be sure to finish your medicine so that the infection does not spread to your kidneys. Follow-up care is a key part of your treatment and safety. Be sure to make and go to all appointments, and call your doctor if you are having problems. It's also a good idea to know your test results and keep a list of the medicines you take. How can you care for yourself at home? · Take your antibiotics as directed. Do not stop taking them just because you feel better. You need to take the full course of antibiotics. · Drink extra water and other fluids for the next day or two. This will help wash out the bacteria causing the infection. If you have kidney, heart, or liver disease and have to limit fluids, talk with your doctor before you increase the amount of fluids you drink. · Do not drink alcohol. · Urinate often. Try to empty your bladder each time. Preventing UTIs  · Drink plenty of fluids, enough so that your urine is light yellow or clear like water. This helps you urinate often, which clears bacteria from your system. If you have kidney, heart, or liver disease and have to limit fluids, talk with your doctor before you increase the amount of fluids you drink. · Urinate when you first have the urge. · Urinate right after you have sex. This is the best way for women to avoid UTIs. · When going to the bathroom, wipe from front to back to keep bacteria from entering the vagina or urethra. When should you call for help?   Call your doctor now or seek immediate medical care if:  ? · You have symptoms of a worse urinary tract infection. These may include:  ¨ Pain or burning when you urinate. ¨ A frequent need to urinate without being able to pass much urine. ¨ Pain in the flank, which is just below the rib cage and above the waist on either side of the back. ¨ Blood in your urine. ¨ A fever. ? Watch closely for changes in your health, and be sure to contact your doctor if:  ? · You do not get better as expected. Where can you learn more? Go to http://rl-lavon.info/. Enter M982 in the search box to learn more about \"Urinary Tract Infection in Pregnancy: Care Instructions. \"  Current as of: March 16, 2017  Content Version: 11.4  © 1898-0425 Healthwise, [employer]. Care instructions adapted under license by [employer] (which disclaims liability or warranty for this information). If you have questions about a medical condition or this instruction, always ask your healthcare professional. [place] [address] any warranty or liability for your use of this information.

## 2020-11-13 ENCOUNTER — HOSPITAL ENCOUNTER (EMERGENCY)
Facility: CLINIC | Age: 25
Discharge: HOME | End: 2020-11-13
Attending: PHYSICIAN ASSISTANT | Admitting: PHYSICIAN ASSISTANT
Payer: COMMERCIAL

## 2020-11-13 ENCOUNTER — NURSE TRIAGE (OUTPATIENT)
Dept: NURSING | Facility: CLINIC | Age: 25
End: 2020-11-13

## 2020-11-13 VITALS
RESPIRATION RATE: 18 BRPM | TEMPERATURE: 98.7 F | BODY MASS INDEX: 30.39 KG/M2 | WEIGHT: 188.27 LBS | HEART RATE: 108 BPM | SYSTOLIC BLOOD PRESSURE: 133 MMHG | DIASTOLIC BLOOD PRESSURE: 81 MMHG | OXYGEN SATURATION: 100 %

## 2020-11-13 DIAGNOSIS — Z20.822 PERSON UNDER INVESTIGATION FOR COVID-19: ICD-10-CM

## 2020-11-13 DIAGNOSIS — S06.0X0A CONCUSSION WITHOUT LOSS OF CONSCIOUSNESS, INITIAL ENCOUNTER: ICD-10-CM

## 2020-11-13 PROCEDURE — U0003 INFECTIOUS AGENT DETECTION BY NUCLEIC ACID (DNA OR RNA); SEVERE ACUTE RESPIRATORY SYNDROME CORONAVIRUS 2 (SARS-COV-2) (CORONAVIRUS DISEASE [COVID-19]), AMPLIFIED PROBE TECHNIQUE, MAKING USE OF HIGH THROUGHPUT TECHNOLOGIES AS DESCRIBED BY CMS-2020-01-R: HCPCS | Performed by: PHYSICIAN ASSISTANT

## 2020-11-13 PROCEDURE — C9803 HOPD COVID-19 SPEC COLLECT: HCPCS

## 2020-11-13 PROCEDURE — 99283 EMERGENCY DEPT VISIT LOW MDM: CPT

## 2020-11-13 ASSESSMENT — ENCOUNTER SYMPTOMS
CHILLS: 0
FEVER: 0
COUGH: 0
DIARRHEA: 0
VOMITING: 0
SEIZURES: 0
NAUSEA: 1
SHORTNESS OF BREATH: 1
HEADACHES: 1
SPEECH DIFFICULTY: 0

## 2020-11-13 NOTE — DISCHARGE INSTRUCTIONS
*No sports or activities that put you at risk for a repeat head injury until you have been without symptoms for 1 week.   *Tylenol or motrin as directed as needed for pain.  *Follow-up with your doctor or concussion for a recheck in 2-3 days.  *Return if you develop worsening headache, recurrent vomiting, seizures, neurologic changes or become worse in any way.         Discharge Instructions  Concussion    You were seen today for signs of a concussion.  The symptoms will vary, depending on the nature of your injury and your health. You may have: headache, confusion, nausea (feel sick to your stomach), vomiting (throwing up) and problems with memory, concentrating, or sleep. You may feel dizzy, irritable, and tired. Children and teens may need help from their parents, teachers, and coaches to watch for symptoms as they recover.    Generally, every Emergency Department visit should have a follow-up clinic visit with either a primary or a specialty clinic/provider. Please follow-up as instructed by your emergency provider today.     Return to the Emergency Department if:  Your headache gets worse or you start to have a really bad headache even with the recommended treatment plan.   You feel drowsier, have growing confusion, or slurred speech.   You keep repeating yourself.   You have strange behavior or are feeling more irritable.   You have a seizure.   You vomit (throw up) more than once.   You have trouble walking.   You have weakness or numbness.  Your neck pain gets worse.   You have a loss of consciousness.   You have blood for fluid coming from your ears or nose.   You have new symptoms or anything that worries you.     Home Care:  Get lots of rest and get enough sleep at night. Take daytime naps or rest if you feel tired.   Limit physical activity and  thinking  activities. These can make symptoms worse.   Physical activities include gym, sports, weight training, running, exercise, and heavy lifting.   Thinking  activities include homework, class work, job-related work, and screen time (phone, computer, tablet, TV, and video games).   Stick to a healthy diet and drink lots of fluids. Avoid alcohol.  As symptoms improve, you may slowly return to your daily activities. If symptoms get worse or return, reduce your activity.   Know that it is normal to feel sad or frustrated when you do not feel right and are less active.     Going Back to Work:  Your care team will tell you when you are ready to return to work.    Limit the amount of work you do soon after your injury. This may speed healing. Take breaks if your symptoms get worse. You should also reduce your physical activity as well as activities that require a lot of thinking until you see your doctor. You may need shorter work days and a lighter workload.  Avoid heavy lifting, working with machinery, driving and working at heights until your symptoms are gone or you are cleared by a provider.    Going Back to School:  If you are still having symptoms, you may need extra help at school.  Tell your teachers and school nurse about your injury and symptoms. Ask them to watch for problems with learning, memory, and concentrating. Symptoms may get worse when you do schoolwork, and you may become more irritable. You may need shorter school days, a reduced workload, and to postpone testing.  Do not drive or take gym class (physical activity) until cleared by a provider.    Returning to Sports:  Never return to play if you have any symptoms. A full recovery will reduce the chances of getting hurt again. Remember, it is better to miss one or two games than a whole season.  You should rest from all physical activity until you see your provider. Generally, if all symptoms have completely cleared, your provider can help guide you to slowly return to sports. If symptoms return or worsen, stop the activity and see your provider.  Important: If you are in an organized sport and under age  18, you will need written consent from a healthcare provider before you return to sports. Typically, this will be your primary care or sports medicine provider. Please make an appointment.    If you were given a prescription for medicine here today, be sure to read all of the information (including the package insert) that comes with your prescription.  This will include important information about the medicine, its side effects, and any warnings that you need to know about.  The pharmacist who fills the prescription can provide more information and answer questions you may have about the medicine.  If you have questions or concerns that the pharmacist cannot address, please call or return to the Emergency Department.     Remember that you can always come back to the Emergency Department if you are not able to see your regular provider in the amount of time listed above, if you get any new symptoms, or if there is anything that worries you.

## 2020-11-13 NOTE — LETTER
November 13, 2020      To Whom It May Concern:      Azul Rojas was seen in our Emergency Department today, 11/13/20.  I expect her condition to improve over the next 2-3 days.  She may return to work when improved.    Sincerely,        Loraine Loomis PA-C

## 2020-11-13 NOTE — ED TRIAGE NOTES
"Yesterday pt \"hit my head against the wall because I was mad and now I think I have a concussion.\" pt also states she thins she's having trouble breathing since then. Denies SOB, cough, fever, shaking, etc. Pt says it feels like \"I forget to breathe.\"   "

## 2020-11-13 NOTE — PROGRESS NOTES
Pre-Visit Planning - phone visit  11/17/2020  10:00am  Dr. Mendoza    Appointment Notes for this encounter:   phone visit med check     Questionnaires Reviewed/Assigned  No additional questionnaires are needed        Patient preferred phone number: [phone]    Unable to reach. Left voicemail. Advised patient to call clinic back at [phone].

## 2020-11-13 NOTE — ED AVS SNAPSHOT
[facility]  [address]  [facility] [address]  Phone: [phone]  Fax: [phone]                                    Azul Rojas   MRN: [id number]    Department: [facility] Emergency [facility]   Date of Visit: 11/13/2020           After Visit Summary Signature Page    I have received my discharge instructions, and my questions have been answered. I have discussed any challenges I see with this plan with the nurse or doctor.    ..........................................................................................................................................  Patient/Patient Representative Signature      ..........................................................................................................................................  Patient Representative Print Name and Relationship to Patient    ..................................................               ................................................  Date                                   Time    ..........................................................................................................................................  Reviewed by Signature/Title    ...................................................              ..............................................  Date                                               Time          22EPIC Rev 08/18

## 2020-11-13 NOTE — TELEPHONE ENCOUNTER
Pt is calling.    Possible concussion yesterday. She was very mad, and hit her head on the wall. Back of her head in the middle. No nausea or vomiting yesterday. No LOC. No scrape or bump felt. Now today, she is complaining of nausea, dizziness. Also, complaining of shortness of breath. Unable to catch her breath. She believes that she may be forgetting to breath. Feels confused at times. I advised her to come into the ED for evaluation. Have someone drive you now.  She verbalized understanding.    Reason for Disposition    [1] ACUTE NEURO SYMPTOM AND [2] now fine  (DEFINITION: difficult to awaken OR confused thinking and talking OR slurred speech OR weakness of arms OR unsteady walking)    Additional Information    Negative: [1] ACUTE NEURO SYMPTOM AND [2] present now  (DEFINITION: difficult to awaken OR confused thinking and talking OR slurred speech OR weakness of arms OR unsteady walking)    Negative: Knocked out (unconscious) > 1 minute    Negative: Seizure (convulsion) occurred  (Exception: prior history of seizures and now alert and without Acute Neuro Symptoms)    Negative: Penetrating head injury (e.g., knife, gun shot wound, metal object)    Negative: [1] Major bleeding (e.g., actively dripping or spurting) AND [2] can't be stopped    Negative: [1] Dangerous mechanism of injury (e.g., MVA, diving, trampoline, contact sports, fall > 10 feet or 3 meters) AND [2] NECK pain AND [3] began < 1 hour after injury    Negative: Sounds like a life-threatening emergency to the triager    Negative: [1] Recently examined and diagnosed with a concussion by a healthcare provider AND [2] questions about concussion symptoms    Negative: Can't remember what happened (amnesia)    Negative: Vomiting once or more    Negative: [1] Loss of vision or double vision AND [2] present now    Negative: Watery or blood-tinged fluid dripping from the NOSE or EARS now  (Exception: tears from crying or nosebleed from nasal trauma)     "Negative: One or two \"black eyes\" (bruising, purple color of eyelids)    Negative: Large swelling or bruise > 2 inches (5 cm)    Negative: Skin is split open or gaping  (or length > 1/2 inch or 12 mm)    Negative: [1] Bleeding AND [2] won't stop after 10 minutes of direct pressure (using correct technique)    Negative: Sounds like a serious injury to the triager    Protocols used: HEAD INJURY-A-    Fartun Pedraza RN  [facility] Nurse Advisor  11/13/2020 at 4:52 PM      "

## 2020-11-14 LAB
SARS-COV-2 RNA SPEC QL NAA+PROBE: NOT DETECTED
SPECIMEN SOURCE: NORMAL

## 2020-11-14 NOTE — ED PROVIDER NOTES
History     Chief Complaint:  Head Injury       HPI   Azul Rojas is a 25 year old female who presents with head injury.  The patient reports yesterday afternoon she became upset and slammed her head backwards striking her in the back of her head on the wall.  She denies loss of consciousness.  Since the incident, she reports intermittent nausea, frontal headache, and difficulty concentrating.  She denies severe headache, vomiting, or vision changes.  She does note a history of asthma with associated shortness of breath which she has been told is due to her cannabis abuse and notes since the incident, increased difficulty catching her breath.  She denies chest pain, fever/chills, cough, Covid exposure..      Allergies:  No Known Drug Allergies     Medications:    Albuterol   Lexapro   Fluticasone-salmeterol  Hydroxyzine  Duoneb   Trazodone     Past Medical History:    Anxiety and depression  shortness of breath   GERD  Cannabis Abus    Past Surgical History:    No past surgical history on file.     Family History:    family history includes Diabetes in her maternal grandfather and maternal grandmother; Family History Negative in her mother.     Social History:   reports that she has quit smoking. She has quit using smokeless tobacco. She reports that she does not drink alcohol or use drugs.    PCP: Vinecnt Mendoza     Review of Systems   Constitutional: Negative for chills and fever.   Respiratory: Positive for shortness of breath. Negative for cough.    Gastrointestinal: Positive for nausea. Negative for diarrhea and vomiting.   Neurological: Positive for headaches. Negative for seizures, syncope and speech difficulty.   All other systems reviewed and are negative.    Physical Exam     Patient Vitals for the past 24 hrs:   BP Temp Temp src Pulse Resp SpO2 Weight   11/13/20 1729 133/81 98.7  F (37.1  C) Temporal 108 18 100 % 85.4 kg (188 lb 4.4 oz)        General: Alert, interactive.  Head:  Scalp is  atraumatic.  Eyes:  EOM intact. The pupils are equal, round, and reactive to light. No scleral icterus.  ENT:                                      Ears:  The external ears are normal.  Nose:  The external nose is normal.  Throat:  The oropharynx is normal. Mucus membranes are moist.                 Neck:  Normal range of motion. There is no rigidity.   CV:  Regular rate and rhythm. No murmur. 2+ radial pulses  Resp:  Breath sounds are clear bilaterally. Non-labored, no retractions or accessory muscle use.  GI:  Abdomen is soft, no distension, no tenderness.   MS:  Normal range of motion.   Skin:  Warm and dry.   Neuro:  Cranial nerves 2-12 intact; 5/5 strength throughout the upper and lower extremities; sensation intact to light touch throughout the upper and lower extremities;  2+ DTRs to the bilateral upper and lower extremities (biceps, BRs, patellar, achilles); normal finger to nose; normal gait, No meningismus   Psych:  Awake. Alert & orientedx3.  Appropriate interactions.        Emergency Department Course   Laboratory:  Covid PCR: pending     Emergency Department Course:  Past medical records, nursing notes, and vitals reviewed.    I performed an exam of the patient and obtained history, as documented above.    Findings and plan explained to the patient. Patient discharged home with instructions regarding supportive care, medications, and reasons to return. The importance of close follow-up was reviewed.        Impression & Plan      Medical Decision Making:  Azul Rojas is a 25 year old female who presents for evaluation after head injury yesterday. Patient reports she became upset yesterday and slammed her head backwards striking her head on the wall. This patient has a history and clinical exam consistent with concussion.  The differential diagnosis includes skull fracture, epidural hematoma, subdural hematoma, intracerebral hemorrhage, and traumatic subarachnoid hemorrhage; all of these are highly  unlikely in this clinical setting.  By the [place] [facility] CT rules they do not warrant head ct imaging and discussed risk/benefit ratio with patient detail.     Return to ED for red flags (change in behavior, drowsiness, seizures, vomiting, etc) and gave concussion precautions for home.  I did stress importance of avoiding a second concussion while brain heals.  Plan for close follow-up with primary care provider and/or concussion clinic if symptoms persist.  Work note provided for the next 2-3 days.    Of note, patient noted ongoing shortness of breath, similar to past presentations.  She is was told she has asthma from cannabis use and is followed up with asthma clinic in the past.  Considered cardiopulmonary emergency including pulmonary embolism, but she has no pleuritic chest pain, hypoxia, and additionally she had a CT chest PE in March of this year that was negative for pulmonary embolism.  I do not believe further work-up is indicated.  Lungs clear to auscultation without wheezing.  She has no fever/chills to suggest pneumonia.  COVID-19 less likely given the chronicity of symptoms, though will swab given the presents in the community.  Patient has albuterol at home and I recommended continuing to use this.  She understands reasons to return and all questions and concerns addressed prior to discharge home.      Diagnosis:    ICD-10-CM    1. Concussion without loss of consciousness, initial encounter  S06.0X0A CONCUSSION  REFERRAL     Symptomatic COVID-19 Virus (Coronavirus) by PCR   2. Person under investigation for COVID-19  Z20.828         Disposition:   Discharge home        11/13/2020   Loraine Eason PA-C, PA-C  11/13/20 1817

## 2020-11-15 DIAGNOSIS — Z30.41 ORAL CONTRACEPTIVE USE: ICD-10-CM

## 2020-11-16 RX ORDER — NORETHINDRONE ACETATE AND ETHINYL ESTRADIOL 1MG-20(21)
1 KIT ORAL DAILY
Qty: 28 TABLET | Refills: 0 | Status: SHIPPED | OUTPATIENT
Start: 2020-11-16 | End: 2021-01-20

## 2020-11-16 NOTE — TELEPHONE ENCOUNTER
Prescription approved per [facility] Refill Protocol.  One month given as patient to see provider tomorrow.     Cecilia Dorman RN Flex

## 2020-11-17 ENCOUNTER — VIRTUAL VISIT (OUTPATIENT)
Dept: FAMILY MEDICINE | Facility: CLINIC | Age: 25
End: 2020-11-17
Payer: COMMERCIAL

## 2020-11-17 DIAGNOSIS — F51.05 INSOMNIA DUE TO OTHER MENTAL DISORDER: ICD-10-CM

## 2020-11-17 DIAGNOSIS — R06.02 SHORTNESS OF BREATH: ICD-10-CM

## 2020-11-17 DIAGNOSIS — R05.9 COUGH: ICD-10-CM

## 2020-11-17 DIAGNOSIS — F99 INSOMNIA DUE TO OTHER MENTAL DISORDER: ICD-10-CM

## 2020-11-17 DIAGNOSIS — Z11.59 NEED FOR HEPATITIS C SCREENING TEST: ICD-10-CM

## 2020-11-17 DIAGNOSIS — F32.A ANXIETY AND DEPRESSION: Primary | ICD-10-CM

## 2020-11-17 DIAGNOSIS — Z11.4 SCREENING FOR HIV (HUMAN IMMUNODEFICIENCY VIRUS): ICD-10-CM

## 2020-11-17 DIAGNOSIS — J45.40 MODERATE PERSISTENT ASTHMA WITHOUT COMPLICATION: ICD-10-CM

## 2020-11-17 DIAGNOSIS — Z11.3 SCREENING FOR STDS (SEXUALLY TRANSMITTED DISEASES): ICD-10-CM

## 2020-11-17 DIAGNOSIS — S06.0X0D CONCUSSION WITHOUT LOSS OF CONSCIOUSNESS, SUBSEQUENT ENCOUNTER: ICD-10-CM

## 2020-11-17 DIAGNOSIS — F41.9 ANXIETY AND DEPRESSION: Primary | ICD-10-CM

## 2020-11-17 PROCEDURE — 96127 BRIEF EMOTIONAL/BEHAV ASSMT: CPT | Mod: 95 | Performed by: FAMILY MEDICINE

## 2020-11-17 PROCEDURE — 99214 OFFICE O/P EST MOD 30 MIN: CPT | Mod: 95 | Performed by: FAMILY MEDICINE

## 2020-11-17 RX ORDER — ALBUTEROL SULFATE 90 UG/1
2 AEROSOL, METERED RESPIRATORY (INHALATION) EVERY 4 HOURS PRN
Qty: 1 INHALER | Refills: 11 | Status: SHIPPED | OUTPATIENT
Start: 2020-11-17 | End: 2021-01-15

## 2020-11-17 RX ORDER — BENZONATATE 100 MG/1
100 CAPSULE ORAL 3 TIMES DAILY PRN
Qty: 30 CAPSULE | Refills: 0 | Status: SHIPPED | OUTPATIENT
Start: 2020-11-17 | End: 2021-01-15

## 2020-11-17 RX ORDER — FLUTICASONE PROPIONATE AND SALMETEROL 113; 14 UG/1; UG/1
1 POWDER, METERED RESPIRATORY (INHALATION) 2 TIMES DAILY
Qty: 1 INHALER | Refills: 11 | Status: SHIPPED | OUTPATIENT
Start: 2020-11-17 | End: 2021-01-15

## 2020-11-17 RX ORDER — HYDROXYZINE HYDROCHLORIDE 25 MG/1
25 TABLET, FILM COATED ORAL 3 TIMES DAILY PRN
Qty: 90 TABLET | Refills: 3 | Status: SHIPPED | OUTPATIENT
Start: 2020-11-17 | End: 2021-01-15

## 2020-11-17 RX ORDER — IPRATROPIUM BROMIDE AND ALBUTEROL SULFATE 2.5; .5 MG/3ML; MG/3ML
1 SOLUTION RESPIRATORY (INHALATION) EVERY 4 HOURS PRN
Qty: 1 BOX | Refills: 11 | Status: SHIPPED | OUTPATIENT
Start: 2020-11-17 | End: 2021-04-13

## 2020-11-17 RX ORDER — TRAZODONE HYDROCHLORIDE 50 MG/1
50 TABLET, FILM COATED ORAL AT BEDTIME
Qty: 90 TABLET | Refills: 1 | Status: SHIPPED | OUTPATIENT
Start: 2020-11-17 | End: 2021-01-15

## 2020-11-17 ASSESSMENT — ANXIETY QUESTIONNAIRES
7. FEELING AFRAID AS IF SOMETHING AWFUL MIGHT HAPPEN: NEARLY EVERY DAY
1. FEELING NERVOUS, ANXIOUS, OR ON EDGE: NEARLY EVERY DAY
2. NOT BEING ABLE TO STOP OR CONTROL WORRYING: NEARLY EVERY DAY
5. BEING SO RESTLESS THAT IT IS HARD TO SIT STILL: SEVERAL DAYS
GAD7 TOTAL SCORE: 16
IF YOU CHECKED OFF ANY PROBLEMS ON THIS QUESTIONNAIRE, HOW DIFFICULT HAVE THESE PROBLEMS MADE IT FOR YOU TO DO YOUR WORK, TAKE CARE OF THINGS AT HOME, OR GET ALONG WITH OTHER PEOPLE: SOMEWHAT DIFFICULT
3. WORRYING TOO MUCH ABOUT DIFFERENT THINGS: NEARLY EVERY DAY
6. BECOMING EASILY ANNOYED OR IRRITABLE: SEVERAL DAYS

## 2020-11-17 ASSESSMENT — ENCOUNTER SYMPTOMS
COUGH: 1
FEVER: 0
DECREASED CONCENTRATION: 1
HEADACHES: 0
SHORTNESS OF BREATH: 0
NERVOUS/ANXIOUS: 1

## 2020-11-17 ASSESSMENT — PATIENT HEALTH QUESTIONNAIRE - PHQ9
5. POOR APPETITE OR OVEREATING: MORE THAN HALF THE DAYS
SUM OF ALL RESPONSES TO PHQ QUESTIONS 1-9: 20
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
## 2020-11-18 ENCOUNTER — ALLIED HEALTH/NURSE VISIT (OUTPATIENT)
Dept: FAMILY MEDICINE | Facility: CLINIC | Age: 25
End: 2020-11-18
Payer: COMMERCIAL

## 2020-11-18 DIAGNOSIS — Z23 NEED FOR PROPHYLACTIC VACCINATION AND INOCULATION AGAINST INFLUENZA: Primary | ICD-10-CM

## 2020-11-18 PROCEDURE — 90471 IMMUNIZATION ADMIN: CPT

## 2020-11-18 PROCEDURE — 90686 IIV4 VACC NO PRSV 0.5 ML IM: CPT

## 2020-11-18 PROCEDURE — 90651 9VHPV VACCINE 2/3 DOSE IM: CPT

## 2020-11-18 PROCEDURE — 90472 IMMUNIZATION ADMIN EACH ADD: CPT

## 2020-11-18 PROCEDURE — 99207 PR NO CHARGE NURSE ONLY: CPT

## 2020-11-18 ASSESSMENT — ANXIETY QUESTIONNAIRES: GAD7 TOTAL SCORE: 16

## 2021-01-15 ENCOUNTER — OFFICE VISIT (OUTPATIENT)
Dept: FAMILY MEDICINE | Facility: CLINIC | Age: 26
End: 2021-01-15
Payer: COMMERCIAL

## 2021-01-15 VITALS
TEMPERATURE: 98.1 F | SYSTOLIC BLOOD PRESSURE: 114 MMHG | BODY MASS INDEX: 30.81 KG/M2 | HEART RATE: 86 BPM | RESPIRATION RATE: 16 BRPM | OXYGEN SATURATION: 97 % | WEIGHT: 190.9 LBS | DIASTOLIC BLOOD PRESSURE: 64 MMHG

## 2021-01-15 DIAGNOSIS — F41.9 ANXIETY AND DEPRESSION: ICD-10-CM

## 2021-01-15 DIAGNOSIS — F99 INSOMNIA DUE TO OTHER MENTAL DISORDER: ICD-10-CM

## 2021-01-15 DIAGNOSIS — F32.A ANXIETY AND DEPRESSION: ICD-10-CM

## 2021-01-15 DIAGNOSIS — F51.05 INSOMNIA DUE TO OTHER MENTAL DISORDER: ICD-10-CM

## 2021-01-15 DIAGNOSIS — J45.40 MODERATE PERSISTENT ASTHMA WITHOUT COMPLICATION: Primary | ICD-10-CM

## 2021-01-15 DIAGNOSIS — Z60.9 HIGH RISK SOCIAL SITUATION: ICD-10-CM

## 2021-01-15 PROCEDURE — 96127 BRIEF EMOTIONAL/BEHAV ASSMT: CPT | Performed by: FAMILY MEDICINE

## 2021-01-15 PROCEDURE — 99214 OFFICE O/P EST MOD 30 MIN: CPT | Performed by: FAMILY MEDICINE

## 2021-01-15 RX ORDER — ALBUTEROL SULFATE 90 UG/1
2 AEROSOL, METERED RESPIRATORY (INHALATION) EVERY 4 HOURS PRN
Qty: 1 INHALER | Refills: 11 | Status: SHIPPED | OUTPATIENT
Start: 2021-01-15 | End: 2021-04-28

## 2021-01-15 RX ORDER — FLUTICASONE PROPIONATE AND SALMETEROL 113; 14 UG/1; UG/1
1 POWDER, METERED RESPIRATORY (INHALATION) 2 TIMES DAILY
Qty: 1 INHALER | Refills: 11 | Status: SHIPPED | OUTPATIENT
Start: 2021-01-15 | End: 2021-04-28

## 2021-01-15 RX ORDER — HYDROXYZINE HYDROCHLORIDE 25 MG/1
25 TABLET, FILM COATED ORAL 3 TIMES DAILY PRN
Qty: 90 TABLET | Refills: 3 | Status: SHIPPED | OUTPATIENT
Start: 2021-01-15 | End: 2021-04-13

## 2021-01-15 RX ORDER — TRAZODONE HYDROCHLORIDE 50 MG/1
50 TABLET, FILM COATED ORAL AT BEDTIME
Qty: 90 TABLET | Refills: 1 | Status: SHIPPED | OUTPATIENT
Start: 2021-01-15 | End: 2021-06-14 | Stop reason: SINTOL

## 2021-01-15 SDOH — SOCIAL STABILITY - SOCIAL INSECURITY: PROBLEM RELATED TO SOCIAL ENVIRONMENT, UNSPECIFIED: Z60.9

## 2021-01-15 ASSESSMENT — ANXIETY QUESTIONNAIRES
5. BEING SO RESTLESS THAT IT IS HARD TO SIT STILL: NEARLY EVERY DAY
3. WORRYING TOO MUCH ABOUT DIFFERENT THINGS: NEARLY EVERY DAY
4. TROUBLE RELAXING: NEARLY EVERY DAY
GAD7 TOTAL SCORE: 21
2. NOT BEING ABLE TO STOP OR CONTROL WORRYING: NEARLY EVERY DAY
6. BECOMING EASILY ANNOYED OR IRRITABLE: NEARLY EVERY DAY
GAD7 TOTAL SCORE: 21
7. FEELING AFRAID AS IF SOMETHING AWFUL MIGHT HAPPEN: NEARLY EVERY DAY
7. FEELING AFRAID AS IF SOMETHING AWFUL MIGHT HAPPEN: NEARLY EVERY DAY
GAD7 TOTAL SCORE: 21
1. FEELING NERVOUS, ANXIOUS, OR ON EDGE: NEARLY EVERY DAY

## 2021-01-15 ASSESSMENT — ENCOUNTER SYMPTOMS
NERVOUS/ANXIOUS: 1
SHORTNESS OF BREATH: 1

## 2021-01-15 ASSESSMENT — PATIENT HEALTH QUESTIONNAIRE - PHQ9
10. IF YOU CHECKED OFF ANY PROBLEMS, HOW DIFFICULT HAVE THESE PROBLEMS MADE IT FOR YOU TO DO YOUR WORK, TAKE CARE OF THINGS AT HOME, OR GET ALONG WITH OTHER PEOPLE: SOMEWHAT DIFFICULT
SUM OF ALL RESPONSES TO PHQ QUESTIONS 1-9: 21
SUM OF ALL RESPONSES TO PHQ QUESTIONS 1-9: 21
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
## 2021-01-15 NOTE — PROGRESS NOTES
Pre-Visit Planning   Next 5 appointments (look out 90 days)    Jan 20, 2021  Arrive by 8:00 AM  Adult Preventative Visit with Yadi Zhu PA-C  [facility] ([facility]) [id number] [address] [facility] [address] [phone]        Appointment Notes for this encounter:   physical    Questionnaires Reviewed/Assigned  No additional questionnaires are needed        Patient preferred phone number: [phone]    Unable to reach. Left voicemail. Advised patient to call clinic back at [phone].

## 2021-01-15 NOTE — PROGRESS NOTES
Pre-Visit Planning   Next 5 appointments (look out 90 days)    Alejandro 15, 2021  Arrive by 8:10 AM  Office Visit with Vincent Mendoza MD  [facility] ([facility]) [address] [facility] [address] [phone]        Appointment Notes for this encounter:   Asthma f/u     Questionnaires Reviewed/Assigned  No additional questionnaires are needed        Patient preferred phone number: [phone]    Unable to reach. Left voicemail. Advised patient to call clinic back at [phone].

## 2021-01-16 ASSESSMENT — ANXIETY QUESTIONNAIRES: GAD7 TOTAL SCORE: 21

## 2021-01-16 ASSESSMENT — ASTHMA QUESTIONNAIRES: ACT_TOTALSCORE: 15

## 2021-01-19 PROBLEM — J45.40 MODERATE PERSISTENT ASTHMA WITHOUT COMPLICATION: Status: ACTIVE | Noted: 2021-01-19

## 2021-01-20 ENCOUNTER — OFFICE VISIT (OUTPATIENT)
Dept: FAMILY MEDICINE | Facility: CLINIC | Age: 26
End: 2021-01-20
Payer: COMMERCIAL

## 2021-01-20 VITALS
SYSTOLIC BLOOD PRESSURE: 122 MMHG | HEART RATE: 86 BPM | HEIGHT: 65 IN | DIASTOLIC BLOOD PRESSURE: 64 MMHG | TEMPERATURE: 98.4 F | OXYGEN SATURATION: 98 % | WEIGHT: 191 LBS | BODY MASS INDEX: 31.82 KG/M2 | RESPIRATION RATE: 16 BRPM

## 2021-01-20 DIAGNOSIS — Z30.41 ORAL CONTRACEPTIVE USE: ICD-10-CM

## 2021-01-20 DIAGNOSIS — A74.9 CHLAMYDIA: ICD-10-CM

## 2021-01-20 DIAGNOSIS — Z00.00 ROUTINE HISTORY AND PHYSICAL EXAMINATION OF ADULT: Primary | ICD-10-CM

## 2021-01-20 DIAGNOSIS — J45.40 MODERATE PERSISTENT ASTHMA WITHOUT COMPLICATION: ICD-10-CM

## 2021-01-20 DIAGNOSIS — Z12.4 PAP SMEAR FOR CERVICAL CANCER SCREENING: ICD-10-CM

## 2021-01-20 DIAGNOSIS — Z23 NEED FOR VACCINATION: ICD-10-CM

## 2021-01-20 DIAGNOSIS — Z11.3 SCREENING FOR VENEREAL DISEASE: ICD-10-CM

## 2021-01-20 PROCEDURE — 90471 IMMUNIZATION ADMIN: CPT | Performed by: PHYSICIAN ASSISTANT

## 2021-01-20 PROCEDURE — 87591 N.GONORRHOEAE DNA AMP PROB: CPT | Performed by: PHYSICIAN ASSISTANT

## 2021-01-20 PROCEDURE — 87491 CHLMYD TRACH DNA AMP PROBE: CPT | Performed by: PHYSICIAN ASSISTANT

## 2021-01-20 PROCEDURE — 90651 9VHPV VACCINE 2/3 DOSE IM: CPT | Performed by: PHYSICIAN ASSISTANT

## 2021-01-20 PROCEDURE — G0145 SCR C/V CYTO,THINLAYER,RESCR: HCPCS | Performed by: PHYSICIAN ASSISTANT

## 2021-01-20 PROCEDURE — 99395 PREV VISIT EST AGE 18-39: CPT | Mod: 25 | Performed by: PHYSICIAN ASSISTANT

## 2021-01-20 RX ORDER — AZITHROMYCIN 500 MG/1
1000 TABLET, FILM COATED ORAL ONCE
Qty: 2 TABLET | Refills: 0 | Status: SHIPPED | OUTPATIENT
Start: 2021-01-20 | End: 2021-01-20

## 2021-01-20 RX ORDER — PREDNISONE 20 MG/1
40 TABLET ORAL DAILY
Qty: 10 TABLET | Refills: 0 | Status: SHIPPED | OUTPATIENT
Start: 2021-01-20 | End: 2021-01-25

## 2021-01-20 RX ORDER — NORETHINDRONE ACETATE AND ETHINYL ESTRADIOL 1MG-20(21)
1 KIT ORAL DAILY
Qty: 84 TABLET | Refills: 3 | Status: SHIPPED | OUTPATIENT
Start: 2021-01-20 | End: 2021-12-27

## 2021-01-20 ASSESSMENT — ENCOUNTER SYMPTOMS
DIZZINESS: 0
NERVOUS/ANXIOUS: 1
CONSTIPATION: 0
DIARRHEA: 0
FEVER: 0
HEARTBURN: 0
ABDOMINAL PAIN: 1
MYALGIAS: 0
ARTHRALGIAS: 0
WEAKNESS: 0
FREQUENCY: 1
JOINT SWELLING: 0
CHILLS: 0
HEMATURIA: 0
PARESTHESIAS: 0
NAUSEA: 0
HEADACHES: 1
HEMATOCHEZIA: 0
EYE PAIN: 0
COUGH: 0
SHORTNESS OF BREATH: 1
SORE THROAT: 0
PALPITATIONS: 0
DYSURIA: 0

## 2021-01-20 ASSESSMENT — MIFFLIN-ST. JEOR: SCORE: 1622.25

## 2021-01-20 NOTE — PROGRESS NOTES
Future Appointments   Date Time Provider Department Center   1/20/2021  8:25 AM Yadi Zhu PA-C [facility]     Appointment Notes for this encounter:   physical    Health Maintenance Due   Topic Date Due     CHLAMYDIA SCREENING  1997     Pneumococcal Vaccine: Pediatrics (0 to 5 Years) and At-Risk Patients (6 to 64 Years) (1 of 1 - PPSV23) 11/02/2003     HIV SCREENING  11/02/2012     HEPATITIS C SCREENING  11/02/2015     PAP  11/02/2018     DTAP/TDAP/TD IMMUNIZATION (2 - Td) 05/18/2020     HPV IMMUNIZATION (2 - 3-dose series) 12/16/2020     Health Maintenance addressed:  Pap Smear and Immunizations    Pap Smear Possibly complete today - per provider review and Immunizations 2nd HPV    MyChart Status:  Not Active Patient declined, will ask again       SUBJECTIVE:   CC: Azul Rojas is an 25 year old woman who presents for preventive health visit.       Patient has been advised of split billing requirements and indicates understanding: Yes  Healthy Habits:     Getting at least 3 servings of Calcium per day:  NO    Bi-annual eye exam:  NO    Dental care twice a year:  NO    Sleep apnea or symptoms of sleep apnea:  None    Diet:  Regular (no restrictions)    Frequency of exercise:  4-5 days/week    Duration of exercise:  45-60 minutes    Taking medications regularly:  Yes    Medication side effects:  None    PHQ-2 Total Score: 6    Additional concerns today:  No      First pap today, pt is a little anxious. Also pretty sure she has chlamydia, will treat empirically and swab today as well.  Does not currently have any sexual partners.      Today's PHQ-2 Score:   PHQ-2 ( 1999 [employer]) 1/20/2021   Q1: Little interest or pleasure in doing things 3   Q2: Feeling down, depressed or hopeless 3   PHQ-2 Score 6   Q1: Little interest or pleasure in doing things Nearly every day   Q2: Feeling down, depressed or hopeless Nearly every day   PHQ-2 Score 6       Abuse: Current or Past (Physical, Sexual or  Emotional) - No  Do you feel safe in your environment? Yes        Social History     Tobacco Use     Smoking status: Former Smoker     Smokeless tobacco: Former User   Substance Use Topics     Alcohol use: No     If you drink alcohol do you typically have >3 drinks per day or >7 drinks per week? No    Alcohol Use 1/20/2021   Prescreen: >3 drinks/day or >7 drinks/week? Not Applicable   Prescreen: >3 drinks/day or >7 drinks/week? -       Reviewed orders with patient.  Reviewed health maintenance and updated orders accordingly - Yes  BP Readings from Last 3 Encounters:   01/20/21 122/64   01/15/21 114/64   11/13/20 133/81    Wt Readings from Last 3 Encounters:   01/20/21 86.6 kg (191 lb)   01/15/21 86.6 kg (190 lb 14.4 oz)   11/13/20 85.4 kg (188 lb 4.4 oz)                  Recent Labs   Lab Test 03/13/20  1436   ALT 39   CR 0.64   GFRESTIMATED >90   GFRESTBLACK >90   POTASSIUM 3.4        Mammogram not appropriate for this patient based on age.    Pertinent mammograms are reviewed under the imaging tab.  History of abnormal Pap smear: NO - age 21-29 PAP every 3 years recommended     Reviewed and updated as needed this visit by clinical staff  Tobacco  Allergies  Meds  Problems  Med Hx  Surg Hx  Fam Hx  Soc Hx          Reviewed and updated as needed this visit by Provider  Tobacco  Allergies  Meds  Problems  Med Hx  Surg Hx  Fam Hx           Review of Systems   Constitutional: Negative for chills and fever.   HENT: Negative for congestion, ear pain, hearing loss and sore throat.    Eyes: Negative for pain and visual disturbance.   Respiratory: Positive for shortness of breath. Negative for cough.    Cardiovascular: Positive for chest pain. Negative for palpitations and peripheral edema.   Gastrointestinal: Positive for abdominal pain. Negative for constipation, diarrhea, heartburn, hematochezia and nausea.   Genitourinary: Positive for frequency and urgency. Negative for dysuria, genital sores and  "hematuria.   Musculoskeletal: Negative for arthralgias, joint swelling and myalgias.   Skin: Negative for rash.   Neurological: Positive for headaches. Negative for dizziness, weakness and paresthesias.   Psychiatric/Behavioral: Positive for mood changes. The patient is nervous/anxious.         OBJECTIVE:   /64 (BP Location: Right arm, Patient Position: Chair, Cuff Size: Adult Regular)   Pulse 86   Temp 98.4  F (36.9  C) (Oral)   Resp 16   Ht 1.651 m (5' 5\")   Wt 86.6 kg (191 lb)   SpO2 98%   Breastfeeding No   BMI 31.78 kg/m    Physical Exam  GENERAL: healthy, alert and no distress  EYES: Eyes grossly normal to inspection, PERRL and conjunctivae and sclerae normal  HENT: ear canals and TM's normal, nose and mouth without ulcers or lesions  NECK: no adenopathy, no asymmetry, masses, or scars and thyroid normal to palpation  RESP: lungs clear to auscultation - no rales, rhonchi, scant wheezes  CV: regular rate and rhythm, normal S1 S2, no S3 or S4, no murmur, click or rub, no peripheral edema and peripheral pulses strong  ABDOMEN: soft, nontender, no hepatosplenomegaly, no masses and bowel sounds normal   (female): normal female external genitalia, normal urethral meatus, vaginal mucosa pink, moist, well rugated, and normal cervix/adnexa/uterus without masses, white discharge.  MS: no gross musculoskeletal defects noted, no edema  SKIN: no suspicious lesions or rashes  NEURO: Normal strength and tone, mentation intact and speech normal  PSYCH: mentation appears normal and anxious    Diagnostic Test Results:  Labs reviewed in Epic    ASSESSMENT/PLAN:       ICD-10-CM    1. Routine history and physical examination of adult  Z00.00    2. Pap smear for cervical cancer screening  Z12.4 Pap imaged thin layer screen only - recommended age 21 - 24 years   3. Oral contraceptive use  Z30.41 norethindrone-ethinyl estradiol (JUNEL FE 1/20) 1-20 MG-MCG tablet   4. Screening for venereal disease  Z11.3 Chlamydia " "trachomatis PCR     Neisseria gonorrhoeae PCR   5. Need for vaccination  Z23 HPV, IM (9 - 26 YRS) - Gardasil 9   6. Chlamydia  A74.9 azithromycin (ZITHROMAX) 500 MG tablet   7. Moderate persistent asthma without complication  J45.40 predniSONE (DELTASONE) 20 MG tablet   OCP refilled.  5 day course of prednisone for asthma exacerbation.  1 dose of azithromycin for likely chlamydia infection.  Pt swabbed as well.  2nd HPV vaccine today.    Patient has been advised of split billing requirements and indicates understanding: Yes  COUNSELING:  Reviewed preventive health counseling, as reflected in patient instructions    Estimated body mass index is 31.78 kg/m  as calculated from the following:    Height as of this encounter: 1.651 m (5' 5\").    Weight as of this encounter: 86.6 kg (191 lb).    Weight management plan: Discussed healthy diet and exercise guidelines    She reports that she has quit smoking. She has quit using smokeless tobacco.      Counseling Resources:  ATP IV Guidelines  Pooled Cohorts Equation Calculator  Breast Cancer Risk Calculator  BRCA-Related Cancer Risk Assessment: FHS-7 Tool  FRAX Risk Assessment  ICSI Preventive Guidelines  Dietary Guidelines for Americans, 2010  USDA's MyPlate  ASA Prophylaxis  Lung CA Screening    Yadi Zhu PA-C  [facility]  Answers for HPI/ROS submitted by the patient on 1/20/2021   Annual Exam:  If you checked off any problems, how difficult have these problems made it for you to do your work, take care of things at home, or get along with other people?: Somewhat difficult  PHQ9 TOTAL SCORE: 21    "

## 2021-01-20 NOTE — LETTER
January 21, 2021      Azul Galanl  [address] [place] [address]        Dear ,    We are writing to inform you of your test results.    - Your lab results look stable; everything is normal.   - You will receive your Pap Smear results in a separate letter.     Resulted Orders   Chlamydia trachomatis PCR   Result Value Ref Range    Specimen Description Vagina     Chlamydia Trachomatis PCR Negative NEG^Negative      Comment:      Negative for C. trachomatis rRNA by transcription mediated amplification.  A negative result by transcription mediated amplification does not preclude   the presence of C. trachomatis infection because results are dependent on   proper and adequate collection, absence of inhibitors, and sufficient rRNA to   be detected.     Neisseria gonorrhoeae PCR   Result Value Ref Range    Specimen Descrip Vagina     N Gonorrhea PCR Negative NEG^Negative      Comment:      Negative for N. gonorrhoeae rRNA by transcription mediated amplification.  A negative result by transcription mediated amplification does not preclude   the presence of N. gonorrhoeae infection because results are dependent on   proper and adequate collection, absence of inhibitors, and sufficient rRNA to   be detected.         If you have any questions or concerns, please call the clinic at the number listed above.       Sincerely,      Yadi Zhu PA-C

## 2021-01-21 LAB
C TRACH DNA SPEC QL NAA+PROBE: NEGATIVE
N GONORRHOEA DNA SPEC QL NAA+PROBE: NEGATIVE
SPECIMEN SOURCE: NORMAL
SPECIMEN SOURCE: NORMAL

## 2021-01-21 ASSESSMENT — PATIENT HEALTH QUESTIONNAIRE - PHQ9: SUM OF ALL RESPONSES TO PHQ QUESTIONS 1-9: 21

## 2021-01-21 NOTE — RESULT ENCOUNTER NOTE
Please generate lab letter with comments below:  - Your lab results look stable; everything is normal.  - You will receive your Pap Smear results in a separate letter.

## 2021-01-25 ENCOUNTER — TELEPHONE (OUTPATIENT)
Dept: FAMILY MEDICINE | Facility: CLINIC | Age: 26
End: 2021-01-25

## 2021-01-25 LAB
COPATH REPORT: NORMAL
PAP: NORMAL

## 2021-01-25 NOTE — TELEPHONE ENCOUNTER
Patient called requesting to know the status of her Chlamydia test.     Informed patient that her chlamydia test was normal and that she should receive the results in the mail. Informed her that her pap smear results are still pending.     _____________________________________________     Yadi Zhu PA-C   1/21/2021 2:26 PM CST   Please generate lab letter with comments below:   - Your lab results look stable; everything is normal.   - You will receive your Pap Smear results in a separate letter.      Mita Hi RN on 1/25/2021 at 8:33 AM

## 2021-04-12 NOTE — PROGRESS NOTES
Pre-Visit Planning   Next 5 appointments (look out 90 days)    Apr 13, 2021  Arrive by 9:40 AM  Office Visit with Vincent Mendoza MD  [facility] ([facility] ) [address] [facility] [address] [phone]        Appointment Notes for this encounter:   Burning sensation in stomach, ongoing    Questionnaires Reviewed/Assigned  No additional questionnaires are needed        Patient preferred phone number: [phone]    Unable to reach. Left voicemail. Advised patient to call clinic back at [phone].

## 2021-04-13 ENCOUNTER — OFFICE VISIT (OUTPATIENT)
Dept: FAMILY MEDICINE | Facility: CLINIC | Age: 26
End: 2021-04-13
Payer: COMMERCIAL

## 2021-04-13 VITALS
BODY MASS INDEX: 28.51 KG/M2 | HEART RATE: 100 BPM | WEIGHT: 167 LBS | OXYGEN SATURATION: 98 % | HEIGHT: 64 IN | DIASTOLIC BLOOD PRESSURE: 80 MMHG | TEMPERATURE: 98.3 F | RESPIRATION RATE: 20 BRPM | SYSTOLIC BLOOD PRESSURE: 118 MMHG

## 2021-04-13 DIAGNOSIS — F32.A ANXIETY AND DEPRESSION: ICD-10-CM

## 2021-04-13 DIAGNOSIS — R10.13 DYSPEPSIA: ICD-10-CM

## 2021-04-13 DIAGNOSIS — R63.4 WEIGHT LOSS: Primary | ICD-10-CM

## 2021-04-13 DIAGNOSIS — F41.9 ANXIETY AND DEPRESSION: ICD-10-CM

## 2021-04-13 DIAGNOSIS — R05.9 COUGH: ICD-10-CM

## 2021-04-13 DIAGNOSIS — R06.02 SHORTNESS OF BREATH: ICD-10-CM

## 2021-04-13 LAB
ANION GAP SERPL CALCULATED.3IONS-SCNC: 2 MMOL/L (ref 3–14)
BUN SERPL-MCNC: 7 MG/DL (ref 7–30)
CALCIUM SERPL-MCNC: 9.3 MG/DL (ref 8.5–10.1)
CHLORIDE SERPL-SCNC: 109 MMOL/L (ref 94–109)
CO2 SERPL-SCNC: 28 MMOL/L (ref 20–32)
CREAT SERPL-MCNC: 0.68 MG/DL (ref 0.52–1.04)
ERYTHROCYTE [DISTWIDTH] IN BLOOD BY AUTOMATED COUNT: 12.8 % (ref 10–15)
GFR SERPL CREATININE-BSD FRML MDRD: >90 ML/MIN/{1.73_M2}
GLUCOSE SERPL-MCNC: 92 MG/DL (ref 70–99)
HCT VFR BLD AUTO: 47.5 % (ref 35–47)
HGB BLD-MCNC: 15.5 G/DL (ref 11.7–15.7)
MAGNESIUM SERPL-MCNC: 2.4 MG/DL (ref 1.6–2.3)
MCH RBC QN AUTO: 28.9 PG (ref 26.5–33)
MCHC RBC AUTO-ENTMCNC: 32.6 G/DL (ref 31.5–36.5)
MCV RBC AUTO: 89 FL (ref 78–100)
PHOSPHATE SERPL-MCNC: 2.9 MG/DL (ref 2.5–4.5)
PLATELET # BLD AUTO: 251 10E9/L (ref 150–450)
POTASSIUM SERPL-SCNC: 3.8 MMOL/L (ref 3.4–5.3)
RBC # BLD AUTO: 5.36 10E12/L (ref 3.8–5.2)
SODIUM SERPL-SCNC: 139 MMOL/L (ref 133–144)
WBC # BLD AUTO: 11.5 10E9/L (ref 4–11)

## 2021-04-13 PROCEDURE — 36415 COLL VENOUS BLD VENIPUNCTURE: CPT | Performed by: FAMILY MEDICINE

## 2021-04-13 PROCEDURE — 80048 BASIC METABOLIC PNL TOTAL CA: CPT | Performed by: FAMILY MEDICINE

## 2021-04-13 PROCEDURE — 84100 ASSAY OF PHOSPHORUS: CPT | Performed by: FAMILY MEDICINE

## 2021-04-13 PROCEDURE — 99214 OFFICE O/P EST MOD 30 MIN: CPT | Performed by: FAMILY MEDICINE

## 2021-04-13 PROCEDURE — 84443 ASSAY THYROID STIM HORMONE: CPT | Performed by: FAMILY MEDICINE

## 2021-04-13 PROCEDURE — 83735 ASSAY OF MAGNESIUM: CPT | Performed by: FAMILY MEDICINE

## 2021-04-13 PROCEDURE — 85027 COMPLETE CBC AUTOMATED: CPT | Performed by: FAMILY MEDICINE

## 2021-04-13 RX ORDER — IPRATROPIUM BROMIDE AND ALBUTEROL SULFATE 2.5; .5 MG/3ML; MG/3ML
1 SOLUTION RESPIRATORY (INHALATION) EVERY 4 HOURS PRN
Qty: 3 ML | Refills: 11 | Status: SHIPPED | OUTPATIENT
Start: 2021-04-13 | End: 2021-04-28

## 2021-04-13 RX ORDER — BENZONATATE 100 MG/1
100 CAPSULE ORAL 3 TIMES DAILY PRN
Qty: 30 CAPSULE | Refills: 1 | Status: SHIPPED | OUTPATIENT
Start: 2021-04-13 | End: 2021-06-28

## 2021-04-13 RX ORDER — OMEPRAZOLE 40 MG/1
40 CAPSULE, DELAYED RELEASE ORAL DAILY
Qty: 14 CAPSULE | Refills: 0 | Status: SHIPPED | OUTPATIENT
Start: 2021-04-13 | End: 2021-04-28

## 2021-04-13 RX ORDER — HYDROXYZINE HYDROCHLORIDE 25 MG/1
25 TABLET, FILM COATED ORAL 3 TIMES DAILY PRN
Qty: 90 TABLET | Refills: 3 | Status: SHIPPED | OUTPATIENT
Start: 2021-04-13 | End: 2021-04-28

## 2021-04-13 ASSESSMENT — ANXIETY QUESTIONNAIRES
5. BEING SO RESTLESS THAT IT IS HARD TO SIT STILL: MORE THAN HALF THE DAYS
IF YOU CHECKED OFF ANY PROBLEMS ON THIS QUESTIONNAIRE, HOW DIFFICULT HAVE THESE PROBLEMS MADE IT FOR YOU TO DO YOUR WORK, TAKE CARE OF THINGS AT HOME, OR GET ALONG WITH OTHER PEOPLE: SOMEWHAT DIFFICULT
GAD7 TOTAL SCORE: 19
1. FEELING NERVOUS, ANXIOUS, OR ON EDGE: NEARLY EVERY DAY
6. BECOMING EASILY ANNOYED OR IRRITABLE: NEARLY EVERY DAY
2. NOT BEING ABLE TO STOP OR CONTROL WORRYING: NEARLY EVERY DAY
3. WORRYING TOO MUCH ABOUT DIFFERENT THINGS: NEARLY EVERY DAY
7. FEELING AFRAID AS IF SOMETHING AWFUL MIGHT HAPPEN: NEARLY EVERY DAY

## 2021-04-13 ASSESSMENT — PATIENT HEALTH QUESTIONNAIRE - PHQ9
SUM OF ALL RESPONSES TO PHQ QUESTIONS 1-9: 25
SUM OF ALL RESPONSES TO PHQ QUESTIONS 1-9: 25
5. POOR APPETITE OR OVEREATING: MORE THAN HALF THE DAYS
10. IF YOU CHECKED OFF ANY PROBLEMS, HOW DIFFICULT HAVE THESE PROBLEMS MADE IT FOR YOU TO DO YOUR WORK, TAKE CARE OF THINGS AT HOME, OR GET ALONG WITH OTHER PEOPLE: SOMEWHAT DIFFICULT

## 2021-04-13 ASSESSMENT — MIFFLIN-ST. JEOR: SCORE: 1497.51

## 2021-04-13 ASSESSMENT — ENCOUNTER SYMPTOMS: ABDOMINAL PAIN: 1

## 2021-04-13 NOTE — PROGRESS NOTES
Assessment & Plan     Anxiety and depression  Severe symptoms, suicidal ideation last month.  Refer to psychiatry, safety plan reviewed, emergency department if actively suicidal.  Continue Atarax as needed for anxiety.  Will contact patient in 1 week and check to see if she's getting to psychiatry.  Otherwise we will follow-up in 2 weeks in the interim while referral is pending  - hydrOXYzine (ATARAX) 25 MG tablet  Dispense: 90 tablet; Refill: 3  - MENTAL HEALTH REFERRAL  - Adult; Psychiatry; Psychiatry; [facility] to [facility]iat[facility] as indicated ([phone]); Yes; Chronic Mental Health without improvement; Yes; We will contact you to schedule ...    Shortness of breath  Refilled  - ipratropium - albuterol 0.5 mg/2.5 mg/3 mL (DUONEB) 0.5-2.5 (3) MG/3ML neb solution  Dispense: 3 mL; Refill: 11    Weight loss  Patient had 24 pounds unintentional weight loss in the last 3 months, possibly due to problem number one.  Check electrolytes, monitor for potential refeeding syndrome.  She does have some mild leukocytosis which is likely reactive.  I currently do not suspect any infectious or inflammatory etiology such as colitis, appendicitis at this time.  - Basic metabolic panel  (Ca, Cl, CO2, Creat, Gluc, K, Na, BUN)  - CBC with platelets  - TSH with free T4 reflex  - Magnesium  - Phosphorus    Dyspepsia  Fracture 2 times, start PPI x2 weeks.  Consider work-up for H. pylori in future if symptoms refractory.   - omeprazole (PRILOSEC) 40 MG DR capsule  Dispense: 14 capsule; Refill: 0    Cough  - benzonatate (TESSALON) 100 MG capsule  Dispense: 30 capsule; Refill: 1       Depression Screening Follow Up        Return in about 2 weeks (around 4/27/2021) for mental health recheck .    Vincent Mendoza MD  [facility]    Kristine Liang is a 25 year old who presents for the following health issues     Abdominal Pain     History of Present Illness      Asthma:  She presents for follow up of asthma.  She has some cough, no wheezing, and no shortness of breath. She is using a relief medication 2-3 times per day. She does not miss any doses of her controller medication throughout the week.Patient is aware of the following triggers: animal dander, cold air, dust mites, emotions, exercise or sports, humidity, insects/rodents, mold, smoke, strong odors and fumes and upper respiratory infections. The patient has not had a visit to the Emergency Room, Urgent Care or Hospital due to asthma since the last clinic visit.     She eats 0-1 servings of fruits and vegetables daily.She consumes 2 sweetened beverage(s) daily.She exercises with enough effort to increase her heart rate 30 to 60 minutes per day.  She exercises with enough effort to increase her heart rate 5 days per week.   She is taking medications regularly.     Answers for HPI/ROS submitted by the patient on 4/13/2021   Chronic problems general questions HPI Form  If you checked off any problems, how difficult have these problems made it for you to do your work, take care of things at home, or get along with other people?: Somewhat difficult  PHQ9 TOTAL SCORE: 25  PHQ7 TOTAL SCORE: 19        Abdominal/Flank Pain  Onset/Duration: x2 months, since before last visit it was more mild.  Description:   Character: Burning, worse when eating ANY food.  Location: epigastric region right upper quadrant left upper quadrant mostly, sometimes right lower quadrant left lower quadrant  Radiation: Sternal area  Intensity: moderate  Progression of Symptoms:  worsening and waxing and waning, sometimes constant  Accompanying Signs & Symptoms:  Fever/Chills: no  Gas/Bloating: no  Nausea: YES  Vomitting: YES- strong urge to vomit after eating.  Diarrhea: YES-  Constipation: no  Dysuria or Hematuria: no  History:   Trauma: no  Previous similar pain: no  Previous tests done: no  Precipitating factors:   Does the pain change with:      "Food: YES    Bowel Movement: no    Urination: no, but very frequent.   Other factors:  no  Therapies tried and outcome: Drinking water - helps improve pain  No LMP recorded. (Menstrual status: Birth Control).      Patient reports worsening of her mental health.  Since we last saw her 3 months ago, has had a new boyfriend; really, she would like to lose some weight however currently denies any desire for additional weight loss.  Feels safe at home, denies any new stressors at home.  Continues to work at [employer].  Denies any abuse, no illicit drugs.          Review of Systems   Gastrointestinal: Positive for abdominal pain.            Objective    /80 (BP Location: Right arm, Patient Position: Sitting, Cuff Size: Adult Regular)   Pulse 100   Temp 98.3  F (36.8  C) (Oral)   Resp 20   Ht 1.626 m (5' 4\")   Wt 75.8 kg (167 lb)   SpO2 98%   BMI 28.67 kg/m    Body mass index is 28.67 kg/m .  Physical Exam  HENT:      Head:      Comments: No mastoid hypertrophy  Cardiovascular:      Rate and Rhythm: Normal rate and regular rhythm.   Pulmonary:      Effort: Pulmonary effort is normal.      Breath sounds: Normal breath sounds.   Abdominal:      General: Bowel sounds are normal. There is no distension.      Palpations: Abdomen is soft.      Tenderness: There is no abdominal tenderness.   Skin:     Comments: Old, well-healed scars on both of her forearms.    Negative Willi sign   Psychiatric:      Comments: Emotionally labile, tearful, guarded.  Flat affect.         CBC RESULTS:   Recent Labs   Lab Test 04/13/21  1035   WBC 11.5*   RBC 5.36*   HGB 15.5   HCT 47.5*   MCV 89   MCH 28.9   MCHC 32.6   RDW 12.8                  "

## 2021-04-14 LAB — TSH SERPL DL<=0.005 MIU/L-ACNC: 0.76 MU/L (ref 0.4–4)

## 2021-04-14 ASSESSMENT — ANXIETY QUESTIONNAIRES: GAD7 TOTAL SCORE: 19

## 2021-04-14 ASSESSMENT — PATIENT HEALTH QUESTIONNAIRE - PHQ9: SUM OF ALL RESPONSES TO PHQ QUESTIONS 1-9: 25

## 2021-04-27 NOTE — PROGRESS NOTES
Pre-Visit Planning   Next 5 appointments (look out 90 days)    Apr 28, 2021  Arrive by 9:30 AM  Office Visit with Vincent Mendoza MD  [facility] [address] [facility] [address] [phone]        Appointment Notes for this encounter:   Reviewed zg mental health recheck     Questionnaires Reviewed/Assigned  No additional questionnaires are needed        Patient preferred phone number: [phone]    Unable to reach. Left voicemail. Advised patient to call clinic back at [phone].

## 2021-04-28 ENCOUNTER — OFFICE VISIT (OUTPATIENT)
Dept: FAMILY MEDICINE | Facility: CLINIC | Age: 26
End: 2021-04-28
Payer: COMMERCIAL

## 2021-04-28 VITALS
TEMPERATURE: 98.4 F | OXYGEN SATURATION: 97 % | WEIGHT: 162 LBS | HEART RATE: 82 BPM | DIASTOLIC BLOOD PRESSURE: 62 MMHG | SYSTOLIC BLOOD PRESSURE: 110 MMHG | RESPIRATION RATE: 16 BRPM | BODY MASS INDEX: 27.66 KG/M2 | HEIGHT: 64 IN

## 2021-04-28 DIAGNOSIS — J45.40 MODERATE PERSISTENT ASTHMA WITHOUT COMPLICATION: ICD-10-CM

## 2021-04-28 DIAGNOSIS — F41.9 ANXIETY AND DEPRESSION: ICD-10-CM

## 2021-04-28 DIAGNOSIS — F32.A ANXIETY AND DEPRESSION: ICD-10-CM

## 2021-04-28 DIAGNOSIS — R10.13 DYSPEPSIA: ICD-10-CM

## 2021-04-28 DIAGNOSIS — R06.02 SHORTNESS OF BREATH: ICD-10-CM

## 2021-04-28 PROCEDURE — 99214 OFFICE O/P EST MOD 30 MIN: CPT | Performed by: FAMILY MEDICINE

## 2021-04-28 RX ORDER — HYDROXYZINE HYDROCHLORIDE 25 MG/1
25 TABLET, FILM COATED ORAL 3 TIMES DAILY PRN
Qty: 90 TABLET | Refills: 3 | Status: SHIPPED | OUTPATIENT
Start: 2021-04-28

## 2021-04-28 RX ORDER — OMEPRAZOLE 40 MG/1
40 CAPSULE, DELAYED RELEASE ORAL DAILY
Qty: 30 CAPSULE | Refills: 5 | Status: SHIPPED | OUTPATIENT
Start: 2021-04-28

## 2021-04-28 RX ORDER — ALBUTEROL SULFATE 90 UG/1
2 AEROSOL, METERED RESPIRATORY (INHALATION) EVERY 4 HOURS PRN
Qty: 18 G | Refills: 11 | Status: SHIPPED | OUTPATIENT
Start: 2021-04-28

## 2021-04-28 RX ORDER — IPRATROPIUM BROMIDE AND ALBUTEROL SULFATE 2.5; .5 MG/3ML; MG/3ML
1 SOLUTION RESPIRATORY (INHALATION) EVERY 4 HOURS PRN
Qty: 3 ML | Refills: 11 | Status: SHIPPED | OUTPATIENT
Start: 2021-04-28 | End: 2021-10-15

## 2021-04-28 ASSESSMENT — PATIENT HEALTH QUESTIONNAIRE - PHQ9
SUM OF ALL RESPONSES TO PHQ QUESTIONS 1-9: 25
SUM OF ALL RESPONSES TO PHQ QUESTIONS 1-9: 25
10. IF YOU CHECKED OFF ANY PROBLEMS, HOW DIFFICULT HAVE THESE PROBLEMS MADE IT FOR YOU TO DO YOUR WORK, TAKE CARE OF THINGS AT HOME, OR GET ALONG WITH OTHER PEOPLE: SOMEWHAT DIFFICULT

## 2021-04-28 ASSESSMENT — ANXIETY QUESTIONNAIRES
GAD7 TOTAL SCORE: 19
5. BEING SO RESTLESS THAT IT IS HARD TO SIT STILL: SEVERAL DAYS
3. WORRYING TOO MUCH ABOUT DIFFERENT THINGS: NEARLY EVERY DAY
GAD7 TOTAL SCORE: 19
1. FEELING NERVOUS, ANXIOUS, OR ON EDGE: NEARLY EVERY DAY
6. BECOMING EASILY ANNOYED OR IRRITABLE: NEARLY EVERY DAY
GAD7 TOTAL SCORE: 19
7. FEELING AFRAID AS IF SOMETHING AWFUL MIGHT HAPPEN: NEARLY EVERY DAY
2. NOT BEING ABLE TO STOP OR CONTROL WORRYING: NEARLY EVERY DAY
7. FEELING AFRAID AS IF SOMETHING AWFUL MIGHT HAPPEN: NEARLY EVERY DAY
4. TROUBLE RELAXING: NEARLY EVERY DAY

## 2021-04-28 ASSESSMENT — ENCOUNTER SYMPTOMS: NERVOUS/ANXIOUS: 1

## 2021-04-28 ASSESSMENT — MIFFLIN-ST. JEOR: SCORE: 1474.83

## 2021-04-28 NOTE — PROGRESS NOTES
Assessment & Plan     Anxiety and depression  Schedule follow-up with psychiatry, reviewed, refill Atarax for anxiety and panic.  Congratulated patient on scheduling the follow-up for much-needed psychiatric care as she has chronic mental health without improvement.  - hydrOXYzine (ATARAX) 25 MG tablet  Dispense: 90 tablet; Refill: 3    Dyspepsia  Continue current medical management.  We went over her labs and she does not have evidence of other electrolyte derangements, especially given the weight loss.  - omeprazole (PRILOSEC) 40 MG DR capsule  Dispense: 30 capsule; Refill: 5    Moderate persistent asthma without complication  Refilled.  She has deescalated her controller medication and is currently not wheezing, suspect possible shortness of breath due to mental health and anxiety  - albuterol (PROAIR HFA/PROVENTIL HFA/VENTOLIN HFA) 108 (90 Base) MCG/ACT inhaler  Dispense: 18 g; Refill: 11    Shortness of breath  Refilled.   - ipratropium - albuterol 0.5 mg/2.5 mg/3 mL (DUONEB) 0.5-2.5 (3) MG/3ML neb solution  Dispense: 3 mL; Refill: 11         Depression Screening Follow Up    PHQ 4/28/2021   PHQ-9 Total Score 25   Q9: Thoughts of better off dead/self-harm past 2 weeks Nearly every day   F/U: Thoughts of suicide or self-harm Yes   F/U: Self harm-plan No   F/U: Self-harm action No   F/U: Safety concerns No     Return in about 7 weeks (around 6/14/2021) for psych visit, Appointment already scheduled..    Vincent Mendoza MD  [facility]    Kristine Liang is a 25 year old who presents for the following health issues     History of Present Illness     Asthma:  She presents for follow up of asthma.  She has no cough, no wheezing, and no shortness of breath. She is using a relief medication 2-3 times per day. She does not miss any doses of her controller medication throughout the week.Patient is aware of the following triggers: same as previous visit. The patient has not had a visit to the  "Emergency Room, Urgent Care or Hospital due to asthma since the last clinic visit.     Mental Health Follow-up:  Patient presents to follow-up on Depression & Anxiety.Patient's depression since last visit has been:  No change  The patient is not having other symptoms associated with depression.  Patient's anxiety since last visit has been:  No change  The patient is not having other symptoms associated with anxiety.  Any significant life events: No  Patient is feeling anxious or having panic attacks.  Patient has no concerns about alcohol or drug use.     Social History  Tobacco Use    Smoking status: Former Smoker    Smokeless tobacco: Former User  Alcohol use: No  Drug use: Yes    Types: Marijuana      Today's PHQ-9         PHQ-9 Total Score:     (P) 25   PHQ-9 Q9 Thoughts of better off dead/self-harm past 2 weeks :   (P) Nearly every day   Thoughts of suicide or self harm:  (P) Yes   Self-harm Plan:    (P) No   Self-harm Action:      (P) No   Safety concerns for self or others: (P) No         She eats 0-1 servings of fruits and vegetables daily.She consumes 3 sweetened beverage(s) daily.She exercises with enough effort to increase her heart rate 30 to 60 minutes per day.  She exercises with enough effort to increase her heart rate 3 or less days per week.   She is taking medications regularly.         No changes in her life.  No new stressors.  Continues to get to work without difficulty, currently working at [employer].  Boyfriend is supportive, not using any alcohol tobacco or illicit drugs.  She is excited to get started with psychiatry.    Review of Systems   Psychiatric/Behavioral: The patient is nervous/anxious.             Objective    /62 (Cuff Size: Adult Regular)   Pulse 82   Temp 98.4  F (36.9  C) (Oral)   Resp 16   Ht 1.626 m (5' 4\")   Wt 73.5 kg (162 lb)   SpO2 97%   BMI 27.81 kg/m    Body mass index is 27.81 kg/m .  Physical Exam  Vitals signs reviewed.   Constitutional:       Appearance: " She is not ill-appearing.   Cardiovascular:      Rate and Rhythm: Normal rate and regular rhythm.   Pulmonary:      Effort: Pulmonary effort is normal.      Breath sounds: Normal breath sounds.   Abdominal:      General: Abdomen is flat. There is no distension.      Palpations: Abdomen is soft.      Tenderness: There is no abdominal tenderness.   Psychiatric:         Judgment: Judgment normal.        CBC RESULTS:   Recent Labs   Lab Test 04/13/21  1035   WBC 11.5*   RBC 5.36*   HGB 15.5   HCT 47.5*   MCV 89   MCH 28.9   MCHC 32.6   RDW 12.8        Last Comprehensive Metabolic Panel:  Sodium   Date Value Ref Range Status   04/13/2021 139 133 - 144 mmol/L Final     Potassium   Date Value Ref Range Status   04/13/2021 3.8 3.4 - 5.3 mmol/L Final     Chloride   Date Value Ref Range Status   04/13/2021 109 94 - 109 mmol/L Final     Carbon Dioxide   Date Value Ref Range Status   04/13/2021 28 20 - 32 mmol/L Final     Anion Gap   Date Value Ref Range Status   04/13/2021 2 (L) 3 - 14 mmol/L Final     Glucose   Date Value Ref Range Status   04/13/2021 92 70 - 99 mg/dL Final     Urea Nitrogen   Date Value Ref Range Status   04/13/2021 7 7 - 30 mg/dL Final     Creatinine   Date Value Ref Range Status   04/13/2021 0.68 0.52 - 1.04 mg/dL Final     GFR Estimate   Date Value Ref Range Status   04/13/2021 >90 >60 mL/min/[1.73_m2] Final     Comment:     Non  GFR Calc  Starting 12/18/2018, serum creatinine based estimated GFR (eGFR) will be   calculated using the Chronic Kidney Disease Epidemiology Collaboration   (CKD-EPI) equation.       Calcium   Date Value Ref Range Status   04/13/2021 9.3 8.5 - 10.1 mg/dL Final     Bilirubin Total   Date Value Ref Range Status   03/13/2020 0.4 0.2 - 1.3 mg/dL Final     Alkaline Phosphatase   Date Value Ref Range Status   03/13/2020 70 40 - 150 U/L Final     ALT   Date Value Ref Range Status   03/13/2020 39 0 - 50 U/L Final     AST   Date Value Ref Range Status   03/13/2020  24 0 - 45 U/L Final

## 2021-04-28 NOTE — PATIENT INSTRUCTIONS
Patient Education     Depression: Tips to Help Yourself    As your healthcare providers help treat your depression, you can also help yourself. Keep in mind that your illness affects you emotionally, physically, mentally, and socially. So full recovery will take time. Take care of your body and your soul, and be patient with yourself as you get better.  Self-care    Educate yourself. Read about treatment and medicine options. If you have the energy, attend local conferences or support groups. Keep a list of useful websites and helpful books and use them as needed. This illness is not your fault. Don t blame yourself for your depression.    Manage early symptoms. If you notice symptoms returning, experience triggers, or identify other factors that may lead to a depressive episode, get help as soon as possible. Ask trusted friends and family to monitor your behavior and let you know if they see anything of concern.    Work with your provider. Find a provider you can trust. Communicate honestly with that person and share information on your treatment for depression and your reaction to medicines.    Be prepared for a crisis. Know what to do if you experience a crisis. Keep the phone number of a crisis hotline and know the location of your community's urgent care centers and the closest emergency department.    Hold off on big decisions. Depression can cloud your judgment. So wait until you feel better before making major life decisions, such as changing jobs, moving, or getting  or .    Be patient. Recovering from depression is a process. Don t be discouraged if it takes some time to feel better.    Keep it simple. Depression saps your energy and concentration. So you won t be able to do all the things you used to do. Set small goals and do what you can.    Be with others. Don t isolate yourself--you ll only feel worse. Try to be with other people. And take part in fun activities when you can. Go to a  movie, ballgame, Taoism service, or social event. Talk openly with people you can trust. And accept help when it s offered.    Take care of your body  People with depression often lose the desire to take care of themselves. That only makes their problems worse. During treatment and afterward, make a point to:    Exercise. It s a great way to take care of your body. And studies have shown that exercise helps fight depression. Aim for 30 minutes of moderate activity a day. Walking in small blocks of time (5-10 minutes) is a good way to start, but anything that gets you moving (gardening, house cleaning) counts.    Don't use drugs and alcohol. These may ease the pain in the short term. But they ll only make your problems worse in the long run.    Get relief from stress. Ask your healthcare provider for relaxation exercises and techniques to help relieve stress. Consider activities like meditation, yoga, or Frantz Chi.    Eat right. A balanced and healthy diet helps keep your body healthy.    Get adequate sleep. Aim for 8 hours per night. Too much or too little sleep can cause other physical and emotional problems.  [employer] last reviewed this educational content on 12/1/2019 2000-2021 [employer]. All rights reserved. This information is not intended as a substitute for professional medical care. Always follow your healthcare professional's instructions.

## 2021-04-28 NOTE — PROGRESS NOTES
{PROVIDER CHARTING PREFERENCE:315444}    Kristine Liang is a 25 year old who presents for the following health issues {ACCOMPANIED BY STATEMENT (Optional):666228}    HPI   {ALERT  Recent PHQ-9 score indicates suicidal ideations :672693}{Provider Documentation  Link to C-SRSS (Brief [place]) Flowsheet :686152}  {SUPERLIST (Optional):057152}  {additonal problems for provider to add (Optional):880718}    Review of Systems   {ROS COMP (Optional):345397}      Objective    There were no vitals taken for this visit.  There is no height or weight on file to calculate BMI.  Physical Exam   {Exam List (Optional):151172}    {Diagnostic Test Results (Optional):473104}    {AMBULATORY ATTESTATION (Optional):230680}

## 2021-04-29 ASSESSMENT — PATIENT HEALTH QUESTIONNAIRE - PHQ9: SUM OF ALL RESPONSES TO PHQ QUESTIONS 1-9: 25

## 2021-04-29 ASSESSMENT — ANXIETY QUESTIONNAIRES: GAD7 TOTAL SCORE: 19

## 2021-06-07 ENCOUNTER — OFFICE VISIT (OUTPATIENT)
Dept: FAMILY MEDICINE | Facility: CLINIC | Age: 26
End: 2021-06-07
Payer: COMMERCIAL

## 2021-06-07 VITALS
WEIGHT: 167 LBS | SYSTOLIC BLOOD PRESSURE: 112 MMHG | TEMPERATURE: 98 F | DIASTOLIC BLOOD PRESSURE: 74 MMHG | HEIGHT: 64 IN | HEART RATE: 97 BPM | BODY MASS INDEX: 28.51 KG/M2 | OXYGEN SATURATION: 98 % | RESPIRATION RATE: 16 BRPM

## 2021-06-07 DIAGNOSIS — J45.41 MODERATE PERSISTENT ASTHMA WITH ACUTE EXACERBATION: Primary | ICD-10-CM

## 2021-06-07 DIAGNOSIS — B07.0 PLANTAR WARTS: ICD-10-CM

## 2021-06-07 PROCEDURE — 90471 IMMUNIZATION ADMIN: CPT | Performed by: FAMILY MEDICINE

## 2021-06-07 PROCEDURE — 99214 OFFICE O/P EST MOD 30 MIN: CPT | Mod: 25 | Performed by: FAMILY MEDICINE

## 2021-06-07 PROCEDURE — 90651 9VHPV VACCINE 2/3 DOSE IM: CPT | Performed by: FAMILY MEDICINE

## 2021-06-07 PROCEDURE — 17110 DESTRUCTION B9 LES UP TO 14: CPT | Performed by: FAMILY MEDICINE

## 2021-06-07 RX ORDER — PREDNISONE 20 MG/1
20 TABLET ORAL DAILY
Qty: 5 TABLET | Refills: 0 | Status: SHIPPED | OUTPATIENT
Start: 2021-06-07 | End: 2021-06-28

## 2021-06-07 RX ORDER — FLUTICASONE PROPIONATE 220 UG/1
2 AEROSOL, METERED RESPIRATORY (INHALATION) 2 TIMES DAILY
Qty: 12 G | Refills: 11 | Status: SHIPPED | OUTPATIENT
Start: 2021-06-07 | End: 2021-12-27

## 2021-06-07 ASSESSMENT — ASTHMA QUESTIONNAIRES
QUESTION_4 LAST FOUR WEEKS HOW OFTEN HAVE YOU USED YOUR RESCUE INHALER OR NEBULIZER MEDICATION (SUCH AS ALBUTEROL): THREE OR MORE TIMES PER DAY
QUESTION_1 LAST FOUR WEEKS HOW MUCH OF THE TIME DID YOUR ASTHMA KEEP YOU FROM GETTING AS MUCH DONE AT WORK, SCHOOL OR AT HOME: MOST OF THE TIME
ACT_TOTALSCORE: 8
QUESTION_5 LAST FOUR WEEKS HOW WOULD YOU RATE YOUR ASTHMA CONTROL: SOMEWHAT CONTROLLED
QUESTION_3 LAST FOUR WEEKS HOW OFTEN DID YOUR ASTHMA SYMPTOMS (WHEEZING, COUGHING, SHORTNESS OF BREATH, CHEST TIGHTNESS OR PAIN) WAKE YOU UP AT NIGHT OR EARLIER THAN USUAL IN THE MORNING: FOUR OR MORE NIGHTS A WEEK
QUESTION_2 LAST FOUR WEEKS HOW OFTEN HAVE YOU HAD SHORTNESS OF BREATH: MORE THAN ONCE A DAY

## 2021-06-07 ASSESSMENT — ENCOUNTER SYMPTOMS
COUGH: 1
SHORTNESS OF BREATH: 1
FEVER: 0
WHEEZING: 1

## 2021-06-07 ASSESSMENT — MIFFLIN-ST. JEOR: SCORE: 1497.51

## 2021-06-07 NOTE — NURSING NOTE
Prior to immunization administration, verified patients identity using patient s name and date of birth. Please see Immunization Activity for additional information.     Screening Questionnaire for Adult Immunization    Are you sick today?   No   Do you have allergies to medications, food, a vaccine component or latex?   No   Have you ever had a serious reaction after receiving a vaccination?   No   Do you have a long-term health problem with heart, lung, kidney, or metabolic disease (e.g., diabetes), asthma, a blood disorder, no spleen, complement component deficiency, a cochlear implant, or a spinal fluid leak?  Are you on long-term aspirin therapy?   No   Do you have cancer, leukemia, HIV/AIDS, or any other immune system problem?   No   Do you have a parent, brother, or sister with an immune system problem?   No   In the past 3 months, have you taken medications that affect  your immune system, such as prednisone, other steroids, or anticancer drugs; drugs for the treatment of rheumatoid arthritis, Crohn s disease, or psoriasis; or have you had radiation treatments?   No   Have you had a seizure, or a brain or other nervous system problem?   No   During the past year, have you received a transfusion of blood or blood    products, or been given immune (gamma) globulin or antiviral drug?   No   For women: Are you pregnant or is there a chance you could become       pregnant during the next month?   No   Have you received any vaccinations in the past 4 weeks?   No     Immunization questionnaire answers were all negative.        Per orders of Dr. Mendoza, injection of HPV 9 given by Toyin Vilchis CMA. Patient instructed to remain in clinic for 15 minutes afterwards, and to report any adverse reaction to me immediately.       Screening performed by Toyin Vilchis CMA on 6/7/2021 at 10:10 AM.

## 2021-06-07 NOTE — PROGRESS NOTES
Assessment & Plan     Moderate persistent asthma with acute exacerbation  Acute problem, currently saturating well on room air.  Recommend escalation of her bronchodilator therapy, start Flovent 2 puffs twice daily as controller medication, albuterol every 4 hours as needed for wheezing.  Given the acute flare, recommend prednisone x5 days.  - fluticasone (FLOVENT HFA) 220 MCG/ACT inhaler  Dispense: 12 g; Refill: 11  - predniSONE (DELTASONE) 20 MG tablet  Dispense: 5 tablet; Refill: 0    Plantar warts  Discussed etiology and natural course of warts as well as risk and benefit of treatment of cutaneous warts and patient desires treatment.  Treated with liquid nitrogen with 3 freeze cycles.  Paring was done prior to liquid nitrogen.  Patient tolerated procedure well.  Advised to follow-up for repeat cryo in 2 to 3 weeks as warts may recur.  Consider repeat cryo and salicylic acid therapy if refractory. May consider treatment with cantharidin, or refer to dermatology if lesion(s) are refractory to treatment. Advised blistering is normal, return to clinic if having fever, pus like drainage or if symptoms not controlled.         Return in about 1 year (around 6/7/2022) for Annual Preventative Visit..    Vincent Mendoza MD  [facility] is a 25 year old who presents for the following health issues     HPI     Asthma Follow-Up    Was ACT completed today?    Yes;  8 am       How many days per week do you miss taking your asthma controller medication?  I do not have an asthma controller medication    Please describe any recent triggers for your asthma: weather, recently had a viral cold    Have you had any Emergency Room Visits, Urgent Care Visits, or Hospital Admissions since your last office visit?  No      How many servings of fruits and vegetables do you eat daily?  0-1    On average, how many sweetened beverages do you drink each day (Examples: soda, juice, sweet tea, etc.   "Do NOT count diet or artificially sweetened beverages)?   3    How many days per week do you exercise enough to make your heart beat faster? 3 or less    How many minutes a day do you exercise enough to make your heart beat faster? 30 - 60    How many days per week do you miss taking your medication? 0    Patient also has a plantar wart on her right foot which has been refractory to Compound W.    Review of Systems   Constitutional: Negative for fever.   Respiratory: Positive for cough, shortness of breath and wheezing.             Objective    /74 (Cuff Size: Adult Regular)   Pulse 97   Temp 98  F (36.7  C)   Resp 16   Ht 1.626 m (5' 4\")   Wt 75.8 kg (167 lb)   SpO2 98%   BMI 28.67 kg/m    Body mass index is 28.67 kg/m .  Physical Exam  Vitals signs reviewed.   Cardiovascular:      Rate and Rhythm: Normal rate and regular rhythm.   Pulmonary:      Effort: Pulmonary effort is normal.      Breath sounds: No wheezing.      Comments: Decreased air entry in the bilateral lower lung fields.   Skin:     Comments: 0.5 verrucae, right lateral border of plantar foot   Neurological:      Mental Status: She is alert.                        "

## 2021-06-08 ASSESSMENT — ASTHMA QUESTIONNAIRES: ACT_TOTALSCORE: 8

## 2021-06-14 ENCOUNTER — VIRTUAL VISIT (OUTPATIENT)
Dept: BEHAVIORAL HEALTH | Facility: CLINIC | Age: 26
End: 2021-06-14
Payer: COMMERCIAL

## 2021-06-14 ENCOUNTER — VIRTUAL VISIT (OUTPATIENT)
Dept: PSYCHIATRY | Facility: CLINIC | Age: 26
End: 2021-06-14
Payer: COMMERCIAL

## 2021-06-14 DIAGNOSIS — F41.1 GENERALIZED ANXIETY DISORDER: ICD-10-CM

## 2021-06-14 DIAGNOSIS — Z72.4 EATING PROBLEM: Primary | ICD-10-CM

## 2021-06-14 DIAGNOSIS — F12.10 CANNABIS ABUSE: ICD-10-CM

## 2021-06-14 DIAGNOSIS — Z72.4 EATING PROBLEM: ICD-10-CM

## 2021-06-14 DIAGNOSIS — F33.2 SEVERE EPISODE OF RECURRENT MAJOR DEPRESSIVE DISORDER, WITHOUT PSYCHOTIC FEATURES (H): Primary | ICD-10-CM

## 2021-06-14 DIAGNOSIS — F33.2 SEVERE EPISODE OF RECURRENT MAJOR DEPRESSIVE DISORDER, WITHOUT PSYCHOTIC FEATURES (H): ICD-10-CM

## 2021-06-14 PROCEDURE — 90791 PSYCH DIAGNOSTIC EVALUATION: CPT | Mod: 52 | Performed by: SOCIAL WORKER

## 2021-06-14 PROCEDURE — 99205 OFFICE O/P NEW HI 60 MIN: CPT | Mod: 95 | Performed by: PSYCHIATRY & NEUROLOGY

## 2021-06-14 RX ORDER — MIRTAZAPINE 15 MG/1
15 TABLET, FILM COATED ORAL AT BEDTIME
Qty: 30 TABLET | Refills: 0 | Status: SHIPPED | OUTPATIENT
Start: 2021-06-14 | End: 2021-12-10

## 2021-06-14 SDOH — HEALTH STABILITY: MENTAL HEALTH: HOW OFTEN DO YOU HAVE A DRINK CONTAINING ALCOHOL?: NOT ASKED

## 2021-06-14 SDOH — HEALTH STABILITY: MENTAL HEALTH: HOW OFTEN DO YOU HAVE 6 OR MORE DRINKS ON ONE OCCASION?: NOT ASKED

## 2021-06-14 SDOH — HEALTH STABILITY: MENTAL HEALTH: HOW MANY STANDARD DRINKS CONTAINING ALCOHOL DO YOU HAVE ON A TYPICAL DAY?: NOT ASKED

## 2021-06-14 ASSESSMENT — COLUMBIA-SUICIDE SEVERITY RATING SCALE - C-SSRS
3. HAVE YOU BEEN THINKING ABOUT HOW YOU MIGHT KILL YOURSELF?: YES
1. IN THE PAST MONTH, HAVE YOU WISHED YOU WERE DEAD OR WISHED YOU COULD GO TO SLEEP AND NOT WAKE UP?: YES
ATTEMPT LIFETIME: YES
LETHALITY/MEDICAL DAMAGE CODE MOST RECENT ACTUAL ATTEMPT: NO PHYSICAL DAMAGE OR VERY MINOR PHYSICAL DAMAGE
1. IN THE PAST MONTH, HAVE YOU WISHED YOU WERE DEAD OR WISHED YOU COULD GO TO SLEEP AND NOT WAKE UP?: YES
2. HAVE YOU ACTUALLY HAD ANY THOUGHTS OF KILLING YOURSELF LIFETIME?: YES
TOTAL  NUMBER OF ABORTED OR SELF INTERRUPTED ATTEMPTS PAST LIFETIME: NO
REASONS FOR IDEATION LIFETIME: MOSTLY TO END OR STOP THE PAIN (YOU COULDN'T GO ON LIVING WITH THE PAIN OR HOW YOU WERE FEELING)
LETHALITY/MEDICAL DAMAGE CODE MOST RECENT POTENTIAL ATTEMPT: BEHAVIOR NOT LIKELY TO RESULT IN INJURY
5. HAVE YOU STARTED TO WORK OUT OR WORKED OUT THE DETAILS OF HOW TO KILL YOURSELF? DO YOU INTEND TO CARRY OUT THIS PLAN?: NO
LETHALITY/MEDICAL DAMAGE CODE MOST LETHAL ACTUAL ATTEMPT: NO PHYSICAL DAMAGE OR VERY MINOR PHYSICAL DAMAGE
4. HAVE YOU HAD THESE THOUGHTS AND HAD SOME INTENTION OF ACTING ON THEM?: YES
TOTAL  NUMBER OF INTERRUPTED ATTEMPTS LIFETIME: NO
ATTEMPT PAST THREE MONTHS: NO
LETHALITY/MEDICAL DAMAGE CODE FIRST ACTUAL ATTEMPT: NO PHYSICAL DAMAGE OR VERY MINOR PHYSICAL DAMAGE
REASONS FOR IDEATION PAST MONTH: MOSTLY TO END OR STOP THE PAIN (YOU COULDN'T GO ON LIVING WITH THE PAIN OR HOW YOU WERE FEELING)
5. HAVE YOU STARTED TO WORK OUT OR WORKED OUT THE DETAILS OF HOW TO KILL YOURSELF? DO YOU INTEND TO CARRY OUT THIS PLAN?: YES
6. HAVE YOU EVER DONE ANYTHING, STARTED TO DO ANYTHING, OR PREPARED TO DO ANYTHING TO END YOUR LIFE?: NO
TOTAL  NUMBER OF ACTUAL ATTEMPTS LIFETIME: 1
4. HAVE YOU HAD THESE THOUGHTS AND HAD SOME INTENTION OF ACTING ON THEM?: NO
6. HAVE YOU EVER DONE ANYTHING, STARTED TO DO ANYTHING, OR PREPARED TO DO ANYTHING TO END YOUR LIFE?: YES
LETHALITY/MEDICAL DAMAGE CODE FIRST PROTENTIAL ATTEMPT: BEHAVIOR NOT LIKELY TO RESULT IN INJURY
TOTAL  NUMBER OF ABORTED OR SELF INTERRUPTED ATTEMPTS PAST 3 MONTHS: NO

## 2021-06-14 NOTE — PROGRESS NOTES
"Telemedicine Visit: The patient's condition can be safely assessed and treated via synchronous audio and visual telemedicine encounter.      Reason for Telemedicine Visit: Services only offered telehealth    Originating Site (Patient Location): Patient's home    Distant Site (Provider Location): Provider Remote Setting    Consent:  The patient/guardian has verbally consented to: the potential risks and benefits of telemedicine (video visit) versus in person care; bill my insurance or make self-payment for services provided; and responsibility for payment of non-covered services.     Mode of Communication:  Video Conference via 3dim    Patient wants a link sent to her cell # at [phone]                                                             Outpatient Psychiatric Evaluation- Standard  Adult    Name:  Azul Rojas  : 1995    Source of Referral:  Primary Care Provider: Vincent Mendoza MD   Current Psychotherapist: None     Identifying Data:  Patient is a 25 year old  female  who presents for initial visit.  Patient attended the session alone. Patient prefers to be called Azul.    My Practice Policy was reviewed.     Chief Complaint:  \"I am having bad eating habits and hurting myself.\"    HPI:  Per DA by Jenny Obregon, LICSW:  Pt referred to [facility] by her PCP due to increased anxiety, depression, and SIB.  Pt shares that she doesn't really want to eat. Initially was trying to lose weight but now feels that she doesn't have an appetite at all and actually would like to eat but can't make herself.  Has been struggling with loss of appetite for about 1 year but does find that smoking marijuana a couple times a day helps with appetite.  Has not gone through an eating disorder program.    Pt shares that she also engages in self harm and often spends time trying to stop self from doing it.  Sometimes finds it easier to engage in self harm and get it over with but does admit that afterwards she " "doesn't feel better.  Used to self harm with \"tools\" but is now scratching self or poking self with her fingernails.    Pt currently Lives with parents but is hoping to move out in a month or so.  She shares that she doesn't have much of a relationship with her parents and keeps to herself when home.  They have horses and pt reports that she used to be very interested in horses but now hardly spends time with them.  She shares that she has a boyfriend she has been dating for about 5 months and is hoping to move out with him soon.  She says that he is her only friend at this time.  Is working full time (usually days) at [employer].    Has passive SI off and on but denies any plan or intent.  Has a hx of a suicide attempt in High School (Overdose) but never told anyone and was never hospitalized.  Pt saw a therapist in High School but reports that it as not a good experience.    Discussed possible DBT or IOP with pt and she was open to the idea and wants to think about it.      Azul reports that she started hurting herself in middle school, and stopped after high school.  She did not receive any treatment at the time.  She started hurting herself again about a year ago.  About a year ago, she also started having trouble eating.  She reports that she was in a toxic relationship.  She lost about 30 pounds over the year, and currently reports weighing 167 pounds.  She is 5 foot 2 or 5 foot 3 inches tall.  She has not had any treatment for an eating disorder.    She reports that she has been anxious and depressed for \"as long as she can remember.\"  Again she reports very little treatment, although she had fluoxetine for time during high school, and does not remember that it helped.  She is currently taking hydroxyzine, which helps with anxiety, she uses it 2 or 3 times per week maximum.  She is also prescribed trazodone, although she is not taking it.  She reports that it helped with sleep but made her groggy and irritable " "the next day so she stopped taking it.    She is currently living with her parents and needs to move out.  She says that she is \"not completely welcome.\"  She hopes to move out within the next month or two.    She has been having trouble graduating from college.  She is working in a 2-year degree, and has been working on it for about 5 years.  She is to the point now that she just needs to complete an internship, but is having trouble getting information from the school.    She has 2 horses, her parents pay for half of their board, but she pays the rest of the expenses.  She reports that she rides about 4 times per week.  She is currently working full-time at [employer], and does pay her parents some rent.    She reports that she uses marijuana most of the day, every day.  She estimates that she smokes marijuana every 2-3 hours.  She also uses an nicotine vaping device.  She denies alcohol use.    Psychiatric Review of Symptoms:  Depression: Azul reports that she has had multiple depressive episodes in her lifetime, most recently 2 months ago for a few weeks.  She endorses being depressed now, and rates her mood as between 3 and 5 on average, although it is up to 8 today, with 10 being the best.  She reports decreased interest, decreased appetite, poor energy, feeling hopeless and guilty, poor concentration, withdrawing from others, crying frequently, and self-harm.  She has thoughts of death and puts herself down, but denies overt suicidal ideation.  She did attempt suicide once in high school by overdose, but did not tell anyone and did not have any treatment.    She endorses symptoms consistent with a diagnosis of dysthymia.  She has been depressed more days than not over 2 years or more with poor appetite, difficulty with sleep, low energy, low self-esteem, poor concentration, and hopelessness.    She sleeps well with marijuana.  She goes to bed between 11 PM and 3 AM and does not have any problems with initial " "insomnia most nights.  She does not usually wake up during the night, and wakes for the day between 8 and 9 AM.  Without marijuana, she does have difficulty sleeping.     Mood rating (1 to 10 with 10 being the best): 8   PHQ-9 scores:   PHQ-9 SCORE 4/13/2021 4/28/2021 6/14/2021   PHQ-9 Total Score - - -   PHQ-9 Total Score MyChart 25 (Severe depression) 25 (Severe depression) 22 (Severe depression)   PHQ-9 Total Score 25 25 22       Irais: No history of manic episodes.   MDQ Score: Not completed    Anxiety: She reports that she worries about \"everything.\"  She feels restless and edgy, has difficulty concentrating, is irritable, has muscle tension, stomachaches and headaches, and sleep disturbance.  She reports that she has a lot of social anxiety, and worries about what others will think of her and whether or not she said or did the right thing.   KARLY-7 scores:    KARLY-7 SCORE 4/13/2021 4/28/2021 6/14/2021   Total Score - - -   Total Score - 19 (severe anxiety) 18 (severe anxiety)   Total Score 19 19 18       Panic: She reports having panic attacks about twice a week with palpitations and shortness of breath.    PTSD: She reports that she was in on \"toxic relationship,\" but that it was \"nothing extreme.  She does have flashbacks and intrusive thoughts on a daily basis or every other day.  She feels guilty.  She feels fearful and ashamed.  She does not feel the world is a safe place.  She is irritable, impulsive, and hypervigilant.    OCD: No history of obsessive thoughts or compulsive behaviors.    Psychosis: No history of auditory or visual hallucinations, paranoid ideations, ideas of reference, or thought insertion or extraction.    Impulse control: No history of gambling, shoplifting, or violent outbursts.    Eating Disorder: No history of binging or purging.she reports she is currently restricting calories, and eats two small meals per day.    Psychiatric History:   No previous psychiatric hospitalizations    No " "history of chemical dependency treatment    Suicide Risk Assessment:  Suicide Attempts: As above, she overdosed in high school, but had no treatment.  Self-injurious Behavior: She reports that she used to hurt herself using \"tools,\" but now she mainly scratches her arms or punctures her skin with her nails.  She pulls out her hair \"by the handful.\"    Past Medical History:  Medical history:   Past Medical History:   Diagnosis Date     Anxiety and depression      Shortness of breath        Surgical history: History reviewed. No pertinent surgical history.    Pregnancy status: Not pregnant    Trauma: She reports she has had multiple concussions from falling off of horses, she estimates between 5 and 10 during her lifetime.    Current Medications:    Current Outpatient Medications:      albuterol (PROAIR HFA/PROVENTIL HFA/VENTOLIN HFA) 108 (90 Base) MCG/ACT inhaler, Inhale 2 puffs into the lungs every 4 hours as needed for shortness of breath / dyspnea or wheezing, Disp: 18 g, Rfl: 11     benzonatate (TESSALON) 100 MG capsule, Take 1 capsule (100 mg) by mouth 3 times daily as needed for cough, Disp: 30 capsule, Rfl: 1     fluticasone (FLOVENT HFA) 220 MCG/ACT inhaler, Inhale 2 puffs into the lungs 2 times daily, Disp: 12 g, Rfl: 11     hydrOXYzine (ATARAX) 25 MG tablet, Take 1 tablet (25 mg) by mouth 3 times daily as needed for anxiety, Disp: 90 tablet, Rfl: 3     ipratropium - albuterol 0.5 mg/2.5 mg/3 mL (DUONEB) 0.5-2.5 (3) MG/3ML neb solution, Take 1 vial (3 mLs) by nebulization every 4 hours as needed for shortness of breath / dyspnea or wheezing, Disp: 3 mL, Rfl: 11     mirtazapine (REMERON) 15 MG tablet, Take 1 tablet (15 mg) by mouth At Bedtime, Disp: 30 tablet, Rfl: 0     norethindrone-ethinyl estradiol (JUNEL FE 1/20) 1-20 MG-MCG tablet, Take 1 tablet by mouth daily, Disp: 84 tablet, Rfl: 3     omeprazole (PRILOSEC) 40 MG DR capsule, Take 1 capsule (40 mg) by mouth daily, Disp: 30 capsule, Rfl: 5     " predniSONE (DELTASONE) 20 MG tablet, Take 1 tablet (20 mg) by mouth daily, Disp: 5 tablet, Rfl: 0    Supplements: Reviewed per Electronic Medical Record Today    The [place] Prescription Monitoring Program has been reviewed and there are no concerns about diversionary activity for controlled substances at this time.  No data for controlled substances over the last one year.     Past medication trials include but are not limited to:   Fluoxetine, trazodone, hydroxyzine    Allergies:  Guaifenesin & derivatives    Vital Signs:  Vitals: No vitals taken for this encounter.    Labs:  Most recent laboratory results reviewed and the pertinent results include:   Lab Results   Component Value Date    WBC 11.5 04/13/2021     Lab Results   Component Value Date    RBC 5.36 04/13/2021     Lab Results   Component Value Date    HGB 15.5 04/13/2021     Lab Results   Component Value Date    HCT 47.5 04/13/2021     No components found for: MCT  Lab Results   Component Value Date    MCV 89 04/13/2021     Lab Results   Component Value Date    MCH 28.9 04/13/2021     Lab Results   Component Value Date    MCHC 32.6 04/13/2021     Lab Results   Component Value Date    RDW 12.8 04/13/2021     Lab Results   Component Value Date     04/13/2021     TSH   Date Value Ref Range Status   04/13/2021 0.76 0.40 - 4.00 mU/L Final     Last Comprehensive Metabolic Panel:  Sodium   Date Value Ref Range Status   04/13/2021 139 133 - 144 mmol/L Final     Potassium   Date Value Ref Range Status   04/13/2021 3.8 3.4 - 5.3 mmol/L Final     Chloride   Date Value Ref Range Status   04/13/2021 109 94 - 109 mmol/L Final     Carbon Dioxide   Date Value Ref Range Status   04/13/2021 28 20 - 32 mmol/L Final     Anion Gap   Date Value Ref Range Status   04/13/2021 2 (L) 3 - 14 mmol/L Final     Glucose   Date Value Ref Range Status   04/13/2021 92 70 - 99 mg/dL Final     Urea Nitrogen   Date Value Ref Range Status   04/13/2021 7 7 - 30 mg/dL Final  "    Creatinine   Date Value Ref Range Status   04/13/2021 0.68 0.52 - 1.04 mg/dL Final     GFR Estimate   Date Value Ref Range Status   04/13/2021 >90 >60 mL/min/[1.73_m2] Final     Comment:     Non  GFR Calc  Starting 12/18/2018, serum creatinine based estimated GFR (eGFR) will be   calculated using the Chronic Kidney Disease Epidemiology Collaboration   (CKD-EPI) equation.       Calcium   Date Value Ref Range Status   04/13/2021 9.3 8.5 - 10.1 mg/dL Final     Ref Range & Units 2mo ago      Phosphorus 2.5 - 4.5 mg/dL 2.9    Resulting [facility]         Specimen Collected: 04/13/21 10:35 AM        Ref Range & Units 2mo ago      Magnesium 1.6 - 2.3 mg/dL 2.4High     Resulting [facility]         Specimen Collected: 04/13/21 10:35 AM          Most recent EKG 3/13/2020     Substance Use History:  Social History     Tobacco Use     Smoking status: Former Smoker     Smokeless tobacco: Former User     Tobacco comment: does vape nicotine   Substance Use Topics     Alcohol use: No     Comment: Doesn't like it     Drug use: Yes     Types: Marijuana     Comment: marijuana daily.      Caffeine: She reports she drinks 1 or 2 energy drinks per day.  Family History:   Patient reported family history includes:   Family History   Problem Relation Age of Onset     Family History Negative Mother      Diabetes Maternal Grandmother      Diabetes Maternal Grandfather        Developmental History:  Not explored    Social History: Per DA by Jenny Obregon [facility]:  Patient reported they grew up in [place], [place].  They were raised by biological parents. Has 1 older sister.  Patient reported that   childhood was \"parents did their best\".  Shares that it was hard to grow up with her sister and she shares that her sisters friends were not nice to her.  Patient denies experiencing childhood abuse/neglect. Patient described their current " "relationships with family of origin as \"not close\" despite living with them.  Gets along better with sister but doesn't talk to her often.  Parents would like her to move out.  Hopes to move out next month and live with boyfriend and his brother.     The patient has been  0 times and has 0 children. Has a boyfriend since around February.  Shares that their relationship is very good and he is her only friend.  Patient reported having few good friends.     Patient reported   highest education level was some college. Pt shares that she was at [employer] when it shut down and transferred to a new school and ended up failing a class and was dropped from the program.  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  The patient did not serve in the .  Patient is currently employed full time and reports they are able to function appropriately at work.. Works at [employer].    Patient reported that they have not been involved with the legal system.   Patient denies being on probation / parole / under the jurisdiction of the court.    Mental Status Examination:     Azul is a 25-year-old woman who appears her stated age and in no acute distress.  Speech is clear and normal in rate and tone.  Eye contact is good over the video connection.  Motor behavior is appropriate.  Affect is blunted.  Mood is dysphoric and anxious.  Thoughts are logical and spontaneous with no loose associations or flight of ideas.  Thought content shows no psychosis.  She denies suicidal thoughts, but does state that she has some thoughts about death and frequently puts herself down.    Sensorium is clear.  She is oriented to person, place, and time.  Memory appears to be intact for immediate, recent, and remote events.  Intelligence is estimated to be average.  Abstractive ability appears to be intact.  Attention and concentration were good during this interview.  Personal insight is fair.  Personal judgment is fair.  " Impersonal judgment appears to be intact.    Assessment and Plan:  Azul is a 25-year-old woman who has had minimal psychiatric care over the years.  She has a long history of anxiety and depression with self-harm since middle school, and an overdose attempt in high school.  Unfortunately, she did not receive treatment at that time.  About a year ago, she became more depressed and started harming herself again.  She went on a diet, and subsequently developed problems eating.  She uses marijuana on a daily basis.      ICD-10-CM    1. Severe episode of recurrent major depressive disorder, without psychotic features (H)  F33.2    2. Eating problem  Z72.4 **CBC with platelets FUTURE anytime     **Comprehensive metabolic panel FUTURE anytime     Hepatic panel (Albumin, ALT, AST, Bili, Alk Phos, TP)     mirtazapine (REMERON) 15 MG tablet   3. Generalized anxiety disorder  F41.1    4. Cannabis abuse  F12.10        Medical Comorbidities Include: See above    Patient Strengths:   Azul  identified the following strengths: exercise routine and positive work environment.     Treatment Plan:  Azul's main concern today is her fo eating habits.  She was open to being referred to an eating disorder clinic for an assessment.  She will contact either the [facility] or [facility] on her own.    We also agreed to a trial of mirtazapine 15 mg daily to help with anxiety and sleep.      She will decrease her marijuana use as tolerated.    Patient Instructions   Contact an eating disorder clinic for an assessment.  Jenny will check in with you in about a week to offer assistance if needed.    Start mirtazapine 15 mg at bedtime.    Stop trazodone due to adverse side effects.    Decrease marijuana use as tolerated.    Continue all other medications per your primary care provider.    You are being transferred to the care of a psychiatrist through an eating disorder program.  Please feel free to reschedule with me in the future if  needed.    [facility] Resources:      Go to the Emergency Department as needed or call after hours crisis line at [phone].      To schedule individual or family therapy, call [facility] at [phone].     Follow up with primary care provider as planned or sooner for acute medical concerns.    Call the psychiatric nurse line with medication questions or concerns at [phone].    MyChart may be used to communicate with your provider, but this is not intended to be used for emergencies.    Community Resources:      National Suicide Prevention Lifeline: 543.192.4324 (TTY: 900.397.9260). Call anytime for help.  (www.suicidepreventionlifeline.org)    National [place] on Mental Illness (www.katey.org): 334.812.3319 or 349-213-5795.     Mental Health Association (www.mentalhealth.org): 886.981.2967 or 627-228-5406.    [place] Crisis Text Line: Text [place] to 228776    Suicide LifeLine Chat: suicidepreStorematesline.org/chat         Patient Status:  The patient is being referred to long term community psychiatry care and provider will provide bridging until patient is established with new community provider.     Video call duration: 45 minutes  Total time spent, including chart review documentation: 80 minutes      As the provider I attest to compliance with applicable laws and regulations related to telemedicine.

## 2021-06-14 NOTE — PROGRESS NOTES
PATIENT'S NAME: Azul Rojas  PREFERRED NAME: Azul  PREFERRED PRONOUNS:    MRN:   [id number]  :   1995   ACCT. NUMBER: [id number]  DATE OF SERVICE: 21  START TIME: 1:19p  END TIME: 2:01pm    BRIEF ADULT DIAGNOSTIC ASSESSMENT    Telemedicine Visit: The patient's condition can be safely assessed and treated via synchronous audio and visual telemedicine encounter.      Reason for Telemedicine Visit: Services only offered telehealth    Originating Site (Patient Location): Patient's home    Distant Site (Provider Location): Provider Remote Setting    Consent:  The patient/guardian has verbally consented to: the potential risks and benefits of telemedicine (video visit) versus in person care; bill my insurance or make self-payment for services provided; and responsibility for payment of non-covered services.     Mode of Communication:  Video Conference via Ubiregi    As the provider I attest to compliance with applicable laws and regulations related to telemedicine.    Identifying Information:  Patient is a 25 year old, .  The pronoun use throughout this assessment reflects the patient's chosen pronoun.  Patient was referred for an assessment by primary care provider.  Patient attended the session alone.     Chief Complaint:   Pt referred to [facility] by her PCP due to increased anxiety, depression, and SIB.  Pt shares that she doesn't really want to eat. Initially was trying to lose weight but now feels that she doesn't have an appetite at all and actually would like to eat but can't make herself.  Has been struggling with loss of appetite for about 1 year but does find that smoking marijuana a couple times a day helps with appetite.  Has not gone through an eating disorder program.    Pt shares that she also engages in self harm and often spends time trying to stop self from doing it.  Sometimes finds it easier to engage in self harm and get it over with but does admit that afterwards she  "doesn't feel better.  Used to self harm with \"tools\" but is now scratching self or poking self with her fingernails.    Pt currently Lives with parents but is hoping to move out in a month or so.  She shares that she doesn't have much of a relationship with her parents and keeps to herself when home.  They have horses and pt reports that she used to be very interested in horses but now hardly spends time with them.  She shares that she has a boyfriend she has been dating for about 5 months and is hoping to move out with him soon.  She says that he is her only friend at this time.  Is working full time (usually days) at [employer].    Has passive SI off and on but denies any plan or intent.  Has a hx of a suicide attempt in High School (Overdose) but never told anyone and was never hospitalized.  Pt saw a therapist in High School but reports that it as not a good experience.    Discussed possible DBT or IOP with pt and she was open to the idea and wants to think about it.      Does the client have any condition that is currently presenting as a potential to harm themselves or others (severe withdrawal, serious medical condition, severe emotional/behavioral problem)? No.  Proceed with assessment.    Review of Symptoms per patient report:  Depression: Lack of interest, Excessive or inappropriate guilt, Change in energy level, Difficulties concentrating, Change in appetite, Feelings of hopelessness, Low self-worth, Irritability, Feeling sad, down, or depressed, Withdrawn, Frequent crying and Self-injurious behavior  Irais:  No Symptoms  Psychosis: No Symptoms  Anxiety: Excessive worry, Nervousness, Physical complaints, such as headaches, stomachaches, muscle tension, Social anxiety and Sleep disturbance  Panic:  Palpitations and Shortness of breath- When gets really anxious.  Maybe a couple of times a week.  Post Traumatic Stress Disorder:  No Symptoms   Eating Disorder: Restriction  ADD / ADHD:  Distractibility and " Restlessness/fidgety  Conduct Disorder: No symptoms  Autism Spectrum Disorder: No symptoms  Obsessive Compulsive Disorder: No Symptoms    Sleep:  Sleeps pretty good.  If doesn't have marijuana doesn't sleep well.  Gets about 8 hours of sleep.    Caffeine: drinks 1-2 energy drinks a day  Tobacco:    Current alcohol use: none  Current drug use: marijuana multiple times a day    Rating Scales:  PHQ-9:   [facility] Follow-up to PHQ 4/13/2021 4/28/2021 6/14/2021   PHQ-9 9. Suicide Ideation past 2 weeks Nearly every day Nearly every day More than half the days   Thoughts of suicide or self harm in past 2 weeks Yes Yes Yes   Thoughts of suicide or self harm in past 2 weeks Yes Yes Yes   PHQ-9 Self harm plan? No No Yes   PHQ-9 Self harm action? No No No   PHQ-9 Safety concerns? No No No   PHQ-9 Self harm plan? No No Yes   PHQ-9 Self harm action? No No No   PHQ-9 Safety concerns? No No No      GAD7:    KARLY-7 SCORE 4/13/2021 4/28/2021 6/14/2021   Total Score - - -   Total Score - 19 (severe anxiety) 18 (severe anxiety)   Total Score 19 19 18     CGI:  First:  No data recorded  Most recent:  No data recorded    WHODAS:   WHODAS 2.0 Total Score 6/14/2021   Total Score 18   Total Score MyChart 18        AUDIT  No flowsheet data found.    Personal Medical History:  Past Medical History:   Diagnosis Date     Anxiety and depression      Shortness of breath        Patient has received mental health services in the past: therapy with a therapist in High School.  Psychiatric Hospitalizations: None.  Patient denies a history of civil commitment. Currently, patient is not receiving other mental health services.  These include none.     Patient does report a history of head injury / trauma / cognitive impairment / seizures.  Has fallen off horses a lot.  Never been treated for a concussion.    Current Medications:  Current Outpatient Medications   Medication Sig Dispense Refill     albuterol (PROAIR HFA/PROVENTIL HFA/VENTOLIN HFA) 108 (90 Base)  "MCG/ACT inhaler Inhale 2 puffs into the lungs every 4 hours as needed for shortness of breath / dyspnea or wheezing 18 g 11     benzonatate (TESSALON) 100 MG capsule Take 1 capsule (100 mg) by mouth 3 times daily as needed for cough 30 capsule 1     fluticasone (FLOVENT HFA) 220 MCG/ACT inhaler Inhale 2 puffs into the lungs 2 times daily 12 g 11     hydrOXYzine (ATARAX) 25 MG tablet Take 1 tablet (25 mg) by mouth 3 times daily as needed for anxiety 90 tablet 3     ipratropium - albuterol 0.5 mg/2.5 mg/3 mL (DUONEB) 0.5-2.5 (3) MG/3ML neb solution Take 1 vial (3 mLs) by nebulization every 4 hours as needed for shortness of breath / dyspnea or wheezing 3 mL 11     norethindrone-ethinyl estradiol (JUNEL FE 1/20) 1-20 MG-MCG tablet Take 1 tablet by mouth daily 84 tablet 3     omeprazole (PRILOSEC) 40 MG DR capsule Take 1 capsule (40 mg) by mouth daily 30 capsule 5     predniSONE (DELTASONE) 20 MG tablet Take 1 tablet (20 mg) by mouth daily 5 tablet 0     traZODone (DESYREL) 50 MG tablet Take 1 tablet (50 mg) by mouth At Bedtime (Patient not taking: Reported on 6/14/2021) 90 tablet 1        Allergies:  No Known Allergies    Family Psychiatric History:  Patient did not report a family history of mental health concerns. Doesn't have a close relationship with family.    Family History     Problem (# of Occurrences) Relation (Name,Age of Onset)    Diabetes (2) Maternal Grandmother, Maternal Grandfather    Family History Negative (1) Mother          Social/Family History:  Patient reported they grew up in [place], [place].  They were raised by biological parents. Has 1 older sister.  Patient reported that   childhood was \"parents did their best\".  Shares that it was hard to grow up with her sister and she shares that her sisters friends were not nice to her.  Patient denies experiencing childhood abuse/neglect. Patient described their current relationships with family of origin as \"not close\" despite living with them.  Gets " along better with sister but doesn't talk to her often.  Parents would like her to move out.  Hopes to move out next month and live with boyfriend and his brother.        The patient has been  0 times and has 0 children.   Has a boyfriend since around February.  Shares that their relationship is very good and he is her only friend.  Patient reported having few good friends.     Cultural influences and impact on patient's life structure, values, norms, and healthcare: denies. Patient identified their preferred language to be English. Patient reported they does not need the assistance of an  or other support involved in treatment.       Educational/Occupational History:  Patient reported   highest education level was some college. Pt shares that she was at [employer] when it shut down and transferred to a new school and ended up failing a class and was dropped from the program.  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  The patient did not serve in the .  Patient is currently employed full time and reports they are able to function appropriately at work.. Works at [employer].      Social History     Socioeconomic History     Marital status: Single     Spouse name: Not on file     Number of children: Not on file     Years of education: Not on file     Highest education level: Not on file   Occupational History     Not on file   Social Needs     Financial resource strain: Not on file     Food insecurity     Worry: Not on file     Inability: Not on file     Transportation needs     Medical: Not on file     Non-medical: Not on file   Tobacco Use     Smoking status: Former Smoker     Smokeless tobacco: Former User   Substance and Sexual Activity     Alcohol use: No     Drug use: Yes     Types: Marijuana     Sexual activity: Yes     Partners: Male     Birth control/protection: Pill   Lifestyle     Physical activity     Days per week: Not on file     Minutes per session: Not on  file     Stress: Not on file   Relationships     Social connections     Talks on phone: Not on file     Gets together: Not on file     Attends Confucianism service: Not on file     Active member of club or organization: Not on file     Attends meetings of clubs or organizations: Not on file     Relationship status: Not on file     Intimate partner violence     Fear of current or ex partner: Not on file     Emotionally abused: Not on file     Physically abused: Not on file     Forced sexual activity: Not on file   Other Topics Concern     Parent/sibling w/ CABG, MI or angioplasty before 65F 55M? Not Asked   Social History Narrative     Not on file       Patient reported that they have not been involved with the legal system.   Patient denies being on probation / parole / under the jurisdiction of the court.    Current Mental Status Exam:   Appearance:   Appropriate    Eye Contact:   Good   Psychomotor:   Normal   Attitude / Demeanor:  Cooperative   Speech      Rate / Production:  Normal/ Responsive      Volume:   Normal  volume      Language:   intact  Mood:    Normal  Affect:    Appropriate    Thought Content:  Clear   Thought Process:  Coherent       Associations:  No loosening of associations  Insight:    Good   Judgment:   Intact   Orientation:   All  Attention/concentration: Good      Safety Assessment:   Current Safety Concerns:  [place] Suicide Severity Rating Scale (Lifetime/Recent)  [place] Suicide Severity Rating (Lifetime/Recent) 6/14/2021   1. Wish to be Dead (Lifetime) Yes   1. Wish to be Dead (Recent) Yes   2. Non-Specific Active Suicidal Thoughts (Lifetime) Yes   3. Active Suicidal Ideation with any Methods (Not Plan) Without Intent to Act (Lifetime) Yes   3. Active Suicidal Ideation with any Methods (Not Plan) Without Intent to Act (Recent) No   4. Active Suicidal Ideation with Some Intent to Act, Without Specific Plan (Lifetime) Yes   4. Active Suicidal Ideation with Some Intent to Act, Without  "Specific Plan (Recent) No   5. Active Suicidal Ideation with Specific Plan and Intent (Lifetime) Yes   5. Active Suicidal Ideation with Specific Plan and Intent (Recent) No   Most Severe Ideation Rating (Lifetime) 5   Frequency (Lifetime) 4   Duration (Lifetime) 2   Controllability (Lifetime) 1   Protective Factors  (Lifetime) 1   Reasons for Ideation (Lifetime) 4   Most Severe Ideation Rating (Past Month) 3   Frequency (Past Month) 4   Duration (Past Month) 2   Controllability (Past Month) 1   Protective Factors (Past Month) 2   Reasons for Ideation (Past Month) 4   Actual Attempt (Lifetime) Yes   Actual Attempt Description (Lifetime) Overdose in High School.   Total Number of Actual Attempts (Lifetime) 1   Actual Attempt (Past 3 Months) No   Has subject engaged in non-suicidal self-injurious behavior? (Lifetime) Yes   Has subject engaged in non-suicidal self-injurious behavior? (Past 3 Months) Yes   Interrupted Attempts (Lifetime) No   Aborted or Self-Interrupted Attempt (Lifetime) No   Aborted or Self-Interrupted Attempt (Past 3 Months) No   Preparatory Acts or Behavior (Lifetime) Yes   Preparatory Acts or Behavior (Past 3 Months) No   Most Recent Attempt Actual Lethality Code 0   Most Recent Attempt Potential Lethality Code 0   Most Lethal Attempt Actual Lethality Code 0   Most Lethal Attemplt Potential Lethality Code 0   Initial/First Attempt Actual Lethality Code 0     Patient denies current homicidal ideation and behaviors.  Patient reports current self-injurious ideation.  Onset: initially in Middle School.  started again about 1 year ago, frequency: most days, duration: varies.  tries to stop self, intensity: less intense than in Middle School.  Doesn't use \"tools\", just her nails..  Client reports they are currently engaging in self-injurious behavior.  Self-injurious behaviors include: scratching.  Frequency of self-injurious behaviors: most days..  Patient denied risk behaviors associated with substance " use.  Patient denies any high risk behaviors associated with mental health symptoms.  Patient reports the following current concerns for their personal safety: None.  Patient reports there are no firearms in the house. n/a.     History of Safety Concerns:  Patient denied a history of homicidal ideation.     Patient denied a history of personal safety concerns.    Patient denied a history of assaultive behaviors.    Patient denied a history of sexual assault behaviors.     Patient denied a history of risk behaviors associated with substance use.  Patient denies any history of high risk behaviors associated with mental health symptoms.  Patient reports the following protective factors: positive relationships positive social network, adherence with prescribed medication, living with other people and daily obligations    Risk Plan:  See Preliminary Treatment Plan for Safety and Risk Management Plan    Diagnosis:  Diagnostic Criteria:   A. Excessive anxiety and worry about a number of events or activities (such as work or school performance).   B. The person finds it difficult to control the worry.  C. Select 3 or more symptoms (required for diagnosis). Only one item is required in children.   - Restlessness or feeling keyed up or on edge.    - Difficulty concentrating or mind going blank.    - Muscle tension.   D. The focus of the anxiety and worry is not confined to features of an Axis I disorder.  E. The anxiety, worry, or physical symptoms cause clinically significant distress or impairment in social, occupational, or other important areas of functioning.   F. The disturbance is not due to the direct physiological effects of a substance (e.g., a drug of abuse, a medication) or a general medical condition (e.g., hyperthyroidism) and does not occur exclusively during a Mood Disorder, a Psychotic Disorder, or a Pervasive Developmental Disorder.  CRITERIA (A-C) REPRESENT A MAJOR DEPRESSIVE EPISODE - SELECT THESE  CRITERIA  A) Recurrent episode(s) - symptoms have been present during the same 2-week period and represent a change from previous functioning 5 or more symptoms (required for diagnosis)   - Depressed mood. Note: In children and adolescents, can be irritable mood.     - Diminished interest or pleasure in all, or almost all, activities.    - Significant weight loss when not dieting decrease in appetite.    - Psychomotor activity retardation.    - Fatigue or loss of energy.    - Feelings of worthlessness or excessive guilt.   B) The symptoms cause clinically significant distress or impairment in social, occupational, or other important areas of functioning  C) The episode is not attributable to the physiological effects of a substance or to another medical condition  D) The occurence of major depressive episode is not better explained by other thought / psychotic disorders  E) There has never been a manic episode or hypomanic episode  Rule Out Anorexia      Patient's Strengths and Limitations:  Patient identified the following strengths or resources that will help them succeed in treatment: friends / good social support and positive work environment. Things that may interfere with the patient's success in treatment include: few friends and lack of family support.     Recommendations:     1. Plan for Safety and Risk Management:Recommended that patient call 911 or go to the local ED should there be a change in any of these risk factors..  Report to child / adult protection services was n/a.      2. Resources/Service Plan:       services are not indicated.     Modifications to assist communication are not indicated.     Additional disability accommodations are not indicated.      3. Collaboration:  Collaboration / coordination of treatment will be initiated with the following support professionals: psychiatry.      4.  Referrals:   The following referral(s) will be initiated: Eating disorder treatment.. Pt  plans to look at programs through [facility] or [facility].       Staff Name/Credentials:  Jenny Obregon on 6/15/2021 at 2:28 PM    June 14, 2021

## 2021-06-14 NOTE — Clinical Note
Vincent Archer,    I met with Azul today.  Thanks for doing her labs before she saw me!  I think she needs to start with an eating disorder treatment.  If they do not think she is appropriate, she can return to me and we will treat her depression and anxiety.  She will probably benefit from DBT for distress tolerance given her history of self-harm and thoughts of death.    Please feel free to contact me with questions or concerns.  Thank you for the opportunity to be involved in the care of this patient.    Regards,  Mita Maddox MD  [facility] Psychiatry  [facility]

## 2021-06-15 NOTE — PATIENT INSTRUCTIONS
Contact an eating disorder clinic for an assessment.  Jenny will check in with you in about a week to offer assistance if needed.    Start mirtazapine 15 mg at bedtime.    Stop trazodone due to adverse side effects.    Decrease marijuana use as tolerated.    Continue all other medications per your primary care provider.    You are being transferred to the care of a psychiatrist through an eating disorder program.  Please feel free to reschedule with me in the future if needed.    [facility] Resources:      Go to the Emergency Department as needed or call after hours crisis line at [phone].      To schedule individual or family therapy, call [facility] at [phone].     Follow up with primary care provider as planned or sooner for acute medical concerns.    Call the psychiatric nurse line with medication questions or concerns at [phone].    NextIOt may be used to communicate with your provider, but this is not intended to be used for emergencies.    Community Resources:      National Suicide Prevention Lifeline: 635.647.1802 (TTY: 820.828.2434). Call anytime for help.  (www.suicidepreventionlifeline.org)    National [employer] on Mental Illness (www.katey.org): 601.773.9033 or 495-513-5279.     Mental Health Association (www.mentalhealth.org): 492.455.6379 or 122-331-5126.    [place] Crisis Text Line: Text [place] to 754836    Suicide LifeLine Chat: suicidepreJukedeckline.org/chat

## 2021-06-28 ENCOUNTER — OFFICE VISIT (OUTPATIENT)
Dept: FAMILY MEDICINE | Facility: CLINIC | Age: 26
End: 2021-06-28
Payer: COMMERCIAL

## 2021-06-28 VITALS
OXYGEN SATURATION: 100 % | DIASTOLIC BLOOD PRESSURE: 68 MMHG | WEIGHT: 169.8 LBS | TEMPERATURE: 97.5 F | BODY MASS INDEX: 29.15 KG/M2 | RESPIRATION RATE: 18 BRPM | SYSTOLIC BLOOD PRESSURE: 116 MMHG | HEART RATE: 91 BPM

## 2021-06-28 DIAGNOSIS — J45.41 MODERATE PERSISTENT ASTHMA WITH ACUTE EXACERBATION: Primary | ICD-10-CM

## 2021-06-28 DIAGNOSIS — R05.9 COUGH: ICD-10-CM

## 2021-06-28 DIAGNOSIS — R09.81 SINUS CONGESTION: ICD-10-CM

## 2021-06-28 DIAGNOSIS — B07.0 PLANTAR WARTS: ICD-10-CM

## 2021-06-28 PROCEDURE — 99214 OFFICE O/P EST MOD 30 MIN: CPT | Mod: 25 | Performed by: FAMILY MEDICINE

## 2021-06-28 PROCEDURE — 17110 DESTRUCTION B9 LES UP TO 14: CPT | Performed by: FAMILY MEDICINE

## 2021-06-28 RX ORDER — BENZONATATE 100 MG/1
100 CAPSULE ORAL 3 TIMES DAILY PRN
Qty: 30 CAPSULE | Refills: 3 | Status: SHIPPED | OUTPATIENT
Start: 2021-06-28 | End: 2021-10-15

## 2021-06-28 RX ORDER — PREDNISONE 50 MG/1
50 TABLET ORAL DAILY
Qty: 5 TABLET | Refills: 0 | Status: SHIPPED | OUTPATIENT
Start: 2021-06-28 | End: 2021-10-15

## 2021-06-28 RX ORDER — FLUTICASONE PROPIONATE 50 MCG
1 SPRAY, SUSPENSION (ML) NASAL DAILY
Qty: 16 G | Refills: 3 | Status: SHIPPED | OUTPATIENT
Start: 2021-06-28 | End: 2021-12-10

## 2021-06-28 ASSESSMENT — ENCOUNTER SYMPTOMS
SHORTNESS OF BREATH: 1
WHEEZING: 1
FEVER: 0
SINUS PRESSURE: 1
COUGH: 1

## 2021-06-28 ASSESSMENT — PATIENT HEALTH QUESTIONNAIRE - PHQ9
SUM OF ALL RESPONSES TO PHQ QUESTIONS 1-9: 21
SUM OF ALL RESPONSES TO PHQ QUESTIONS 1-9: 21
10. IF YOU CHECKED OFF ANY PROBLEMS, HOW DIFFICULT HAVE THESE PROBLEMS MADE IT FOR YOU TO DO YOUR WORK, TAKE CARE OF THINGS AT HOME, OR GET ALONG WITH OTHER PEOPLE: SOMEWHAT DIFFICULT

## 2021-06-28 NOTE — LETTER
My Asthma Action Plan    Name: Azul Rojas   YOB: 1995  Date: 6/28/2021   My doctor: Vincent Mendoza MD   My clinic: [facility]        My Control Medicine: Fluticasone propionate (Flovent HFA) - 220 mcg 2 Puffs Twice Daily  My Rescue Medicine: Albuterol (Proair/Ventolin/Proventil HFA) 2-4 puffs EVERY 4 HOURS as needed. Use a spacer if recommended by your provider.  My Oral Steroid Medicine: 40mg prednisone My Asthma Severity:   Moderate Persistent  Know your asthma triggers: smoking, cold, hot weather, cold             GREEN ZONE   Good Control    I feel good    No cough or wheeze    Can work, sleep and play without asthma symptoms       Take your asthma control medicine every day.     1. If exercise triggers your asthma, take your rescue medication    15 minutes before exercise or sports, and    During exercise if you have asthma symptoms  2. Spacer to use with inhaler: If you have a spacer, make sure to use it with your inhaler             YELLOW ZONE Getting Worse  I have ANY of these:    I do not feel good    Cough or wheeze    Chest feels tight    Wake up at night   1. Keep taking your Green Zone medications  2. Start taking your rescue medicine:    every 20 minutes for up to 1 hour. Then every 4 hours for 24-48 hours.  3. If you stay in the Yellow Zone for more than 12-24 hours, contact your doctor.  4. If you do not return to the Green Zone in 12-24 hours or you get worse, start taking your oral steroid medicine if prescribed by your provider.           RED ZONE Medical Alert - Get Help  I have ANY of these:    I feel awful    Medicine is not helping    Breathing getting harder    Trouble walking or talking    Nose opens wide to breathe       1. Take your rescue medicine NOW  2. If your provider has prescribed an oral steroid medicine, start taking it NOW  3. Call your doctor NOW  4. If you are still in the Red Zone after 20 minutes and you have not reached your  doctor:    Take your rescue medicine again and    Call 911 or go to the emergency room right away    See your regular doctor within 2 weeks of an Emergency Room or Urgent Care visit for follow-up treatment.          Annual Reminders:  Meet with Asthma Educator,  Flu Shot in the Fall, consider Pneumonia Vaccination for patients with asthma (aged 19 and older).    Pharmacy: [id number] [place], [place] - [address]    Electronically signed by Vincent Mendoza MD   Date: 06/28/21                      Asthma Triggers  How To Control Things That Make Your Asthma Worse    Triggers are things that make your asthma worse.  Look at the list below to help you find your triggers and what you can do about them.  You can help prevent asthma flare-ups by staying away from your triggers.      Trigger                                                          What you can do   Cigarette Smoke  Tobacco smoke can make asthma worse. Do not allow smoking in your home, car or around you.  Be sure no one smokes at a child s day care or school.  If you smoke, ask your health care provider for ways to help you quit.  Ask family members to quit too.  Ask your health care provider for a referral to Quit Plan to help you quit smoking, or call 6-559-606-PLAN.     Colds, Flu, Bronchitis  These are common triggers of asthma. Wash your hands often.  Don t touch your eyes, nose or mouth.  Get a flu shot every year.     Dust Mites  These are tiny bugs that live in cloth or carpet. They are too small to see. Wash sheets and blankets in hot water every week.   Encase pillows and mattress in dust mite proof covers.  Avoid having carpet if you can. If you have carpet, vacuum weekly.   Use a dust mask and HEPA vacuum.   Pollen and Outdoor Mold  Some people are allergic to trees, grass, or weed pollen, or molds. Try to keep your windows closed.  Limit time out doors when pollen count is high.   Ask you health care provider about taking  medicine during allergy season.     Animal Dander  Some people are allergic to skin flakes, urine or saliva from pets with fur or feathers. Keep pets with fur or feathers out of your home.    If you can t keep the pet outdoors, then keep the pet out of your bedroom.  Keep the bedroom door closed.  Keep pets off cloth furniture and away from stuffed toys.     Mice, Rats, and Cockroaches   Some people are allergic to the waste from these pests.   Cover food and garbage.  Clean up spills and food crumbs.  Store grease in the refrigerator.   Keep food out of the bedroom.   Indoor Mold  This can be a trigger if your home has high moisture. Fix leaking faucets, pipes, or other sources of water.   Clean moldy surfaces.  Dehumidify basement if it is damp and smelly.   Smoke, Strong Odors, and Sprays  These can reduce air quality. Stay away from strong odors and sprays, such as perfume, powder, hair spray, paints, smoke incense, paint, cleaning products, candles and new carpet.   Exercise or Sports  Some people with asthma have this trigger. Be active!  Ask your doctor about taking medicine before sports or exercise to prevent symptoms.    Warm up for 5-10 minutes before and after sports or exercise.     Other Triggers of Asthma  Cold air:  Cover your nose and mouth with a scarf.  Sometimes laughing or crying can be a trigger.  Some medicines and food can trigger asthma.

## 2021-06-28 NOTE — PROGRESS NOTES
Assessment & Plan     Moderate persistent asthma with acute exacerbation  Acute problem.  Start prednisone burst.  Given overall uncontrolled daily symptoms, escalate Flovent to Advair, continue albuterol every 4 hours as needed for shortness of breath.  ACT updated, asthma action plan updated and given to patient. Recommend nicotine/vape and marijuana cessation. Return if worse in the next 3 days.   - fluticasone-salmeterol (ADVAIR) 250-50 MCG/DOSE inhaler  Dispense: 60 each; Refill: 3  - predniSONE (DELTASONE) 50 MG tablet  Dispense: 5 tablet; Refill: 0    Sinus congestion  - fluticasone (FLONASE) 50 MCG/ACT nasal spray  Dispense: 16 g; Refill: 3    Cough  - benzonatate (TESSALON) 100 MG capsule  Dispense: 30 capsule; Refill: 3    Plantar warts  Discussed etiology and natural course of warts as well as risk and benefit of treatment of cutaneous warts and patient desires treatment.  Treated with liquid nitrogen with 3 freeze cycles.  Paring was done prior to liquid nitrogen.  Patient tolerated procedure well.  Advised to follow-up for repeat cryo in 2 to 3 weeks as warts may recur.  Consider repeat cryo and salicylic acid therapy if refractory. May consider treatment with cantharidin, or refer to dermatology if lesion(s) are refractory to treatment. Advised blistering is normal, return to clinic if having fever, pus like drainage or if symptoms not controlled.   - DESTRUCT BENIGN LESION, UP TO 14     :[id number]}  Depression Screening Follow Up    PHQ 6/28/2021   PHQ-9 Total Score 21   Q9: Thoughts of better off dead/self-harm past 2 weeks Several days   F/U: Thoughts of suicide or self-harm No   F/U: Self harm-plan -   F/U: Self-harm action -   F/U: Safety concerns No         Return in about 1 month (around 7/28/2021) for if plantar wart isn't improved, patient will schedule.    Vincent Mendoza MD  [facility]    Kristine Liang is a 25 year old who presents for the following health issues      HPI     Asthma Follow-Up    Was ACT completed today?    Yes    ACT Total Scores 6/28/2021   ACT TOTAL SCORE (Goal Greater than or Equal to 20) 9   In the past 12 months, how many times did you visit the emergency room for your asthma without being admitted to the hospital? 0   In the past 12 months, how many times were you hospitalized overnight because of your asthma? 0         How many days per week do you miss taking your asthma controller medication?  1    Please describe any recent triggers for your asthma: animal dander, humidity and heat    Have you had any Emergency Room Visits, Urgent Care Visits, or Hospital Admissions since your last office visit?  No      She will also like a plantar wart removed.      Review of Systems   Constitutional: Negative for fever.   HENT: Positive for sinus pressure.    Respiratory: Positive for cough, shortness of breath and wheezing.             Objective    /68   Pulse 91   Temp 97.5  F (36.4  C) (Tympanic)   Resp 18   Wt 77 kg (169 lb 12.8 oz)   SpO2 100%   BMI 29.15 kg/m    Body mass index is 29.15 kg/m .  Physical Exam  Vitals signs reviewed.   Constitutional:       Appearance: She is not ill-appearing.   HENT:      Nose: Rhinorrhea present.   Cardiovascular:      Rate and Rhythm: Normal rate and regular rhythm.   Pulmonary:      Breath sounds: Wheezing (diffuse expiratory) present.   Musculoskeletal:        Feet:    Feet:      Comments: 1 cm hyperkeratotic lesion on left plantar foot            Answers for HPI/ROS submitted by the patient on 6/28/2021   If you checked off any problems, how difficult have these problems made it for you to do your work, take care of things at home, or get along with other people?: Somewhat difficult  PHQ9 TOTAL SCORE: 21

## 2021-06-28 NOTE — PATIENT INSTRUCTIONS
Patient Education     Asthma (Adult)   Asthma is a disease where the medium and small air passages in the lung go into spasm and restrict air flow. Inflammation and swelling of the airways cause further blockage. During an acute asthma attack, these factors cause trouble breathing, wheezing, cough, and chest tightness.     An asthma attack can be triggered by many things. Common triggers include infections such as the common cold, bronchitis, and pneumonia. Irritants such as smoke or pollutants in the air, very cold air, emotional upset, and exercise can also trigger an attack. In many adults with asthma, allergies to dust, mold, pollen, and animal dander can cause an asthma attack. Skipping doses of daily asthma medicine can also bring on an asthma attack.   Asthma can be controlled using the correct medicines prescribed by your healthcare provider and staying away from known triggers including allergens and irritants.   Home care    Take prescribed medicine exactly at the times advised. If you need medicine such as from a handheld inhaler or aerosol breathing machine more than every 4 hours, contact your healthcare provider or get medical care right away. If you are prescribed an antibiotic or prednisone, take all of the medicine as prescribed. Keep taking it even if you are feeling better after a few days.    Don't smoke. Stay away from the smoke of others.    Some people with asthma find their symptoms get worse when they take aspirin and non-steroidal or fever-reducing medicines such as ibuprofen and naproxen. Talk with your healthcare provider if you think this may apply to you.    Follow-up care  Follow up with your healthcare provider, or as advised. Always bring all of your current medicines to any appointments with your healthcare provider. Also bring a complete list of medicines, even those not taken for asthma. If you don't already have one, talk with your healthcare provider about making your own Asthma  Action Plan.   A pneumonia (pneumococcal) vaccine and yearly flu shot (every fall) are advised. Ask your provider about this.   When to get medical advice  Call your healthcare provider or get medical care right away if any of these occur:      More wheezing or shortness of breath    Need to use your inhalers more often than normal without relief    Fever of 100.4 F (38 C) or higher, or as directed by your provider    Coughing up lots of dark-colored or bloody sputum (mucus)    Chest pain with each breath    If you use a peak flow meter as part of an Asthma Action Plan, and you are still in the yellow zone (50% to 80%) 15 minutes after using inhaler medicine.  Call 911  Call 911 if any of these occur:     Trouble walking or talking because you are short of breath    If you use a peak flow meter as part of an Asthma Action Plan, and you are still in the red zone (less than 50%) 15 minutes after using inhaler medicine    Lips or fingernails turn gray, purple, or blue    Feeling faint or loss of consciousness  [employer] last reviewed this educational content on 10/1/2019    8193-7746 [employer]. All rights reserved. This information is not intended as a substitute for professional medical care. Always follow your healthcare professional's instructions.

## 2021-06-29 ASSESSMENT — ASTHMA QUESTIONNAIRES: ACT_TOTALSCORE: 9

## 2021-09-02 ENCOUNTER — TELEPHONE (OUTPATIENT)
Dept: FAMILY MEDICINE | Facility: CLINIC | Age: 26
End: 2021-09-02

## 2021-09-02 NOTE — TELEPHONE ENCOUNTER
PAL out reach to Pt to touch base with Pt. PAL will reach out again next week.  [facility]  [phone]  September 2, 2021 9:24 AM

## 2021-10-03 ENCOUNTER — HEALTH MAINTENANCE LETTER (OUTPATIENT)
Age: 26
End: 2021-10-03

## 2021-10-04 ENCOUNTER — TELEPHONE (OUTPATIENT)
Dept: FAMILY MEDICINE | Facility: CLINIC | Age: 26
End: 2021-10-04

## 2021-10-15 ENCOUNTER — OFFICE VISIT (OUTPATIENT)
Dept: FAMILY MEDICINE | Facility: CLINIC | Age: 26
End: 2021-10-15
Payer: COMMERCIAL

## 2021-10-15 VITALS
DIASTOLIC BLOOD PRESSURE: 82 MMHG | TEMPERATURE: 98.3 F | BODY MASS INDEX: 30.9 KG/M2 | SYSTOLIC BLOOD PRESSURE: 112 MMHG | OXYGEN SATURATION: 97 % | WEIGHT: 180 LBS | HEART RATE: 115 BPM | RESPIRATION RATE: 18 BRPM

## 2021-10-15 DIAGNOSIS — R05.9 COUGH: ICD-10-CM

## 2021-10-15 DIAGNOSIS — R06.02 SHORTNESS OF BREATH: ICD-10-CM

## 2021-10-15 DIAGNOSIS — J45.41 MODERATE PERSISTENT ASTHMA WITH ACUTE EXACERBATION: Primary | ICD-10-CM

## 2021-10-15 PROCEDURE — U0003 INFECTIOUS AGENT DETECTION BY NUCLEIC ACID (DNA OR RNA); SEVERE ACUTE RESPIRATORY SYNDROME CORONAVIRUS 2 (SARS-COV-2) (CORONAVIRUS DISEASE [COVID-19]), AMPLIFIED PROBE TECHNIQUE, MAKING USE OF HIGH THROUGHPUT TECHNOLOGIES AS DESCRIBED BY CMS-2020-01-R: HCPCS | Performed by: PHYSICIAN ASSISTANT

## 2021-10-15 PROCEDURE — U0005 INFEC AGEN DETEC AMPLI PROBE: HCPCS | Performed by: PHYSICIAN ASSISTANT

## 2021-10-15 PROCEDURE — 99214 OFFICE O/P EST MOD 30 MIN: CPT | Performed by: PHYSICIAN ASSISTANT

## 2021-10-15 RX ORDER — BENZONATATE 100 MG/1
100 CAPSULE ORAL 3 TIMES DAILY PRN
Qty: 30 CAPSULE | Refills: 3 | Status: SHIPPED | OUTPATIENT
Start: 2021-10-15 | End: 2021-12-10 | Stop reason: ALTCHOICE

## 2021-10-15 RX ORDER — PREDNISONE 50 MG/1
50 TABLET ORAL DAILY
Qty: 5 TABLET | Refills: 0 | Status: SHIPPED | OUTPATIENT
Start: 2021-10-15 | End: 2021-10-20

## 2021-10-15 RX ORDER — IPRATROPIUM BROMIDE AND ALBUTEROL SULFATE 2.5; .5 MG/3ML; MG/3ML
1 SOLUTION RESPIRATORY (INHALATION) EVERY 4 HOURS PRN
Qty: 90 ML | Refills: 1 | Status: SHIPPED | OUTPATIENT
Start: 2021-10-15 | End: 2022-04-02

## 2021-10-15 ASSESSMENT — PATIENT HEALTH QUESTIONNAIRE - PHQ9
SUM OF ALL RESPONSES TO PHQ QUESTIONS 1-9: 16
10. IF YOU CHECKED OFF ANY PROBLEMS, HOW DIFFICULT HAVE THESE PROBLEMS MADE IT FOR YOU TO DO YOUR WORK, TAKE CARE OF THINGS AT HOME, OR GET ALONG WITH OTHER PEOPLE: SOMEWHAT DIFFICULT
SUM OF ALL RESPONSES TO PHQ QUESTIONS 1-9: 16

## 2021-10-15 NOTE — LETTER
October 15, 2021      Azul Rojas  [address] [address] [facility] [address]        To Whom It May Concern:    Azul Rojas  was seen on 10/15/21.  Please excuse her until COVID testing is back due to illness.        Sincerely,        Jannie Snell PA-C

## 2021-10-15 NOTE — PROGRESS NOTES
Assessment & Plan     Moderate persistent asthma with acute exacerbation  Started on prednisone for asthma exacerbation. Continue with DuoNebs as needed and refilled benzonatate.  COVID testing obtained but feel this is less likely the cause for her symptoms.  She is vaccinated and symptoms have been present for 2 weeks.  - predniSONE (DELTASONE) 50 MG tablet; Take 1 tablet (50 mg) by mouth daily for 5 days    Cough    - benzonatate (TESSALON) 100 MG capsule; Take 1 capsule (100 mg) by mouth 3 times daily as needed for cough  - Symptomatic COVID-19 Virus (Coronavirus) by PCR Nose    Shortness of breath    - ipratropium - albuterol 0.5 mg/2.5 mg/3 mL (DUONEB) 0.5-2.5 (3) MG/3ML neb solution; Take 1 vial (3 mLs) by nebulization every 4 hours as needed for shortness of breath / dyspnea or wheezing  - Symptomatic COVID-19 Virus (Coronavirus) by PCR Nose    Review of prior external note(s) from - CareEverywhere information from [facility] reviewed  Ordering of each unique test  Prescription drug management    Return if symptoms worsen or fail to improve.    Jannie Snell PA-C  [facility]    Kristine Liang is a 25 year old who presents for the following health issues    History of Present Illness     Asthma:  She presents for follow up of asthma.  She has some cough, some wheezing, and some shortness of breath. She is using a relief medication 2-3 times per day. She does not miss any doses of her controller medication throughout the week.Patient is aware of the following triggers: animal dander, cold air, emotions, exercise or sports, humidity, mold, smoke, strong odors and fumes and upper respiratory infections. The patient has not had a visit to the Emergency Room, Urgent Care or Hospital due to asthma since the last clinic visit.     She eats 0-1 servings of fruits and vegetables daily.She consumes 3 sweetened beverage(s) daily.She exercises with enough effort to increase her heart  rate 30 to 60 minutes per day.  She exercises with enough effort to increase her heart rate 4 days per week.   She is taking medications regularly.       ED/UC Followup:    Facility:  [facility]   Date of visit: 10/13/2021  Reason for visit: asthma exacerbation  Current Status: Would not treat her for respiratory issue. Said they were not doing that at that time so patient has not been treated (patient instructed to go to the ER)     Patient is a 25-year-old female with history of poorly controlled asthma who presents today for evaluation regarding an asthma flare.  Patient was seen and evaluated at urgent care [facility] and it was recommended that patient presents to ER for further evaluation.  Patient is prescribed Advair and albuterol. She is currently using Flovent and albuterol. She is using DuoNebs 2-4 times daily.  Symptoms started 2 weeks ago. She notes she has had a productive cough with yellow mucous. She has a sore throat with coughing. She is feeling feverish (does not have a thermometer).    Patient is vaccinated for COVID.      Review of Systems   GENERAL:  No fevers  RESP:  As noted in HPI        Objective    /82 (BP Location: Right arm, Patient Position: Chair, Cuff Size: Adult Regular)   Pulse 115   Temp 98.3  F (36.8  C) (Oral)   Resp 18   Wt 81.6 kg (180 lb)   SpO2 97%   Breastfeeding No   BMI 30.90 kg/m    Body mass index is 30.9 kg/m .  Physical Exam   GENERAL: No acute distress  HEENT: Normocephalic, PERRL, Canals patent, bilateral TM's non-erythematous, clear effusion bilateral TMs. Turbinates normal in appearance bilaterally. Posterior oropharynx non-erythematous and without exudate.  NECK: No cervical or supraclavicular lymphadenopathy.  CARDIAC: Regular rate and rhythm. No murmurs.  PULMONARY: Appears to be breathing comfortably. Wheezing throughout lung fields, most notable with exhalation but also occasionally present with inhalation. No crackles.  NEURO: Alert  and non-focal          Answers for HPI/ROS submitted by the patient on 10/15/2021  If you checked off any problems, how difficult have these problems made it for you to do your work, take care of things at home, or get along with other people?: Somewhat difficult  PHQ9 TOTAL SCORE: 16

## 2021-10-16 LAB — SARS-COV-2 RNA RESP QL NAA+PROBE: NEGATIVE

## 2021-10-16 ASSESSMENT — ASTHMA QUESTIONNAIRES: ACT_TOTALSCORE: 10

## 2021-10-16 ASSESSMENT — PATIENT HEALTH QUESTIONNAIRE - PHQ9: SUM OF ALL RESPONSES TO PHQ QUESTIONS 1-9: 16

## 2021-10-20 ENCOUNTER — OFFICE VISIT (OUTPATIENT)
Dept: FAMILY MEDICINE | Facility: CLINIC | Age: 26
End: 2021-10-20
Payer: COMMERCIAL

## 2021-10-20 VITALS
SYSTOLIC BLOOD PRESSURE: 132 MMHG | OXYGEN SATURATION: 97 % | HEART RATE: 92 BPM | DIASTOLIC BLOOD PRESSURE: 82 MMHG | BODY MASS INDEX: 32.14 KG/M2 | WEIGHT: 181.4 LBS | HEIGHT: 63 IN | TEMPERATURE: 98.2 F

## 2021-10-20 DIAGNOSIS — R05.9 COUGH: ICD-10-CM

## 2021-10-20 DIAGNOSIS — F33.2 SEVERE EPISODE OF RECURRENT MAJOR DEPRESSIVE DISORDER, WITHOUT PSYCHOTIC FEATURES (H): ICD-10-CM

## 2021-10-20 DIAGNOSIS — J45.41 MODERATE PERSISTENT ASTHMA WITH ACUTE EXACERBATION: Primary | ICD-10-CM

## 2021-10-20 PROCEDURE — 99214 OFFICE O/P EST MOD 30 MIN: CPT | Performed by: PHYSICIAN ASSISTANT

## 2021-10-20 RX ORDER — BENZONATATE 200 MG/1
200 CAPSULE ORAL 3 TIMES DAILY PRN
Qty: 30 CAPSULE | Refills: 1 | Status: SHIPPED | OUTPATIENT
Start: 2021-10-20 | End: 2021-12-27

## 2021-10-20 RX ORDER — PREDNISONE 20 MG/1
TABLET ORAL
Qty: 16 TABLET | Refills: 0 | Status: SHIPPED | OUTPATIENT
Start: 2021-10-20 | End: 2021-11-02

## 2021-10-20 ASSESSMENT — ANXIETY QUESTIONNAIRES
6. BECOMING EASILY ANNOYED OR IRRITABLE: MORE THAN HALF THE DAYS
GAD7 TOTAL SCORE: 18
5. BEING SO RESTLESS THAT IT IS HARD TO SIT STILL: SEVERAL DAYS
2. NOT BEING ABLE TO STOP OR CONTROL WORRYING: NEARLY EVERY DAY
7. FEELING AFRAID AS IF SOMETHING AWFUL MIGHT HAPPEN: NEARLY EVERY DAY
8. IF YOU CHECKED OFF ANY PROBLEMS, HOW DIFFICULT HAVE THESE MADE IT FOR YOU TO DO YOUR WORK, TAKE CARE OF THINGS AT HOME, OR GET ALONG WITH OTHER PEOPLE?: SOMEWHAT DIFFICULT
1. FEELING NERVOUS, ANXIOUS, OR ON EDGE: NEARLY EVERY DAY
GAD7 TOTAL SCORE: 18
3. WORRYING TOO MUCH ABOUT DIFFERENT THINGS: NEARLY EVERY DAY
7. FEELING AFRAID AS IF SOMETHING AWFUL MIGHT HAPPEN: NEARLY EVERY DAY
GAD7 TOTAL SCORE: 18
4. TROUBLE RELAXING: NEARLY EVERY DAY

## 2021-10-20 ASSESSMENT — PATIENT HEALTH QUESTIONNAIRE - PHQ9
10. IF YOU CHECKED OFF ANY PROBLEMS, HOW DIFFICULT HAVE THESE PROBLEMS MADE IT FOR YOU TO DO YOUR WORK, TAKE CARE OF THINGS AT HOME, OR GET ALONG WITH OTHER PEOPLE: SOMEWHAT DIFFICULT
SUM OF ALL RESPONSES TO PHQ QUESTIONS 1-9: 17
SUM OF ALL RESPONSES TO PHQ QUESTIONS 1-9: 17

## 2021-10-20 ASSESSMENT — MIFFLIN-ST. JEOR: SCORE: 1546.96

## 2021-10-20 NOTE — PROGRESS NOTES
Assessment & Plan     Moderate persistent asthma with acute exacerbation  Symptoms improving but not there yet. Will extend her prednisone taper. Increased dosing of the Tesselon Perles.  - benzonatate (TESSALON) 200 MG capsule; Take 1 capsule (200 mg) by mouth 3 times daily as needed for cough  - predniSONE (DELTASONE) 20 MG tablet; Take 2 tablets (40 mg) by mouth daily for 5 days, THEN 1 tablet (20 mg) daily for 4 days, THEN 0.5 tablets (10 mg) daily for 4 days.    Cough  As noted above.  - benzonatate (TESSALON) 200 MG capsule; Take 1 capsule (200 mg) by mouth 3 times daily as needed for cough  - predniSONE (DELTASONE) 20 MG tablet; Take 2 tablets (40 mg) by mouth daily for 5 days, THEN 1 tablet (20 mg) daily for 4 days, THEN 0.5 tablets (10 mg) daily for 4 days.    Severe episode of recurrent major depressive disorder, without psychotic features (H)  Patient did indicate some thoughts of harming herself on PHQ-9. She denies suicidal thoughts or plan. She uses boyfriend as a support system.  Discussed therapy and medications today. Patient declines referral. Discussed pharmacogenetics testing and gave information regarding testing. Patient will look into it.  Declines scheduling follow up with PCP and looking for new PCP. Names of a few in the clinic given.      Review of external notes as documented elsewhere in note  Prescription drug management    Depression Screening Follow Up    PHQ 10/20/2021   PHQ-9 Total Score 17   Q9: Thoughts of better off dead/self-harm past 2 weeks Several days   F/U: Thoughts of suicide or self-harm No   F/U: Self harm-plan -   F/U: Self-harm action -   F/U: Safety concerns No     Last PHQ-9 10/20/2021   1.  Little interest or pleasure in doing things 2   2.  Feeling down, depressed, or hopeless 2   3.  Trouble falling or staying asleep, or sleeping too much 1   4.  Feeling tired or having little energy 3   5.  Poor appetite or overeating 3   6.  Feeling bad about yourself 3   7.   Trouble concentrating 2   8.  Moving slowly or restless 0   Q9: Thoughts of better off dead/self-harm past 2 weeks 1   PHQ-9 Total Score 17   Difficulty at work, home, or with people -   In the past two weeks have you had thoughts of suicide or self harm? No   Do you have concerns about your personal safety or the safety of others? No   In the past 2 weeks have you thought about a plan or had intention to harm yourself? -   In the past 2 weeks have you acted on these thoughts in any way? -     Follow Up  Follow Up Actions Taken  Crisis resource information provided in the After Visit Summary  Patient declined referral.    Discussed the following ways the patient can remain in a safe environment:  be around others    Jannie Snell PA-C  [facility]   Azul is a 25 year old who presents for the following health issues  accompanied by her partner.    HPI     Asthma Follow-Up    Was ACT completed today?    Yes    ACT Total Scores 10/20/2021   ACT TOTAL SCORE (Goal Greater than or Equal to 20) 10   In the past 12 months, how many times did you visit the emergency room for your asthma without being admitted to the hospital? 0   In the past 12 months, how many times were you hospitalized overnight because of your asthma? 0         How many days per week do you miss taking your asthma controller medication?  0    Please describe any recent triggers for your asthma: upper respiratory infections, dust mites, animal dander and cold air    Have you had any Emergency Room Visits, Urgent Care Visits, or Hospital Admissions since your last office visit?  No      How many servings of fruits and vegetables do you eat daily?  0-1    On average, how many sweetened beverages do you drink each day (Examples: soda, juice, sweet tea, etc.  Do NOT count diet or artificially sweetened beverages)?   5    How many days per week do you exercise enough to make your heart beat faster? 7    How many  "minutes a day do you exercise enough to make your heart beat faster? 60 or more    How many days per week do you miss taking your medication? 0    She is using the DuoNeb 2-3 times daily.  She is feeling 75% better but still not there. She is still having coughing spurts.    In regards to patient's mood and mental health she has been on numerous medications previously.  She notes she has been on Effexor, Prozac (believes this one caused more suicidal thoughts) and Lexapro. She has also previously been on Remeron. Currently taking hydroxyzine as needed for anxiety attacks.      Review of Systems   GENERAL:  No fevers  RESP:  As noted in HPI        Objective    /82 (BP Location: Right arm, Patient Position: Sitting, Cuff Size: Adult Regular)   Pulse 92   Temp 98.2  F (36.8  C) (Oral)   Ht 1.6 m (5' 3\")   Wt 82.3 kg (181 lb 6.4 oz)   SpO2 97%   BMI 32.13 kg/m    Body mass index is 32.13 kg/m .  Physical Exam   GENERAL: No acute distress  HEENT: Normocephalic  CARDIAC: Regular rate and rhythm. No murmurs.  PULMONARY: Wheezes and rhonchi thoughout lung fields. No crackles.  NEURO: Alert and non-focal        Answers for HPI/ROS submitted by the patient on 10/20/2021  If you checked off any problems, how difficult have these problems made it for you to do your work, take care of things at home, or get along with other people?: Somewhat difficult  PHQ9 TOTAL SCORE: 17  KARLY 7 TOTAL SCORE: 18      "

## 2021-10-21 ASSESSMENT — ASTHMA QUESTIONNAIRES: ACT_TOTALSCORE: 10

## 2021-10-21 ASSESSMENT — PATIENT HEALTH QUESTIONNAIRE - PHQ9: SUM OF ALL RESPONSES TO PHQ QUESTIONS 1-9: 17

## 2021-10-21 ASSESSMENT — ANXIETY QUESTIONNAIRES: GAD7 TOTAL SCORE: 18

## 2021-12-10 ENCOUNTER — OFFICE VISIT (OUTPATIENT)
Dept: FAMILY MEDICINE | Facility: CLINIC | Age: 26
End: 2021-12-10
Payer: COMMERCIAL

## 2021-12-10 ENCOUNTER — PATIENT OUTREACH (OUTPATIENT)
Dept: CARE COORDINATION | Facility: CLINIC | Age: 26
End: 2021-12-10

## 2021-12-10 VITALS
TEMPERATURE: 97.9 F | BODY MASS INDEX: 33.06 KG/M2 | SYSTOLIC BLOOD PRESSURE: 118 MMHG | RESPIRATION RATE: 18 BRPM | HEIGHT: 63 IN | HEART RATE: 101 BPM | OXYGEN SATURATION: 96 % | DIASTOLIC BLOOD PRESSURE: 78 MMHG | WEIGHT: 186.6 LBS

## 2021-12-10 DIAGNOSIS — J45.41 MODERATE PERSISTENT ASTHMA WITH ACUTE EXACERBATION: Primary | ICD-10-CM

## 2021-12-10 DIAGNOSIS — F41.1 GENERALIZED ANXIETY DISORDER: ICD-10-CM

## 2021-12-10 PROCEDURE — 99214 OFFICE O/P EST MOD 30 MIN: CPT | Performed by: FAMILY MEDICINE

## 2021-12-10 RX ORDER — BUSPIRONE HYDROCHLORIDE 5 MG/1
5 TABLET ORAL 2 TIMES DAILY
Qty: 60 TABLET | Refills: 0 | Status: SHIPPED | OUTPATIENT
Start: 2021-12-10 | End: 2022-05-03

## 2021-12-10 ASSESSMENT — ANXIETY QUESTIONNAIRES
3. WORRYING TOO MUCH ABOUT DIFFERENT THINGS: NEARLY EVERY DAY
2. NOT BEING ABLE TO STOP OR CONTROL WORRYING: NEARLY EVERY DAY
GAD7 TOTAL SCORE: 21
GAD7 TOTAL SCORE: 21
5. BEING SO RESTLESS THAT IT IS HARD TO SIT STILL: NEARLY EVERY DAY
7. FEELING AFRAID AS IF SOMETHING AWFUL MIGHT HAPPEN: NEARLY EVERY DAY
4. TROUBLE RELAXING: NEARLY EVERY DAY
1. FEELING NERVOUS, ANXIOUS, OR ON EDGE: NEARLY EVERY DAY
7. FEELING AFRAID AS IF SOMETHING AWFUL MIGHT HAPPEN: NEARLY EVERY DAY
6. BECOMING EASILY ANNOYED OR IRRITABLE: NEARLY EVERY DAY
GAD7 TOTAL SCORE: 21

## 2021-12-10 ASSESSMENT — PATIENT HEALTH QUESTIONNAIRE - PHQ9
SUM OF ALL RESPONSES TO PHQ QUESTIONS 1-9: 23
SUM OF ALL RESPONSES TO PHQ QUESTIONS 1-9: 23
10. IF YOU CHECKED OFF ANY PROBLEMS, HOW DIFFICULT HAVE THESE PROBLEMS MADE IT FOR YOU TO DO YOUR WORK, TAKE CARE OF THINGS AT HOME, OR GET ALONG WITH OTHER PEOPLE: VERY DIFFICULT

## 2021-12-10 ASSESSMENT — MIFFLIN-ST. JEOR: SCORE: 1565.54

## 2021-12-10 NOTE — LETTER
[facility] CARE COORDINATION  [facility]  December 14, 2021    Azul Rojas  [address] [address] [address]      Dear Azul,    I am a clinic community health worker who works with Vincent Mendoza MD at the [facility]. I wanted to introduce myself and provide you with my contact information for you to be able to call me with any questions or concerns. Below is a description of clinic care coordination and how I can further assist you.      The clinic care coordination team is made up of a registered nurse,  and community health worker who understand the health care system. The goal of clinic care coordination is to help you manage your health and improve access to the health care system in the most efficient manner. The team can assist you in meeting your health care goals by providing education, coordinating services, strengthening the communication among your providers and supporting you with any resource needs.    Please feel free to contact me at [phone] with any questions or concerns. We are focused on providing you with the highest-quality healthcare experience possible and that all starts with you.     Sincerely,     VITOR Ontiveros. Public Health  Community Health Worker  [facility]  Clinic Care Coordination  [phone]

## 2021-12-10 NOTE — PROGRESS NOTES
"  Assessment & Plan   See after visit summary for helpful information and advice given to patient.    Moderate persistent asthma with acute exacerbation    - ipratropium (ATROVENT HFA) 17 MCG/ACT inhaler  Dispense: 12.9 g; Refill: 11  - Care Coordination Referral    Generalized anxiety disorder    - busPIRone (BUSPAR) 5 MG tablet  Dispense: 60 tablet; Refill: 0  - Care Coordination Referral               BMI:   Estimated body mass index is 33.05 kg/m  as calculated from the following:    Height as of this encounter: 1.6 m (5' 3\").    Weight as of this encounter: 84.6 kg (186 lb 9.6 oz).       Depression Screening Follow Up    PHQ 12/10/2021   PHQ-9 Total Score 23   Q9: Thoughts of better off dead/self-harm past 2 weeks Several days   F/U: Thoughts of suicide or self-harm No   F/U: Self harm-plan -   F/U: Self-harm action -   F/U: Safety concerns No                 Follow Up    Follow Up Actions Taken  Patient is already getting mental health counseling.        Return in about 3 weeks (around 12/31/2021).    [place] DO  [facility] is a 26 year old who presents for the following health issues     History of Present Illness     Asthma:  She presents for follow up of asthma.  She has some cough, some wheezing, and some shortness of breath. She is using a relief medication 2-3 times per day. She does not miss any doses of her controller medication throughout the week.Patient is aware of the following triggers: animal dander, cold air, dust mites, emotions, exercise or sports, humidity, insects/rodents, mold, occupational exposure, smoke, strong odors and fumes and upper respiratory infections. The patient has not had a visit to the Emergency Room, Urgent Care or Hospital due to asthma since the last clinic visit.     Mental Health Follow-up:  Patient presents to follow-up on Depression & Anxiety.Patient's depression since last visit has been:  Bad  The patient is not " having other symptoms associated with depression.  Patient's anxiety since last visit has been:  Bad  The patient is not having other symptoms associated with anxiety.  Any significant life events: relationship concerns, financial concerns, housing concerns and health concerns  Patient is feeling anxious or having panic attacks.  Patient has no concerns about alcohol or drug use.     Social History  Tobacco Use    Smoking status: Former Smoker    Smokeless tobacco: Former User    Tobacco comment: does vape nicotine  Vaping Use    Vaping Use: Every day      Substances: Nicotine, THC, Flavoring      Devices: Disposable  Alcohol use: No    Comment: Doesn't like it  Drug use: Yes    Types: Marijuana      Today's PHQ-9         PHQ-9 Total Score:     (P) 23   PHQ-9 Q9 Thoughts of better off dead/self-harm past 2 weeks :   (P) Several days   Thoughts of suicide or self harm:  (P) No   Self-harm Plan:        Self-harm Action:          Safety concerns for self or others: (P) No         She eats 0-1 servings of fruits and vegetables daily.She consumes 4 sweetened beverage(s) daily.She exercises with enough effort to increase her heart rate 30 to 60 minutes per day.  She exercises with enough effort to increase her heart rate 5 days per week.   She is taking medications regularly.           Patient has been experiencing more difficulty with breathing. Worse with exercise and cold air.             Review of Systems   Patient is seen for primary reason of worsening asthma symptoms.    Patient is already using a higher dose inhaled steroid along with albuterol as needed.  The combination long-acting bronchodilator and steroid medication is cost prohibitive.  Patient feels the DuoNeb nebulizer treatment helps her asthma more than the albuterol inhaler.  Considering this, she was agreeable to my suggestion of trying an ipratropium inhaler, which she can try getting covered with her current insurance.    The hydroxyzine she takes  "helps her anxiety, but makes her excessively tired. She has been taking this most days.  She is interested in trying buspirone for treatment of anxiety.  I gave her information about this medication to review prior to starting this, along with a prescription to last for 1 month with a low starting dosage.    No thoughts of self harm at exam. She states weekly therapy has helped her depression.     She stopped taking mirtazapine after only taking for one month. She does not recall why she stopped taking this. It was prescribed this past June.     Patient is still smoking marijuana and is vaping.  She knows that both of these habits are bad for her physical health.    She will be loosing her parent's insurance coverage soon.  She is interested in my suggestion of speaking with care coordination to discuss her finances.        Objective    /78 (BP Location: Right arm, Patient Position: Sitting, Cuff Size: Adult Regular)   Pulse 101   Temp 97.9  F (36.6  C) (Oral)   Resp 18   Ht 1.6 m (5' 3\")   Wt 84.6 kg (186 lb 9.6 oz)   SpO2 96%   BMI 33.05 kg/m    Body mass index is 33.05 kg/m .  Physical Exam   Vital signs reviewed.  Patient is in no acute appearing distress.  Breathing appears nonlabored.  Patient is alert and oriented ×3.  Patient is very pleasant, making good eye contact and responding with clear fluent speech.  Conversational shortness of breath.    ENT: Ear exam shows bilateral tympanic membranes to be clear without injection, nasal turbinates show no injection or edema, no pharyngeal injection or exudate.      Neck: supple with no adenoapthy, palpable abnormal masses, or thyroid abnormality.    Heart: Heart rate is regular without murmur.    Lungs: Lungs are clear to auscultation with good airflow bilaterally.    Skin/extremities: Warm and dry, with no lower leg edema.    Peak flow meter readings: 350, 400, 370                Answers for HPI/ROS submitted by the patient on 12/10/2021  If you " checked off any problems, how difficult have these problems made it for you to do your work, take care of things at home, or get along with other people?: Very difficult  PHQ9 TOTAL SCORE: 23  KARLY 7 TOTAL SCORE: 21

## 2021-12-10 NOTE — PROGRESS NOTES
Clinic Care Coordination Contact  [facility]/Voicemail       Clinical Data: Care Coordinator Outreach  Outreach attempted x 1.  Left message on patient's voicemail with call back information and requested return call.    Chart review: Reason for referral: financial support.   Financial support: insurance.     Plan: Care Coordinator will try to reach patient again in 1-2 business days.    VITOR Ontiveros. Public Health  Community Health Worker  [place] [place] [facility]  Clinic Care Coordination  [phone]

## 2021-12-10 NOTE — PATIENT INSTRUCTIONS
Use your peak flow meter before and after your treatments to better understand your asthma. Start using the ipratropium inhaler twice daily to try to decrease your cold air symptoms.  Please follow up iin 2-3 weeks to further discuss you anxiety management. I started you on a very low starting dose of the buspirone. You can still use the hydroxyzine as needed.     Patient Education     Buspirone HCl Oral Tablet 5 mg  Uses  For anxiety.  Instructions  This medicine may be taken with or without food.  It is very important that you take the medicine at about the same time every day. It will work best if you do this.  Keep the medicine at room temperature. Avoid heat and direct light.  Avoid grapefruit juice while on this medicine.  It is important that you keep taking each dose of this medicine on time even if you are feeling well.  If you forget to take a dose on time, take it as soon as you remember. If it is almost time for the next dose, do not take the missed dose. Return to your normal dosing schedule. Do not take 2 doses of this medicine at one time.  Please tell your doctor and pharmacist about all the medicines you take. Include both prescription and over-the-counter medicines. Also tell them about any vitamins, herbal medicines, or anything else you take for your health.  Cautions  Tell your doctor and pharmacist if you ever had an allergic reaction to a medicine. Symptoms of an allergic reaction can include trouble breathing, skin rash, itching, swelling, or severe dizziness.  Do not use the medication any more than instructed.  Your ability to stay alert or to react quickly may be impaired by this medicine. Do not drive or operate machinery until you know how this medicine will affect you.  Do not drink beverages with alcohol while on this medicine.  Tell the doctor or pharmacist if you are pregnant, planning to be pregnant, or breastfeeding.  Ask your pharmacist if this medicine can interact with any of  your other medicines. Be sure to tell them about all the medicines you take.  Do not start or stop any other medicines without first speaking to your doctor or pharmacist.  Call your doctor right away if you notice any unusual bleeding or bruising.  Do not share this medicine with anyone who has not been prescribed this medicine.  Side Effects  The following is a list of some common side effects from this medicine. Please speak with your doctor about what you should do if you experience these or other side effects.    agitated feeling or trouble sleeping    blurry vision    dizziness    drowsiness or sedation    lack of energy and tiredness    headaches    nausea    skin irritation such as redness, itching, rash, or burning  Call your doctor or get medical help right away if you notice any of these more serious side effects:    chest pain    face appearing mask-like    fast or irregular heart beats    uncontrollable movement of face, tongue, arms or legs    muscle aches, spasms or abnormal movements    tight or rigid muscles  A few people may have an allergic reactions to this medicine. Symptoms can include difficulty breathing, skin rash, itching, swelling, or severe dizziness. If you notice any of these symptoms, seek medical help quickly.  Extra  Please speak with your doctor, nurse, or pharmacist if you have any questions about this medicine.  [email or web address]  IMPORTANT NOTE: This document tells you briefly how to take your medicine, but it does not tell you all there is to know about it.Your doctor or pharmacist may give you other documents about your medicine. Please talk to them if you have any questions.Always follow their advice. There is a more complete description of this medicine available in English.Scan this code on your smartphone or tablet or use the web address below. You can also ask your pharmacist for a printout. If you have any questions, please ask your pharmacist.      2021 [facility] [employer], [facility].

## 2021-12-11 ASSESSMENT — ANXIETY QUESTIONNAIRES: GAD7 TOTAL SCORE: 21

## 2021-12-11 ASSESSMENT — ASTHMA QUESTIONNAIRES: ACT_TOTALSCORE: 11

## 2021-12-11 ASSESSMENT — PATIENT HEALTH QUESTIONNAIRE - PHQ9: SUM OF ALL RESPONSES TO PHQ QUESTIONS 1-9: 23

## 2021-12-13 ENCOUNTER — TELEPHONE (OUTPATIENT)
Dept: CARE COORDINATION | Facility: CLINIC | Age: 26
End: 2021-12-13
Payer: COMMERCIAL

## 2021-12-13 NOTE — PROGRESS NOTES
Clinic Care Coordination Contact  [facility]/Voicemail       Clinical Data: Care Coordinator Outreach  Outreach attempted x 2.  Left message on patient's voicemail with call back information and requested return call.    Plan: Care Coordinator will try to reach patient again in 1-2 business days.    VITOR Ontiveros. Public Health  Community Health Worker  [place] [facility]  Clinic Care Coordination  [phone]

## 2021-12-14 NOTE — PROGRESS NOTES
Clinic Care Coordination Contact  [facility]/Voicemail       Clinical Data: Care Coordinator Outreach  Outreach attempted x 3.  Left message on patient's voicemail with call back information and requested return call.    Plan: Care Coordinator will send care coordination introduction letter with care coordinator contact information and explanation of care coordination services via [employer]. Care Coordinator will do no further outreaches at this time.    VITOR Ontiveros. Public Health  Community Health Worker  [place] [facility] [facility]  Clinic Care Coordination  [phone]

## 2021-12-27 ENCOUNTER — OFFICE VISIT (OUTPATIENT)
Dept: FAMILY MEDICINE | Facility: CLINIC | Age: 26
End: 2021-12-27
Payer: COMMERCIAL

## 2021-12-27 VITALS
RESPIRATION RATE: 18 BRPM | SYSTOLIC BLOOD PRESSURE: 128 MMHG | OXYGEN SATURATION: 100 % | HEIGHT: 63 IN | HEART RATE: 100 BPM | TEMPERATURE: 98.4 F | WEIGHT: 191.5 LBS | BODY MASS INDEX: 33.93 KG/M2 | DIASTOLIC BLOOD PRESSURE: 68 MMHG

## 2021-12-27 DIAGNOSIS — R05.9 COUGH: ICD-10-CM

## 2021-12-27 DIAGNOSIS — Z30.41 ORAL CONTRACEPTIVE USE: ICD-10-CM

## 2021-12-27 DIAGNOSIS — J01.00 ACUTE NON-RECURRENT MAXILLARY SINUSITIS: ICD-10-CM

## 2021-12-27 DIAGNOSIS — J45.41 MODERATE PERSISTENT ASTHMA WITH ACUTE EXACERBATION: Primary | ICD-10-CM

## 2021-12-27 PROCEDURE — 99214 OFFICE O/P EST MOD 30 MIN: CPT | Performed by: PHYSICIAN ASSISTANT

## 2021-12-27 RX ORDER — BENZONATATE 200 MG/1
200 CAPSULE ORAL 3 TIMES DAILY PRN
Qty: 30 CAPSULE | Refills: 1 | Status: SHIPPED | OUTPATIENT
Start: 2021-12-27 | End: 2022-02-22

## 2021-12-27 RX ORDER — NORETHINDRONE ACETATE AND ETHINYL ESTRADIOL 1MG-20(21)
1 KIT ORAL DAILY
Qty: 84 TABLET | Refills: 4 | Status: SHIPPED | OUTPATIENT
Start: 2021-12-27

## 2021-12-27 RX ORDER — PREDNISONE 20 MG/1
TABLET ORAL
Qty: 20 TABLET | Refills: 0 | Status: SHIPPED | OUTPATIENT
Start: 2021-12-27 | End: 2022-02-22

## 2021-12-27 ASSESSMENT — MIFFLIN-ST. JEOR: SCORE: 1587.77

## 2021-12-27 NOTE — PROGRESS NOTES
"  Assessment & Plan     Moderate persistent asthma with acute exacerbation  Stop flovent, switch to advair.  Prednisone for flare.  Will send message in once month to see how doing with new inhaler.  - fluticasone-salmeterol (ADVAIR) 250-50 MCG/DOSE inhaler; Inhale 1 puff into the lungs every 12 hours  - predniSONE (DELTASONE) 20 MG tablet; Take 3 tabs by mouth daily x 3 days, then 2 tabs daily x 3 days, then 1 tab daily x 3 days, then 1/2 tab daily x 3 days.  - benzonatate (TESSALON) 200 MG capsule; Take 1 capsule (200 mg) by mouth 3 times daily as needed for cough    Acute non-recurrent maxillary sinusitis  Medications Prescribed Today:  Augmentin (Amoxicillin-Clavulanate) is associated with some stomach upset, particularly diarrhea.  This is decreased if doses are spaced throughout the day (every 12 hours) and taken with food.   Will discontinue and call clinic if any side effects are noted.   - amoxicillin-clavulanate (AUGMENTIN) 875-125 MG tablet; Take 1 tablet by mouth 2 times daily for 7 days    Oral contraceptive use  Refill requested  - norethindrone-ethinyl estradiol (JUNEL FE 1/20) 1-20 MG-MCG tablet; Take 1 tablet by mouth daily    Cough    - benzonatate (TESSALON) 200 MG capsule; Take 1 capsule (200 mg) by mouth 3 times daily as needed for cough      Letter to return to work in 2 days, if needs longer were let me know.       BMI:   Estimated body mass index is 33.92 kg/m  as calculated from the following:    Height as of this encounter: 1.6 m (5' 3\").    Weight as of this encounter: 86.9 kg (191 lb 8 oz).           Return in about 1 month (around 1/27/2022) for MyC Message.    Yadi Zhu PA-C  [facility] OCHOA Liang is a 26 year old who presents for the following health issues accompanied by her boyfriend.    HPI     Acute Illness  Acute illness concerns: SOB with headache and ear pain  Onset/Duration: one week  Symptoms:  Fever: no  Chills/Sweats: " "no  Headache (location?): YES  Sinus Pressure: YES  Conjunctivitis:  YES  Ear Pain: YES: bilateral, back in forth  Rhinorrhea: no  Congestion: YES  Sore Throat: YES  Cough: YES-productive of green sputum  Wheeze: YES  Decreased Appetite: YES  Nausea: no  Vomiting: no  Diarrhea: no  Dysuria/Freq.: no  Dysuria or Hematuria: no  Fatigue/Achiness: YES  Sick/Strep Exposure: no  Therapies tried and outcome: asthma medications, tessalon pearls, suppresses cough at night.Excedrine somewhat helpful for headache.      Random chest pressure after coughing spells.  Started with sore throat, productive cough.  Bad headache for 5 days.  No known fevers.  Additional complaints: None  HPI additional notes: Azul presents today with   Chief Complaint   Patient presents with     Asthma            Review of Systems   Constitutional, HEENT, cardiovascular, pulmonary, gi and gu systems are negative, except as otherwise noted.     Chart Review:  History   Smoking Status     Former Smoker   Smokeless Tobacco     Former User     Comment: does vape nicotine       Patient Active Problem List   Diagnosis     Major depressive disorder, recurrent episode, severe (H)     Generalized anxiety disorder     Cannabis abuse     Esophageal reflux (GERD)     Moderate persistent asthma without complication     Eating problem     History reviewed. No pertinent surgical history.  Problem list, Medication list, Allergies, Medical/Social/Surg hx reviewed in In[employer], updated as appropriate.      Objective    /68 (BP Location: Right arm, Patient Position: Sitting, Cuff Size: Adult Regular)   Pulse 100   Temp 98.4  F (36.9  C) (Oral)   Resp 18   Ht 1.6 m (5' 3\")   Wt 86.9 kg (191 lb 8 oz)   SpO2 100%   BMI 33.92 kg/m    Body mass index is 33.92 kg/m .  Physical Exam     Physical Exam   GENERAL: alert, active and mild distress  EYES: Grossly normal to inspection, EOMI, PERRL  HENT: Ear canals normal; TMs pearly gray with clear effusion. Nasal mucosa " moist without edema, purulent discharge. Oral mucous membranes moist, no lesions or ulcerations. Pharynx pink.  Uvula midline.  Purulent postnasal drainage. Sinuses non-tender to palpation.  NECK:2+ posterior cervical LAD bilaterally  RESP: no rales or rhonchi, expiratory wheezes bilateral and cough with deep inspiration   CV: regular rate and rhythm, normal S1 S2.  No peripheral edema.  SKIN: no suspicious lesions, no rashes  PSYCH: Alert and oriented times 3;  Able to articulate logical thoughts. Affect is normal.

## 2021-12-27 NOTE — LETTER
December 27, 2021      Azul Rojas  [address] [address] [id number]        To Whom It May Concern:    Azul Rojas was seen in our clinic. She may return to work without restrictions on Wednesday Dec 29th, sooner if symptoms have improved.      Sincerely,        Yadi Zhu PA-C

## 2022-01-29 ENCOUNTER — NURSE TRIAGE (OUTPATIENT)
Dept: NURSING | Facility: CLINIC | Age: 27
End: 2022-01-29

## 2022-01-29 ENCOUNTER — OFFICE VISIT (OUTPATIENT)
Dept: URGENT CARE | Facility: URGENT CARE | Age: 27
End: 2022-01-29

## 2022-01-29 VITALS
HEART RATE: 108 BPM | DIASTOLIC BLOOD PRESSURE: 70 MMHG | TEMPERATURE: 98 F | RESPIRATION RATE: 16 BRPM | HEIGHT: 64 IN | SYSTOLIC BLOOD PRESSURE: 118 MMHG | OXYGEN SATURATION: 100 % | BODY MASS INDEX: 28.51 KG/M2 | WEIGHT: 167 LBS

## 2022-01-29 DIAGNOSIS — R05.9 COUGH: ICD-10-CM

## 2022-01-29 DIAGNOSIS — J45.31 MILD PERSISTENT ASTHMA WITH EXACERBATION: Primary | ICD-10-CM

## 2022-01-29 PROCEDURE — U0005 INFEC AGEN DETEC AMPLI PROBE: HCPCS | Performed by: FAMILY MEDICINE

## 2022-01-29 PROCEDURE — 99214 OFFICE O/P EST MOD 30 MIN: CPT | Performed by: FAMILY MEDICINE

## 2022-01-29 PROCEDURE — U0003 INFECTIOUS AGENT DETECTION BY NUCLEIC ACID (DNA OR RNA); SEVERE ACUTE RESPIRATORY SYNDROME CORONAVIRUS 2 (SARS-COV-2) (CORONAVIRUS DISEASE [COVID-19]), AMPLIFIED PROBE TECHNIQUE, MAKING USE OF HIGH THROUGHPUT TECHNOLOGIES AS DESCRIBED BY CMS-2020-01-R: HCPCS | Performed by: FAMILY MEDICINE

## 2022-01-29 RX ORDER — PREDNISONE 20 MG/1
TABLET ORAL
Qty: 20 TABLET | Refills: 0 | Status: SHIPPED | OUTPATIENT
Start: 2022-01-29 | End: 2022-02-22

## 2022-01-29 ASSESSMENT — MIFFLIN-ST. JEOR: SCORE: 1492.51

## 2022-01-29 ASSESSMENT — ASTHMA QUESTIONNAIRES: ACT_TOTALSCORE: 8

## 2022-01-29 NOTE — PATIENT INSTRUCTIONS
Take full course of prednisone taper.    Continue with inhalers and nebulizer treatments      Patient Education     Asthma (Adult)   Asthma is a disease where the medium and small air passages in the lung go into spasm and restrict air flow. Inflammation and swelling of the airways cause further blockage. During an acute asthma attack, these factors cause trouble breathing, wheezing, cough, and chest tightness.     An asthma attack can be triggered by many things. Common triggers include infections such as the common cold, bronchitis, and pneumonia. Irritants such as smoke or pollutants in the air, very cold air, emotional upset, and exercise can also trigger an attack. In many adults with asthma, allergies to dust, mold, pollen, and animal dander can cause an asthma attack. Skipping doses of daily asthma medicine can also bring on an asthma attack.   Asthma can be controlled using the correct medicines prescribed by your healthcare provider and staying away from known triggers including allergens and irritants.   Home care    Take prescribed medicine exactly at the times advised. If you need medicine such as from a handheld inhaler or aerosol breathing machine more than every 4 hours, contact your healthcare provider or get medical care right away. If you are prescribed an antibiotic or prednisone, take all of the medicine as prescribed. Keep taking it even if you are feeling better after a few days.    Don't smoke. Stay away from the smoke of others.    Some people with asthma find their symptoms get worse when they take aspirin and non-steroidal or fever-reducing medicines such as ibuprofen and naproxen. Talk with your healthcare provider if you think this may apply to you.    Follow-up care  Follow up with your healthcare provider, or as advised. Always bring all of your current medicines to any appointments with your healthcare provider. Also bring a complete list of medicines, even those not taken for asthma.  If you don't already have one, talk with your healthcare provider about making your own Asthma Action Plan.   A pneumonia (pneumococcal) vaccine and yearly flu shot (every fall) are advised. Ask your provider about this.   When to get medical advice  Call your healthcare provider or get medical care right away if any of these occur:      More wheezing or shortness of breath    Need to use your inhalers more often than normal without relief    Fever of 100.4 F (38 C) or higher, or as directed by your provider    Coughing up lots of dark-colored or bloody sputum (mucus)    Chest pain with each breath    If you use a peak flow meter as part of an Asthma Action Plan, and you are still in the yellow zone (50% to 80%) 15 minutes after using inhaler medicine.  Call 911  Call 911 if any of these occur:     Trouble walking or talking because you are short of breath    If you use a peak flow meter as part of an Asthma Action Plan, and you are still in the red zone (less than 50%) 15 minutes after using inhaler medicine    Lips or fingernails turn gray, purple, or blue    Feeling faint or loss of consciousness  [employer] last reviewed this educational content on 10/1/2019    1526-4587 [employer]. All rights reserved. This information is not intended as a substitute for professional medical care. Always follow your healthcare professional's instructions.           Patient Education     Controlling Asthma Triggers: Allergens    For many people with lung problems such as asthma or COPD, breathing in allergens leads to inflamed airways. Allergens also cause other types of reactions in some people. For example, a runny nose, itchy, watery eyes, or a skin rash. Do your best to stay away from allergens that trigger symptoms. The tips below can help reduce any reaction you may have to certain allergens.    Dust mites  Dust mites are tiny bugs too small to see or feel. But they can be a major trigger for allergy and  asthma symptoms. Dust mites live in mattresses, bedding, carpets, and upholstered furniture. They can be carried on indoor dust. They thrive in warm, moist environments.    Wash bedding in hot water (130 F/54.4 C) each week. This kills the dust mites.    Cover mattress and pillows with special dust-mite-proof (hypoallergenic) cases.    Don t use upholstered furniture such as sofas or chairs in the bedroom.    Use allergy-proof filters for air conditioners and furnaces. Follow product maker's instructions for maintaining and replacing filters.    If you can, replace vzbi-lb-bnte carpets with wood, tile, or linoleum floors. This is very important in the bedroom.  Animals  Animals with fur or feathers often make allergens. These are shed as tiny particles called dander. Dander can float through the air. Or it can stick to carpet, clothing, and furniture.    Choose a pet that doesn t have fur or feathers. Examples are fish and reptiles.    Keep pets with fur or feathers out of your home. If you can t do this, keep them out of your bedroom. But keeping a pet out of your bedroom doesn't mean your bedroom is free of pet allergens. If you sit on the couch in the living room and then go into your bedroom, you have brought the pet allergen there.    Wash your hands and clothes after handling pets.    Give your pet a bath each week to decrease the amount allergens.    Avoid contact with soiled litter cages as much as possible.  Mold  Mold grows in damp places, such as bathrooms, basements, and closets. It can grow anywhere flooding or a fire has caused water damage. Mold can live behind the walls if there has been water damage.    Clean damp areas weekly to prevent mold growth. This includes shower stalls and sinks. You may need someone to clean these areas for you. Or try wearing a mask.    Run an exhaust fan while bathing. Or leave a window or door open in the bathroom.    Repair water leaks in or around your home.    Have  someone else cut grass or rake leaves, if possible.    Don t use vaporizers, or humidifiers. These put water into the air and encourage mold growth.  Pollen  Pollen from trees, grasses, and weeds is a common allergen. Flower pollens are generally not a problem.    Try to learn what types of pollen affect you most. Pollen levels vary depending on the plant, the season, and the time of day.    Use air conditioning instead of opening the windows in your home or car. In the car, choose the setting to recirculate the air, so less pollen gets in.    Have someone else do yard work, if possible.    Change clothes in a mudroom when you get home if you are highly allergic to pollens. This will keep most of the pollen from entering the house.  Cockroaches and mice  Cockroaches and mice are common household pests. They also produce allergens.    Keep your kitchen clean and dry. A leaky faucet or drain can attract roaches.    Remove garbage from your home daily.    Store food in tightly sealed containers. Wash dishes as soon as they are used.    Use bait stations or traps to control roaches. Don't use chemical sprays.  [employer] last reviewed this educational content on 4/1/2019 2000-2021 [employer]. All rights reserved. This information is not intended as a substitute for professional medical care. Always follow your healthcare professional's instructions.

## 2022-01-29 NOTE — TELEPHONE ENCOUNTER
Pt has been coughing since December and is now feeling SOB    Per protocol - Go to ED now     Pt refuses protocol and stated that she will go to [facility] when opens today     Care advice given per protocol and when to call back. Pt verbalized understanding and agrees to plan of care.    Laura Mohan RN  [place] Nurse Advisor  6:33 AM 1/29/2022      COVID 19 Nurse Triage Plan/Patient Instructions    Please be aware that novel coronavirus (COVID-19) may be circulating in the community. If you develop symptoms such as fever, cough, or SOB or if you have concerns about the presence of another infection including coronavirus (COVID-19), please contact your health care provider or visit [email or web address].     Disposition/Instructions    Call to EMS/911 recommended. Follow protocol based instructions.     Bring Your Own Device:  Please also bring your smart device(s) (smart phones, tablets, laptops) and their charging cables for your personal use and to communicate with your care team during your visit.    Thank you for taking steps to prevent the spread of this virus.  o Limit your contact with others.  o Wear a simple mask to cover your cough.  o Wash your hands well and often.    Resources    [facility]: About COVID-19: [email or web address]    CDC: What to Do If You're Sick: www.cdc.gov/coronavirus/2019-ncov/about/steps-when-sick.html    CDC: Ending Home Isolation: www.cdc.gov/coronavirus/2019-ncov/hcp/disposition-in-home-patients.html     CDC: Caring for Someone: www.cdc.gov/coronavirus/2019-ncov/if-you-are-sick/care-for-someone.html     [facility]: Interim Guidance for Hospital Discharge to Home: www.health.[facility].[place].us/diseases/coronavirus/hcp/hospdischarge.pdf    [facility] clinical trials (COVID-19 research studies): [email or web address].[email or web address]/umn-clinical-trials     Below are the COVID-19 hotlines at the [place] Department of [facility] ([facility]). Interpreters are available.   o For  health questions: Call 223-507-6110 or 1-459.376.9927 (7 a.m. to 7 p.m.)  o For questions about schools and childcare: Call 894-474-8610 or 1-669.365.7736 (7 a.m. to 7 p.m.)                           Reason for Disposition    SEVERE difficulty breathing (e.g., struggling for each breath, speaks in single words)    Protocols used: CORONAVIRUS (COVID-19) DIAGNOSED OR [id number]-T-LS 8.25.2021

## 2022-01-29 NOTE — PROGRESS NOTES
"SUBJECTIVE:   Azul Rojas is a 26 year old female presenting with a chief complaint of asthma flare.    Seen last month for asthma care, was treated with prednisone and had inhaler adjustments, also given antibiotic for sinus infection.  No fever.  Feels like she has more mucous in lungs.  Will get worsening asthma with cold.  Symptoms slowly worsening over the past 3 weeks.  Endorsed wheezing and SOB, notice that will get \"popping\" sounds    Was started on Advair, atrovent and has albuterol inhaler.     No close positive COVID exposure, okay to obtain screen as she did not have COVID screen last month when seen.    Completed Pfizer COVID vaccinations in June    Past Medical History:   Diagnosis Date     Anxiety and depression      Shortness of breath      Current Outpatient Medications   Medication Sig Dispense Refill     albuterol (PROAIR HFA/PROVENTIL HFA/VENTOLIN HFA) 108 (90 Base) MCG/ACT inhaler Inhale 2 puffs into the lungs every 4 hours as needed for shortness of breath / dyspnea or wheezing 18 g 11     busPIRone (BUSPAR) 5 MG tablet Take 1 tablet (5 mg) by mouth 2 times daily 60 tablet 0     fluticasone-salmeterol (ADVAIR) 250-50 MCG/DOSE inhaler Inhale 1 puff into the lungs every 12 hours 60 each 3     hydrOXYzine (ATARAX) 25 MG tablet Take 1 tablet (25 mg) by mouth 3 times daily as needed for anxiety 90 tablet 3     ipratropium (ATROVENT HFA) 17 MCG/ACT inhaler Inhale 2 puffs into the lungs 2 times daily 12.9 g 11     ipratropium - albuterol 0.5 mg/2.5 mg/3 mL (DUONEB) 0.5-2.5 (3) MG/3ML neb solution Take 1 vial (3 mLs) by nebulization every 4 hours as needed for shortness of breath / dyspnea or wheezing 90 mL 1     norethindrone-ethinyl estradiol (JUNEL FE 1/20) 1-20 MG-MCG tablet Take 1 tablet by mouth daily 84 tablet 4     omeprazole (PRILOSEC) 40 MG DR capsule Take 1 capsule (40 mg) by mouth daily 30 capsule 5     benzonatate (TESSALON) 200 MG capsule Take 1 capsule (200 mg) by mouth 3 times " "daily as needed for cough (Patient not taking: Reported on 1/29/2022) 30 capsule 1     predniSONE (DELTASONE) 20 MG tablet Take 3 tabs by mouth daily x 3 days, then 2 tabs daily x 3 days, then 1 tab daily x 3 days, then 1/2 tab daily x 3 days. (Patient not taking: Reported on 1/29/2022) 20 tablet 0     Social History     Tobacco Use     Smoking status: Former Smoker     Smokeless tobacco: Former User     Tobacco comment: does vape nicotine   Substance Use Topics     Alcohol use: No     Comment: Doesn't like it       ROS:  Review of systems negative except as stated above.    OBJECTIVE:  /70 (BP Location: Right arm, Patient Position: Chair, Cuff Size: Adult Regular)   Pulse 108   Temp 98  F (36.7  C) (Oral)   Resp 16   Ht 1.626 m (5' 4\")   Wt 75.8 kg (167 lb)   SpO2 100%   Breastfeeding No   BMI 28.67 kg/m    GENERAL APPEARANCE: healthy, alert and no distress  EYES: EOMI,  PERRL, conjunctiva clear  RESP: lungs clear to auscultation - no rales, rhonchi or wheezes  CV: regular rates and rhythm, normal S1 S2, no murmur noted  PSYCH: mentation appears normal and affect normal/bright    ASSESSMENT/PLAN:  (J45.31) Mild persistent asthma with exacerbation  (primary encounter diagnosis)  Plan: predniSONE (DELTASONE) 20 MG tablet        Continue with inhalers    (R05.9) Cough  Plan: Symptomatic; Unknown COVID-19 Virus         (Coronavirus) by PCR Nose            Patient with underlying asthma with worsening symptoms again, vitals stable and no acute respiratory distress.  Discussed primary provider care for asthma care plan and may require adjustments still for inhalers use.  At this time will given RX prednisone taper again.  Patient still has refills left on inhalers.  COVID screen obtained today due to underlying asthma and symptoms overlap with COVID infection.    Follow up with primary provider in 2 weeks    Brian Tyson MD  January 29, 2022 9:35 AM              "

## 2022-01-31 LAB — SARS-COV-2 RNA RESP QL NAA+PROBE: NEGATIVE

## 2022-02-22 ENCOUNTER — OFFICE VISIT (OUTPATIENT)
Dept: FAMILY MEDICINE | Facility: CLINIC | Age: 27
End: 2022-02-22
Payer: COMMERCIAL

## 2022-02-22 VITALS
WEIGHT: 199.2 LBS | RESPIRATION RATE: 18 BRPM | HEART RATE: 115 BPM | OXYGEN SATURATION: 98 % | TEMPERATURE: 98.2 F | BODY MASS INDEX: 34.01 KG/M2 | SYSTOLIC BLOOD PRESSURE: 140 MMHG | DIASTOLIC BLOOD PRESSURE: 82 MMHG | HEIGHT: 64 IN

## 2022-02-22 DIAGNOSIS — Z91.09 ENVIRONMENTAL ALLERGIES: ICD-10-CM

## 2022-02-22 DIAGNOSIS — J01.00 ACUTE NON-RECURRENT MAXILLARY SINUSITIS: ICD-10-CM

## 2022-02-22 DIAGNOSIS — J45.41 MODERATE PERSISTENT ASTHMA WITH ACUTE EXACERBATION: Primary | ICD-10-CM

## 2022-02-22 DIAGNOSIS — J45.40 MODERATE PERSISTENT ASTHMA WITHOUT COMPLICATION: ICD-10-CM

## 2022-02-22 PROCEDURE — 99214 OFFICE O/P EST MOD 30 MIN: CPT | Performed by: FAMILY MEDICINE

## 2022-02-22 RX ORDER — PREDNISONE 20 MG/1
40 TABLET ORAL DAILY
Qty: 10 TABLET | Refills: 0 | Status: SHIPPED | OUTPATIENT
Start: 2022-02-22 | End: 2022-02-27

## 2022-02-22 RX ORDER — CETIRIZINE HYDROCHLORIDE 10 MG/1
10 TABLET ORAL DAILY
COMMUNITY
Start: 2022-02-22

## 2022-02-22 RX ORDER — MONTELUKAST SODIUM 10 MG/1
10 TABLET ORAL AT BEDTIME
Qty: 30 TABLET | Refills: 0 | Status: SHIPPED | OUTPATIENT
Start: 2022-02-22 | End: 2022-05-03

## 2022-02-22 NOTE — PROGRESS NOTES
"  Assessment & Plan     Moderate persistent asthma with acute exacerbation  Take Advair 2 times daily consistently for asthma.  Start Singulair, zyrtec, and flonase nasal spray.  - predniSONE (DELTASONE) 20 MG tablet; Take 2 tablets (40 mg) by mouth daily for 5 days    Acute non-recurrent maxillary sinusitis  Start flonase nasal spray daily due to cat allergy    Moderate persistent asthma without complication  Uncontrolled asthma with frequent exacerbations and allergy to cats in the home.  - montelukast (SINGULAIR) 10 MG tablet; Take 1 tablet (10 mg) by mouth At Bedtime    Environmental allergies  Allergic to cats in the home which can contribute to asthma exacerbations.  - cetirizine (ZYRTEC) 10 MG tablet; Take 1 tablet (10 mg) by mouth daily     BMI:   Estimated body mass index is 34.19 kg/m  as calculated from the following:    Height as of this encounter: 1.626 m (5' 4\").    Weight as of this encounter: 90.4 kg (199 lb 3.2 oz).     Return in about 1 month (around 3/22/2022) for medication recheck.    Shaka Nichols MD  [facility]ang is a 26 year old who presents for the following health issues     HPI     Asthma Follow-Up    Was ACT completed today?  No      Do you have a cough?  YES    Are you experiencing any wheezing in your chest?  YES    Do you have any shortness of breath?  YES, especially with activity     How often are you using a short-acting (rescue) inhaler or nebulizer, such as Albuterol?  2-3 times per day    How many days per week do you miss taking your asthma controller medication?  0    Please describe any recent triggers for your asthma: upper respiratory infections, animal dander, exercise or sports and cold air    Have you had any Emergency Room Visits, Urgent Care Visits, or Hospital Admissions since your last office visit?  No      How many servings of fruits and vegetables do you eat daily?  0-1    On average, how many sweetened beverages do " "you drink each day (Examples: soda, juice, sweet tea, etc.  Do NOT count diet or artificially sweetened beverages)?   6    How many days per week do you exercise enough to make your heart beat faster? 3 or less    How many minutes a day do you exercise enough to make your heart beat faster? 30 - 60  How many days per week do you miss taking your medication? 1    What makes it hard for you to take your medications?  remembering to take    History of moderate persistent asthma here with increased shortness of breath and wheezing.  She feels like rattling in her chest at times.  Sinus pressure and headaches with clear drainage.  Some productive cough with clear sputum.  No fever, occasional chills over 1 week.  Taking Advair once daily instead of twice daily.  Lives in home with parents and sister who has cats which she is allergic to.    Review of Systems         Objective    BP (!) 140/94 (BP Location: Right arm, Patient Position: Sitting, Cuff Size: Adult Regular)   Pulse (!) 125   Temp 98.2  F (36.8  C) (Oral)   Resp 18   Ht 1.626 m (5' 4\")   Wt 90.4 kg (199 lb 3.2 oz)   SpO2 98%   BMI 34.19 kg/m    Body mass index is 34.19 kg/m .  Physical Exam   GENERAL: healthy, alert and no distress  EYES: Eyes grossly normal to inspection, PERRL and conjunctivae and sclerae normal  HENT: ear canals and TM's normal, nares congested with pale mucosa and clear drainage  RESP: mild scattered wheezes, good air movement, no respiratory distress  CV: regular rate and rhythm, normal S1 S2, no S3 or S4, no murmur, click or rub      Physician Attestation   I, Shaka Nichols, was present with the medical/SINDI student who participated in the service and in the documentation of the note.  I have verified the history and personally performed the physical exam and medical decision making.  I agree with the assessment and plan of care as documented in the note.      Items personally reviewed: vitals.    Shaka Nichols MD        "

## 2022-02-22 NOTE — PATIENT INSTRUCTIONS
Zyrtec OTC 10 mg daily for allergies    Flonase 2 sprays in the morning    Prednisone for 5 days 2 pills each day    Singulair for both asthma and allergies 1 tab at night

## 2022-02-22 NOTE — LETTER
My Asthma Action Plan    Name: Azul Rojas   YOB: 1995  Date: 2/22/2022   My doctor: Shaka Nichols MD   My clinic: [facility]        My Control Medicine: Fluticasone propionate + salmeterol (Advair Diskus or Wixela Inhub) -  250/50 mcg 2 times daily  Montelukast (Singulair) -  10 mg at night  Zyrtec 10 mg, and flonase spray  My Rescue Medicine: Albuterol and Ipratropium (Duoneb) nebulizer solution as needed  My Oral Steroid Medicine: prednisone My Asthma Severity:   Moderate Persistent  Know your asthma triggers  upper respiratory infections  animal dander  exercise or sports  cold air            GREEN ZONE   Good Control    I feel good    No cough or wheeze    Can work, sleep and play without asthma symptoms       Take your asthma control medicine every day.     1. If exercise triggers your asthma, take your rescue medication    15 minutes before exercise or sports, and    During exercise if you have asthma symptoms  2. Spacer to use with inhaler: If you have a spacer, make sure to use it with your inhaler             YELLOW ZONE Getting Worse  I have ANY of these:    I do not feel good    Cough or wheeze    Chest feels tight    Wake up at night   1. Keep taking your Green Zone medications  2. Start taking your rescue medicine:    every 20 minutes for up to 1 hour. Then every 4 hours for 24-48 hours.  3. If you stay in the Yellow Zone for more than 12-24 hours, contact your doctor.  4. If you do not return to the Green Zone in 12-24 hours or you get worse, start taking your oral steroid medicine if prescribed by your provider.           RED ZONE Medical Alert - Get Help  I have ANY of these:    I feel awful    Medicine is not helping    Breathing getting harder    Trouble walking or talking    Nose opens wide to breathe       1. Take your rescue medicine NOW  2. If your provider has prescribed an oral steroid medicine, start taking it NOW  3. Call your doctor  NOW  4. If you are still in the Red Zone after 20 minutes and you have not reached your doctor:    Take your rescue medicine again and    Call 911 or go to the emergency room right away    See your regular doctor within 2 weeks of an Emergency Room or Urgent Care visit for follow-up treatment.          Annual Reminders:  Meet with Asthma Educator,  Flu Shot in the Fall, consider Pneumonia Vaccination for patients with asthma (aged 19 and older).    Pharmacy: [id number] [place], [place] - [address]    Electronically signed by Shaka Nichols MD   Date: 02/22/22                      Asthma Triggers  How To Control Things That Make Your Asthma Worse    Triggers are things that make your asthma worse.  Look at the list below to help you find your triggers and what you can do about them.  You can help prevent asthma flare-ups by staying away from your triggers.      Trigger                                                          What you can do   Cigarette Smoke  Tobacco smoke can make asthma worse. Do not allow smoking in your home, car or around you.  Be sure no one smokes at a child s day care or school.  If you smoke, ask your health care provider for ways to help you quit.  Ask family members to quit too.  Ask your health care provider for a referral to Quit Plan to help you quit smoking, or call 1-448-271-PLAN.     Colds, Flu, Bronchitis  These are common triggers of asthma. Wash your hands often.  Don t touch your eyes, nose or mouth.  Get a flu shot every year.     Dust Mites  These are tiny bugs that live in cloth or carpet. They are too small to see. Wash sheets and blankets in hot water every week.   Encase pillows and mattress in dust mite proof covers.  Avoid having carpet if you can. If you have carpet, vacuum weekly.   Use a dust mask and HEPA vacuum.   Pollen and Outdoor Mold  Some people are allergic to trees, grass, or weed pollen, or molds. Try to keep your windows closed.  Limit  time out doors when pollen count is high.   Ask you health care provider about taking medicine during allergy season.     Animal Dander  Some people are allergic to skin flakes, urine or saliva from pets with fur or feathers. Keep pets with fur or feathers out of your home.    If you can t keep the pet outdoors, then keep the pet out of your bedroom.  Keep the bedroom door closed.  Keep pets off cloth furniture and away from stuffed toys.     Mice, Rats, and Cockroaches   Some people are allergic to the waste from these pests.   Cover food and garbage.  Clean up spills and food crumbs.  Store grease in the refrigerator.   Keep food out of the bedroom.   Indoor Mold  This can be a trigger if your home has high moisture. Fix leaking faucets, pipes, or other sources of water.   Clean moldy surfaces.  Dehumidify basement if it is damp and smelly.   Smoke, Strong Odors, and Sprays  These can reduce air quality. Stay away from strong odors and sprays, such as perfume, powder, hair spray, paints, smoke incense, paint, cleaning products, candles and new carpet.   Exercise or Sports  Some people with asthma have this trigger. Be active!  Ask your doctor about taking medicine before sports or exercise to prevent symptoms.    Warm up for 5-10 minutes before and after sports or exercise.     Other Triggers of Asthma  Cold air:  Cover your nose and mouth with a scarf.  Sometimes laughing or crying can be a trigger.  Some medicines and food can trigger asthma.

## 2022-03-07 NOTE — PROGRESS NOTES
Pre-Visit Planning   Next 5 appointments (look out 90 days)    Mar 08, 2022 10:30 AM  (Arrive by 10:10 AM)  Provider Visit with Yadi Zhu PA-C  [facility] ) [address] [facility] [address] [phone]        Appointment Notes for this encounter:   ACT, Asthma/Medication    Questionnaires Reviewed/Assigned  No additional questionnaires are needed    Patient preferred phone number: [phone]    Unable to reach. Left voicemail. Advised patient to call clinic back at [phone].

## 2022-03-08 ENCOUNTER — OFFICE VISIT (OUTPATIENT)
Dept: FAMILY MEDICINE | Facility: CLINIC | Age: 27
End: 2022-03-08
Payer: COMMERCIAL

## 2022-03-08 VITALS
OXYGEN SATURATION: 95 % | DIASTOLIC BLOOD PRESSURE: 84 MMHG | HEART RATE: 90 BPM | WEIGHT: 200.3 LBS | BODY MASS INDEX: 34.19 KG/M2 | RESPIRATION RATE: 18 BRPM | SYSTOLIC BLOOD PRESSURE: 130 MMHG | TEMPERATURE: 98.2 F | HEIGHT: 64 IN

## 2022-03-08 DIAGNOSIS — G43.009 MIGRAINE WITHOUT AURA AND WITHOUT STATUS MIGRAINOSUS, NOT INTRACTABLE: ICD-10-CM

## 2022-03-08 DIAGNOSIS — M54.9 ACUTE BILATERAL BACK PAIN, UNSPECIFIED BACK LOCATION: ICD-10-CM

## 2022-03-08 DIAGNOSIS — J45.41 MODERATE PERSISTENT ASTHMA WITH ACUTE EXACERBATION: Primary | ICD-10-CM

## 2022-03-08 DIAGNOSIS — F33.2 SEVERE EPISODE OF RECURRENT MAJOR DEPRESSIVE DISORDER, WITHOUT PSYCHOTIC FEATURES (H): ICD-10-CM

## 2022-03-08 PROCEDURE — 99214 OFFICE O/P EST MOD 30 MIN: CPT | Performed by: PHYSICIAN ASSISTANT

## 2022-03-08 RX ORDER — METHYLPREDNISOLONE 4 MG
TABLET, DOSE PACK ORAL
Qty: 21 TABLET | Refills: 0 | Status: SHIPPED | OUTPATIENT
Start: 2022-03-08 | End: 2022-05-03

## 2022-03-08 RX ORDER — SUMATRIPTAN 50 MG/1
50-100 TABLET, FILM COATED ORAL
Qty: 9 TABLET | Refills: 3 | Status: SHIPPED | OUTPATIENT
Start: 2022-03-08 | End: 2022-05-03

## 2022-03-08 ASSESSMENT — ANXIETY QUESTIONNAIRES
3. WORRYING TOO MUCH ABOUT DIFFERENT THINGS: NEARLY EVERY DAY
7. FEELING AFRAID AS IF SOMETHING AWFUL MIGHT HAPPEN: NEARLY EVERY DAY
1. FEELING NERVOUS, ANXIOUS, OR ON EDGE: NEARLY EVERY DAY
7. FEELING AFRAID AS IF SOMETHING AWFUL MIGHT HAPPEN: NEARLY EVERY DAY
GAD7 TOTAL SCORE: 17
5. BEING SO RESTLESS THAT IT IS HARD TO SIT STILL: SEVERAL DAYS
4. TROUBLE RELAXING: NEARLY EVERY DAY
6. BECOMING EASILY ANNOYED OR IRRITABLE: SEVERAL DAYS
2. NOT BEING ABLE TO STOP OR CONTROL WORRYING: NEARLY EVERY DAY
GAD7 TOTAL SCORE: 17
GAD7 TOTAL SCORE: 17

## 2022-03-08 ASSESSMENT — PATIENT HEALTH QUESTIONNAIRE - PHQ9
SUM OF ALL RESPONSES TO PHQ QUESTIONS 1-9: 20
SUM OF ALL RESPONSES TO PHQ QUESTIONS 1-9: 20
10. IF YOU CHECKED OFF ANY PROBLEMS, HOW DIFFICULT HAVE THESE PROBLEMS MADE IT FOR YOU TO DO YOUR WORK, TAKE CARE OF THINGS AT HOME, OR GET ALONG WITH OTHER PEOPLE: SOMEWHAT DIFFICULT

## 2022-03-08 ASSESSMENT — ASTHMA QUESTIONNAIRES: ACT_TOTALSCORE: 13

## 2022-03-08 NOTE — PROGRESS NOTES
"  Assessment & Plan     Moderate persistent asthma with acute exacerbation  ACT of 13 today, up from 8 three weeks ago.  Will increase advair dose.  Pt needs to take consistently.    - fluticasone-salmeterol (ADVAIR DISKUS) 500-50 MCG/DOSE inhaler; Inhale 1 puff into the lungs 2 times daily    Acute bilateral back pain, unspecified back location  Had a fall off her horse over a month ago.  Has had widespread pain ever since.    - methylPREDNISolone (MEDROL DOSEPAK) 4 MG tablet therapy pack; Follow Package Directions    Migraine without aura and without status migrainosus, not intractable  Has photophobia and nausea, has never been on medication for migraines.  Discussed trying imitrex, side effects discussed.  If helping but still  having frequent headaches may consider propranolol to see if helps with anxiety as well.  - SUMAtriptan (IMITREX) 50 MG tablet; Take 1-2 tablets ( mg) by mouth at onset of headache May repeat in 2 hrs.  Max 200 mg in 24 hrs    Severe episode of recurrent major depressive disorder, without psychotic features (H)  Phq9 down a little bit today.  Pt says she is having a good day.               BMI:   Estimated body mass index is 34.38 kg/m  as calculated from the following:    Height as of this encounter: 1.626 m (5' 4\").    Weight as of this encounter: 90.9 kg (200 lb 4.8 oz).       Depression Screening Follow Up    PHQ 3/8/2022   PHQ-9 Total Score 20   Q9: Thoughts of better off dead/self-harm past 2 weeks Several days   F/U: Thoughts of suicide or self-harm No   F/U: Self harm-plan -   F/U: Self-harm action -   F/U: Safety concerns No                 Follow Up    Follow Up Actions Taken      Discussed the following ways the patient can remain in a safe environment:        Return in about 1 month (around 4/8/2022) for Med Check, asthma, migraines, back.    Yadi Zhu PA-C  [facility]    Kristine Liang is a 26 year old who presents for the " following health issues     History of Present Illness     Asthma:  She presents for follow up of asthma.  She has some cough, some wheezing, and some shortness of breath. She is using a relief medication 2-3 times per day. She does not miss any doses of her controller medication throughout the week.Patient is aware of the following triggers: animal dander, cold air, emotions, humidity, insects/rodents, mold, smoke, strong odors and fumes and upper respiratory infections. The patient has not had a visit to the Emergency Room, Urgent Care or Hospital due to asthma since the last clinic visit.     Back Pain:  She presents for follow up of back pain. Patient's back pain is a chronic problem.  Location of back pain:  Right lower back, left lower back, right middle of back and left middle of back  Description of back pain: cramping, gnawing, sharp, shooting and stabbing  Back pain spreads: right buttocks, left buttocks, right shoulder and left shoulder    Since patient first noticed back pain, pain is: always present, but gets better and worse  Does back pain interfere with her job:  Yes      Mental Health Follow-up:  Patient presents to follow-up on Depression & Anxiety.Patient's depression since last visit has been:  Better  The patient is not having other symptoms associated with depression.  Patient's anxiety since last visit has been:  Better  The patient is not having other symptoms associated with anxiety.  Any significant life events: financial concerns, housing concerns and health concerns  Patient is not feeling anxious or having panic attacks.  Patient has no concerns about alcohol or drug use.       Today's PHQ-9         PHQ-9 Total Score: 20  PHQ-9 Q9 Thoughts of better off dead/self-harm past 2 weeks :   (P) Several days  Thoughts of suicide or self harm: (P) No  Self-harm Plan:     Self-harm Action:       Safety concerns for self or others: (P) No    How difficult have these problems made it for you to do  "your work, take care of things at home, or get along with other people: Somewhat difficult    Today's KARLY-7 Score: 17    Migraines:   Since the patient's last clinic visit, headaches are: worsened  The patient is getting headaches:  Every day most of the day  She is able to do normal daily activities when she has a migraine.  The patient is taking the following rescue/relief medications:  No rescue/relief medications and Tylenol   Patient states \"I get no relief\" from the rescue/relief medications.   The patient is taking the following medications to prevent migraines:  No medications to prevent migraines  In the past 4 weeks, the patient has gone to an Urgent Care or Emergency Room 0 times times due to headaches.    She eats 0-1 servings of fruits and vegetables daily.She consumes 6 sweetened beverage(s) daily.She exercises with enough effort to increase her heart rate 30 to 60 minutes per day.  She exercises with enough effort to increase her heart rate 3 or less days per week.   She is taking medications regularly.     Additional complaints: None  HPI additional notes: Azul presents today with   Chief Complaint   Patient presents with     Asthma            Review of Systems   Constitutional, HEENT, cardiovascular, pulmonary, gi and gu systems are negative, except as otherwise noted.      Objective    /84 (BP Location: Right arm, Patient Position: Sitting, Cuff Size: Adult Regular)   Pulse 90   Temp 98.2  F (36.8  C) (Oral)   Resp 18   Ht 1.626 m (5' 4\")   Wt 90.9 kg (200 lb 4.8 oz)   SpO2 95%   BMI 34.38 kg/m    Body mass index is 34.38 kg/m .  Physical Exam     Physical Exam   GENERAL: healthy, alert, in no acute distress  EYES: Grossly normal to inspection, EOMI, PERRL  NECK: Non-tender, no adenopathy.  RESP: no rales or rhonchi and expiratory wheezes bilateral  CV: regular rate and rhythm, normal S1 S2. No peripheral edema.  SKIN: no suspicious lesions, no rashes  PSYCH: Alert and oriented times " 3;  Able to articulate logical thoughts. Affect is normal.

## 2022-03-09 ASSESSMENT — ANXIETY QUESTIONNAIRES: GAD7 TOTAL SCORE: 17

## 2022-03-09 ASSESSMENT — PATIENT HEALTH QUESTIONNAIRE - PHQ9: SUM OF ALL RESPONSES TO PHQ QUESTIONS 1-9: 20

## 2022-03-20 ENCOUNTER — HEALTH MAINTENANCE LETTER (OUTPATIENT)
Age: 27
End: 2022-03-20

## 2022-03-24 ENCOUNTER — NURSE TRIAGE (OUTPATIENT)
Dept: FAMILY MEDICINE | Facility: CLINIC | Age: 27
End: 2022-03-24
Payer: COMMERCIAL

## 2022-03-24 NOTE — TELEPHONE ENCOUNTER
"Patient is offered 2 appointments this morning.  States she cannot get in until after 5:00 due to work.  She is informed she will have to go to the urgent care in [place] when she is done with work as she should be seen today.    Patient understands and agrees to this plan.      Reason for Disposition    Patient wants to be seen    Additional Information    Negative: Breathing stopped and hasn't returned    Negative: Choking on something    Negative: SEVERE difficulty breathing (e.g., struggling for each breath, speaks in single words, pulse > 120)    Negative: Bluish (or gray) lips or face    Negative: Difficult to awaken or acting confused (e.g., disoriented, slurred speech)    Negative: Passed out (i.e., fainted, collapsed and was not responding)    Negative: Wheezing started suddenly after medicine, an allergic food, or bee sting    Negative: Stridor    Negative: Slow, shallow and weak breathing    Negative: Sounds like a life-threatening emergency to the triager    Negative: Chest pain    Negative: Wheezing (high pitched whistling sound) and previous asthma attacks or use of asthma medicines    Negative: Difficulty breathing and only present when coughing    Negative: Difficulty breathing and only from stuffy or runny nose    Negative: Difficulty breathing and within 14 days of COVID-19 Exposure    Negative: MODERATE difficulty breathing (e.g., speaks in phrases, SOB even at rest, pulse 100-120) of new onset or worse than normal    Negative: Wheezing can be heard across the room    Negative: Drooling or spitting out saliva (because can't swallow)    Negative: Any history of prior \"blood clot\" in leg or lungs    Negative: Illness requiring prolonged bedrest in past month (e.g., immobilization, long hospital stay)    Negative: Hip or leg fracture (broken bone) in past month (or had cast on leg or ankle in past month)    Negative: Major surgery in the past month    Negative: Long-distance travel in past month " "(e.g., car, bus, train, plane; with trip lasting 6 or more hours)    Negative: Extra heart beats OR irregular heart beating   (i.e., \"palpitations\")    Negative: Fever > 103 F (39.4 C)    Negative: Fever > 101 F (38.3 C) and over 60 years of age    Negative: Fever > 100.0 F (37.8 C) and bedridden (e.g., nursing home patient, stroke, chronic illness, recovering from surgery)    Negative: Fever > 100.0 F (37.8 C) and diabetes mellitus or weak immune system (e.g., HIV positive, cancer chemo, splenectomy, organ transplant, chronic steroids)    Negative: Periods where breathing stops and then resumes normally and bedridden (e.g., nursing home patient, CVA)    Negative: Pregnant or postpartum (from 0 to 6 weeks after delivery)    Negative: Patient sounds very sick or weak to the triager    Protocols used: BREATHING DIFFICULTY-A-OH      "

## 2022-04-02 ENCOUNTER — OFFICE VISIT (OUTPATIENT)
Dept: URGENT CARE | Facility: URGENT CARE | Age: 27
End: 2022-04-02
Payer: COMMERCIAL

## 2022-04-02 VITALS
DIASTOLIC BLOOD PRESSURE: 80 MMHG | OXYGEN SATURATION: 96 % | TEMPERATURE: 97.7 F | HEART RATE: 98 BPM | SYSTOLIC BLOOD PRESSURE: 107 MMHG | RESPIRATION RATE: 16 BRPM

## 2022-04-02 DIAGNOSIS — R06.02 SHORTNESS OF BREATH: ICD-10-CM

## 2022-04-02 DIAGNOSIS — J45.31 MILD PERSISTENT ASTHMA WITH EXACERBATION: Primary | ICD-10-CM

## 2022-04-02 PROCEDURE — 99214 OFFICE O/P EST MOD 30 MIN: CPT | Mod: 25 | Performed by: NURSE PRACTITIONER

## 2022-04-02 PROCEDURE — 94640 AIRWAY INHALATION TREATMENT: CPT | Performed by: NURSE PRACTITIONER

## 2022-04-02 RX ORDER — IPRATROPIUM BROMIDE AND ALBUTEROL SULFATE 2.5; .5 MG/3ML; MG/3ML
1 SOLUTION RESPIRATORY (INHALATION) EVERY 4 HOURS PRN
Qty: 90 ML | Refills: 1 | Status: SHIPPED | OUTPATIENT
Start: 2022-04-02 | End: 2022-05-03

## 2022-04-02 RX ORDER — IPRATROPIUM BROMIDE AND ALBUTEROL SULFATE 2.5; .5 MG/3ML; MG/3ML
3 SOLUTION RESPIRATORY (INHALATION) ONCE
Status: COMPLETED | OUTPATIENT
Start: 2022-04-02 | End: 2022-04-02

## 2022-04-02 RX ORDER — PREDNISONE 20 MG/1
20 TABLET ORAL DAILY
Qty: 5 TABLET | Refills: 0 | Status: SHIPPED | OUTPATIENT
Start: 2022-04-02 | End: 2022-04-07

## 2022-04-02 RX ADMIN — IPRATROPIUM BROMIDE AND ALBUTEROL SULFATE 3 ML: 2.5; .5 SOLUTION RESPIRATORY (INHALATION) at 10:09

## 2022-04-02 NOTE — PROGRESS NOTES
Clinic Administered Medication Documentation    Administrations This Visit     ipratropium - albuterol 0.5 mg/2.5 mg/3 mL (DUONEB) neb solution 3 mL     Admin Date  04/02/2022 Action  Given Dose  3 mL Route  Nebulization Site   Administered By  Luisa Celaya MA    Ordering Provider: Katherine Raya APRN CNP    Patient Supplied?: No                Inhalable/Nebs Medication Documentation    Patient was given Ipratropium-Albuterol Neb. Prior to medication administration, verified patients identity using patient s name and date of birth. Please see MAR and medication order for additional information.     Expiration Date:  05/2023  Luisa Celaya MA on 4/2/2022 at 10:10 AM

## 2022-04-02 NOTE — PROGRESS NOTES
Assessment & Plan     Mild persistent asthma with exacerbation  Some relief with duoneb given in the clinic.    Will start prednisone burst.    F/u with PCP if persists or worsens.    Refilled duoneb for home use  - ipratropium - albuterol 0.5 mg/2.5 mg/3 mL (DUONEB) neb solution 3 mL  - predniSONE (DELTASONE) 20 MG tablet  Dispense: 5 tablet; Refill: 0    Shortness of breath  - ipratropium - albuterol 0.5 mg/2.5 mg/3 mL (DUONEB) 0.5-2.5 (3) MG/3ML neb solution  Dispense: 90 mL; Refill: 1      Return in about 2 days (around 4/4/2022) for with regular provider if symptoms persist.    [facility] OCHOA Liang is a 26 year old female who presents to clinic today for the following health issues:  Chief Complaint   Patient presents with     Urgent Care     Asthma     Patient thinks her asthma is flaring up-Wheezing and cough-patient was recently dx w/bronchitis      HPI      URI Adult    Onset of symptoms was 2 week(s) ago.  Course of illness is waxing and waning.    Severity moderate  Current and Associated symptoms: cough - non-productive, wheezing and shortness of breath  Treatment measures tried include Inhaler (name: proair and duoneb), Fluids and Rest.  Predisposing factors include HX of asthma.    Is out of duoneb at home, needs a refill.        Review of Systems  Constitutional, HEENT, cardiovascular, pulmonary, GI, , musculoskeletal, neuro, skin, endocrine and psych systems are negative, except as otherwise noted.      Objective    /80   Pulse 98   Temp 97.7  F (36.5  C) (Oral)   Resp 16   LMP  (LMP Unknown)   SpO2 96%   Breastfeeding No   Physical Exam   GENERAL: healthy, alert and no distress  NECK: no adenopathy, no asymmetry, masses, or scars and thyroid normal to palpation  RESP: no rales , no rhonchi, expiratory wheezes throughout and decreased breath sounds bibasilar  CV: regular rate and rhythm, normal S1 S2, no S3 or S4,  no murmur, click or rub, no peripheral edema and peripheral pulses strong  ABDOMEN: soft, nontender, no hepatosplenomegaly, no masses and bowel sounds normal  MS: no gross musculoskeletal defects noted, no edema

## 2022-04-28 ENCOUNTER — TELEPHONE (OUTPATIENT)
Dept: FAMILY MEDICINE | Facility: CLINIC | Age: 27
End: 2022-04-28
Payer: COMMERCIAL

## 2022-04-28 NOTE — TELEPHONE ENCOUNTER
Summary:    Patient is due/failing the following:   ACT    Reviewed:  [] CARE EVERYWHERE  [] LAST OV NOTE INCLUDING ENDO  [] FYI TAB  [] MYCHART ACTIVE?  [] LAST PANEL ENCOUNTER  [] FUTURE APPTS  [] IMMUNIZATIONS          Action needed:   Patient needs to do ACT.    Type of outreach:    [facility] [employer].                                                                               Kala Adair/SHERINE  [place]---[facility]

## 2022-05-02 NOTE — PROGRESS NOTES
Pre-Visit Planning   Next 5 appointments (look out 90 days)    May 03, 2022  2:00 PM  (Arrive by 1:40 PM)  Provider Visit with Chapis Maria MD  [facility] ([facility] ) [address] [address] [facility] [address] [phone]        Appointment Notes for this encounter:   ASTHMA CHECK    Questionnaires Reviewed/Assigned  No additional questionnaires are needed    Patient preferred phone number: [phone]    Unable to reach. Left voicemail. Advised patient to call clinic back at [phone].

## 2022-05-03 ENCOUNTER — OFFICE VISIT (OUTPATIENT)
Dept: FAMILY MEDICINE | Facility: CLINIC | Age: 27
End: 2022-05-03
Payer: COMMERCIAL

## 2022-05-03 VITALS
BODY MASS INDEX: 35.22 KG/M2 | HEIGHT: 64 IN | TEMPERATURE: 98.2 F | SYSTOLIC BLOOD PRESSURE: 120 MMHG | HEART RATE: 106 BPM | RESPIRATION RATE: 20 BRPM | OXYGEN SATURATION: 96 % | DIASTOLIC BLOOD PRESSURE: 74 MMHG | WEIGHT: 206.3 LBS

## 2022-05-03 DIAGNOSIS — E66.01 MORBID OBESITY (H): ICD-10-CM

## 2022-05-03 DIAGNOSIS — R06.02 SHORTNESS OF BREATH: ICD-10-CM

## 2022-05-03 DIAGNOSIS — J45.41 MODERATE PERSISTENT ASTHMA WITH ACUTE EXACERBATION: ICD-10-CM

## 2022-05-03 DIAGNOSIS — G43.009 MIGRAINE WITHOUT AURA AND WITHOUT STATUS MIGRAINOSUS, NOT INTRACTABLE: ICD-10-CM

## 2022-05-03 PROCEDURE — 99214 OFFICE O/P EST MOD 30 MIN: CPT | Performed by: FAMILY MEDICINE

## 2022-05-03 PROCEDURE — 93000 ELECTROCARDIOGRAM COMPLETE: CPT | Performed by: FAMILY MEDICINE

## 2022-05-03 RX ORDER — PREDNISONE 20 MG/1
20 TABLET ORAL DAILY
Qty: 5 TABLET | Refills: 0 | Status: SHIPPED | OUTPATIENT
Start: 2022-05-03 | End: 2022-05-08

## 2022-05-03 RX ORDER — MONTELUKAST SODIUM 10 MG/1
10 TABLET ORAL AT BEDTIME
Qty: 90 TABLET | Refills: 0 | Status: SHIPPED | OUTPATIENT
Start: 2022-05-03

## 2022-05-03 RX ORDER — FLUTICASONE PROPIONATE AND SALMETEROL XINAFOATE 230; 21 UG/1; UG/1
2 AEROSOL, METERED RESPIRATORY (INHALATION) 2 TIMES DAILY
Qty: 12 G | Refills: 3 | Status: SHIPPED | OUTPATIENT
Start: 2022-05-03

## 2022-05-03 RX ORDER — IPRATROPIUM BROMIDE AND ALBUTEROL SULFATE 2.5; .5 MG/3ML; MG/3ML
1 SOLUTION RESPIRATORY (INHALATION) EVERY 4 HOURS PRN
Qty: 90 ML | Refills: 1 | Status: SHIPPED | OUTPATIENT
Start: 2022-05-03 | End: 2022-08-22

## 2022-05-03 RX ORDER — SUMATRIPTAN 50 MG/1
50 TABLET, FILM COATED ORAL
Qty: 9 TABLET | Refills: 1 | Status: SHIPPED | OUTPATIENT
Start: 2022-05-03

## 2022-05-03 ASSESSMENT — PATIENT HEALTH QUESTIONNAIRE - PHQ9
10. IF YOU CHECKED OFF ANY PROBLEMS, HOW DIFFICULT HAVE THESE PROBLEMS MADE IT FOR YOU TO DO YOUR WORK, TAKE CARE OF THINGS AT HOME, OR GET ALONG WITH OTHER PEOPLE: VERY DIFFICULT
SUM OF ALL RESPONSES TO PHQ QUESTIONS 1-9: 26
SUM OF ALL RESPONSES TO PHQ QUESTIONS 1-9: 26

## 2022-05-03 ASSESSMENT — ANXIETY QUESTIONNAIRES
GAD7 TOTAL SCORE: 21
6. BECOMING EASILY ANNOYED OR IRRITABLE: NEARLY EVERY DAY
7. FEELING AFRAID AS IF SOMETHING AWFUL MIGHT HAPPEN: NEARLY EVERY DAY
GAD7 TOTAL SCORE: 21
1. FEELING NERVOUS, ANXIOUS, OR ON EDGE: NEARLY EVERY DAY
GAD7 TOTAL SCORE: 21
2. NOT BEING ABLE TO STOP OR CONTROL WORRYING: NEARLY EVERY DAY
3. WORRYING TOO MUCH ABOUT DIFFERENT THINGS: NEARLY EVERY DAY
5. BEING SO RESTLESS THAT IT IS HARD TO SIT STILL: NEARLY EVERY DAY
4. TROUBLE RELAXING: NEARLY EVERY DAY
7. FEELING AFRAID AS IF SOMETHING AWFUL MIGHT HAPPEN: NEARLY EVERY DAY

## 2022-05-03 ASSESSMENT — ENCOUNTER SYMPTOMS
ABDOMINAL DISTENTION: 0
HEADACHES: 0
PALPITATIONS: 0
ARTHRALGIAS: 0
SHORTNESS OF BREATH: 1
WHEEZING: 1
DYSURIA: 0
ABDOMINAL PAIN: 0
AGITATION: 0

## 2022-05-03 ASSESSMENT — ASTHMA QUESTIONNAIRES
QUESTION_1 LAST FOUR WEEKS HOW MUCH OF THE TIME DID YOUR ASTHMA KEEP YOU FROM GETTING AS MUCH DONE AT WORK, SCHOOL OR AT HOME: MOST OF THE TIME
QUESTION_5 LAST FOUR WEEKS HOW WOULD YOU RATE YOUR ASTHMA CONTROL: POORLY CONTROLLED
QUESTION_3 LAST FOUR WEEKS HOW OFTEN DID YOUR ASTHMA SYMPTOMS (WHEEZING, COUGHING, SHORTNESS OF BREATH, CHEST TIGHTNESS OR PAIN) WAKE YOU UP AT NIGHT OR EARLIER THAN USUAL IN THE MORNING: FOUR OR MORE NIGHTS A WEEK
ACT_TOTALSCORE: 7
QUESTION_2 LAST FOUR WEEKS HOW OFTEN HAVE YOU HAD SHORTNESS OF BREATH: MORE THAN ONCE A DAY
QUESTION_4 LAST FOUR WEEKS HOW OFTEN HAVE YOU USED YOUR RESCUE INHALER OR NEBULIZER MEDICATION (SUCH AS ALBUTEROL): THREE OR MORE TIMES PER DAY
ACT_TOTALSCORE: 7

## 2022-05-03 NOTE — PROGRESS NOTES
"  Assessment & Plan     Moderate persistent asthma with acute exacerbation  - Discussed asthma treatment .  Not using steroid inhaler   Recommend to start Advair  2 puff twice a day .     Recommend to continue combiivent     - montelukast (SINGULAIR) 10 MG tablet; Take 1 tablet (10 mg) by mouth At Bedtime    - Recommend to stop smoking , vaping , Marihuana use .       Shortness of breath  - ipratropium - albuterol 0.5 mg/2.5 mg/3 mL (DUONEB) 0.5-2.5 (3) MG/3ML neb solution; Take 1 vial (3 mLs) by nebulization every 4 hours as needed for shortness of breath / dyspnea or wheezing  - EKG 12-lead complete w/read - Clinics  - EKG 12-lead complete w/read - Clinics  - no acute changes .    Will recommend prednisone 20 mg for 5 days .  Discussed medication side effect .  Discussed ER visit with uncontrolled asthma or shortness of breath .    Morbid obesity (H)  - discussed diet and exercise .    Migraine without aura and without status migrainosus, not intractable  - SUMAtriptan (IMITREX) 50 MG tablet; Take 1 tablet (50 mg) by mouth at onset of headache for migraine May repeat in 2 hrs.  Max 200 mg in 24 hrs      Right ankle pain following injury -   Discussed rest , ice , compression .    BMI:   Estimated body mass index is 35.41 kg/m  as calculated from the following:    Height as of this encounter: 1.626 m (5' 4\").    Weight as of this encounter: 93.6 kg (206 lb 4.8 oz).   Weight management plan: Discussed healthy diet and exercise guidelines  [id number]}  Depression Screening Follow Up    PHQ 5/3/2022   PHQ-9 Total Score 26   Q9: Thoughts of better off dead/self-harm past 2 weeks More than half the days   F/U: Thoughts of suicide or self-harm No   F/U: Self harm-plan -   F/U: Self-harm action -   F/U: Safety concerns No                 Follow Up  Follow Up Actions Taken  Recommend to follow up with PCP .   Discussed relaxation activity     Discussed the following ways the patient can remain in a safe environment:  " "    Return in about 5 days (around 5/8/2022) for Follow up.    Chapis Maria MD  [facility] OCHOA Liang is a 26 year old who presents for the following health issues  accompanied by her Boyfriend.    HPI         Asthma: Patient was improving but now is having increased troubles lately. Patient has trouble breathing during exercise. Humidity and allergens seem to make it worse.       Asthma uncontrolled , feeling tight in chest .      Review of Systems   Respiratory: Positive for shortness of breath and wheezing.    Cardiovascular: Negative for chest pain, palpitations and peripheral edema.   Gastrointestinal: Negative for abdominal distention and abdominal pain.   Genitourinary: Negative for dysuria.   Musculoskeletal: Negative for arthralgias.   Neurological: Negative for headaches.   Psychiatric/Behavioral: Negative for agitation and behavioral problems.            Objective    /74 (BP Location: Right arm, Patient Position: Sitting, Cuff Size: Adult Regular)   Pulse 106   Temp 98.2  F (36.8  C) (Oral)   Resp 20   Ht 1.626 m (5' 4\")   Wt 93.6 kg (206 lb 4.8 oz)   LMP  (LMP Unknown)   SpO2 96%   BMI 35.41 kg/m    Body mass index is 35.41 kg/m .  Physical Exam  Pulmonary:      Effort: Pulmonary effort is normal.      Breath sounds: Wheezing present. No rales.      Comments: Prolonged expiration and wheezing .  Chest:      Chest wall: No tenderness.   Musculoskeletal:         General: Normal range of motion.   Skin:     General: Skin is warm.   Neurological:      General: No focal deficit present.   Psychiatric:         Mood and Affect: Mood normal.                "

## 2022-05-04 ASSESSMENT — ANXIETY QUESTIONNAIRES: GAD7 TOTAL SCORE: 21

## 2022-05-04 ASSESSMENT — PATIENT HEALTH QUESTIONNAIRE - PHQ9: SUM OF ALL RESPONSES TO PHQ QUESTIONS 1-9: 26

## 2022-05-09 NOTE — PROGRESS NOTES
Pre-Visit Planning   Next 5 appointments (look out 90 days)    May 10, 2022  2:30 PM  (Arrive by 2:10 PM)  Provider Visit with Chapis Maria MD  [facility] ([facility] ) [address] [facility] [address] [phone]        Appointment Notes for this encounter:   asthma follow up    Questionnaires Reviewed/Assigned  No additional questionnaires are needed    Patient preferred phone number: [phone]    Unable to reach. Left voicemail. Advised patient to call clinic back at [phone].

## 2022-05-10 ENCOUNTER — OFFICE VISIT (OUTPATIENT)
Dept: FAMILY MEDICINE | Facility: CLINIC | Age: 27
End: 2022-05-10
Payer: COMMERCIAL

## 2022-05-10 VITALS
HEIGHT: 64 IN | RESPIRATION RATE: 18 BRPM | WEIGHT: 204.9 LBS | BODY MASS INDEX: 34.98 KG/M2 | TEMPERATURE: 97.9 F | SYSTOLIC BLOOD PRESSURE: 124 MMHG | OXYGEN SATURATION: 98 % | DIASTOLIC BLOOD PRESSURE: 82 MMHG | HEART RATE: 94 BPM

## 2022-05-10 DIAGNOSIS — F32.A DEPRESSION, UNSPECIFIED DEPRESSION TYPE: ICD-10-CM

## 2022-05-10 DIAGNOSIS — J45.30 MILD PERSISTENT ASTHMA, UNSPECIFIED WHETHER COMPLICATED: Primary | ICD-10-CM

## 2022-05-10 PROCEDURE — 99213 OFFICE O/P EST LOW 20 MIN: CPT | Performed by: FAMILY MEDICINE

## 2022-05-10 RX ORDER — FLUTICASONE PROPIONATE AND SALMETEROL XINAFOATE 230; 21 UG/1; UG/1
2 AEROSOL, METERED RESPIRATORY (INHALATION) 2 TIMES DAILY
Qty: 12 G | Refills: 3 | Status: CANCELLED | OUTPATIENT
Start: 2022-05-10

## 2022-05-10 ASSESSMENT — PATIENT HEALTH QUESTIONNAIRE - PHQ9
SUM OF ALL RESPONSES TO PHQ QUESTIONS 1-9: 25
10. IF YOU CHECKED OFF ANY PROBLEMS, HOW DIFFICULT HAVE THESE PROBLEMS MADE IT FOR YOU TO DO YOUR WORK, TAKE CARE OF THINGS AT HOME, OR GET ALONG WITH OTHER PEOPLE: SOMEWHAT DIFFICULT
SUM OF ALL RESPONSES TO PHQ QUESTIONS 1-9: 25

## 2022-05-10 ASSESSMENT — ANXIETY QUESTIONNAIRES
3. WORRYING TOO MUCH ABOUT DIFFERENT THINGS: NEARLY EVERY DAY
GAD7 TOTAL SCORE: 21
5. BEING SO RESTLESS THAT IT IS HARD TO SIT STILL: NEARLY EVERY DAY
7. FEELING AFRAID AS IF SOMETHING AWFUL MIGHT HAPPEN: NEARLY EVERY DAY
1. FEELING NERVOUS, ANXIOUS, OR ON EDGE: NEARLY EVERY DAY
GAD7 TOTAL SCORE: 21
7. FEELING AFRAID AS IF SOMETHING AWFUL MIGHT HAPPEN: NEARLY EVERY DAY
6. BECOMING EASILY ANNOYED OR IRRITABLE: NEARLY EVERY DAY
2. NOT BEING ABLE TO STOP OR CONTROL WORRYING: NEARLY EVERY DAY
4. TROUBLE RELAXING: NEARLY EVERY DAY
8. IF YOU CHECKED OFF ANY PROBLEMS, HOW DIFFICULT HAVE THESE MADE IT FOR YOU TO DO YOUR WORK, TAKE CARE OF THINGS AT HOME, OR GET ALONG WITH OTHER PEOPLE?: SOMEWHAT DIFFICULT
GAD7 TOTAL SCORE: 21

## 2022-05-10 ASSESSMENT — ASTHMA QUESTIONNAIRES: ACT_TOTALSCORE: 12

## 2022-05-10 NOTE — PROGRESS NOTES
Assessment & Plan     Mild persistent asthma, unspecified whether complicated  Symptoms improved .  Exam normal   No wheezing   Recommend to continue advair .    Depression, unspecified depression type  Discussed medication option deferred by patient .  Patient has a therapist will follow with therapist .    Recommend ER visit with uncontrolled symptoms .    Depression Screening Follow Up    PHQ 5/10/2022   PHQ-9 Total Score 25   Q9: Thoughts of better off dead/self-harm past 2 weeks Several days   F/U: Thoughts of suicide or self-harm No   F/U: Self harm-plan -   F/U: Self-harm action -   F/U: Safety concerns No         Follow Up      Follow Up Actions Taken  Crisis resource information provided in the After Visit Summary  Patient declined referral.    Discussed the following ways the patient can remain in a safe environment:  remove alcohol and remove drugs      Return in about 3 months (around 8/10/2022) for patient will call.    Chapis Maria MD  [facility] OCHOA Liang is a 26 year old who presents for the following health issues {ACCOMPANIED BY STATEMENT     History of Present Illness     Asthma:  She presents for follow up of asthma.  She has some cough, some wheezing, and some shortness of breath. She is using a relief medication 2-3 times per day. She does not miss any doses of her controller medication throughout the week.Patient is aware of the following triggers: animal dander, cold air, emotions, exercise or sports, humidity, insects/rodents, mold, pollens, smoke, strong odors and fumes and upper respiratory infections. The patient has not had a visit to the Emergency Room, Urgent Care or Hospital due to asthma since the last clinic visit.     Back Pain:  She presents for follow up of back pain. Patient's back pain is a chronic problem.  Location of back pain:  Right lower back, left lower back, right middle of back, left middle of back, right upper back, left upper  back, right shoulder, left shoulder, right hip and left hip  Description of back pain: burning, cramping, dull ache, gnawing, sharp and stabbing  Back pain spreads: right buttocks, left buttocks, right shoulder and left shoulder    Since patient first noticed back pain, pain is: always present, but gets better and worse  Does back pain interfere with her job:  Yes      Mental Health Follow-up:  Patient presents to follow-up on Depression & Anxiety.Patient's depression since last visit has been:  No change  The patient is not having other symptoms associated with depression.  Patient's anxiety since last visit has been:  Medium  The patient is not having other symptoms associated with anxiety.  Any significant life events: financial concerns, housing concerns and health concerns  Patient is feeling anxious or having panic attacks.  Patient has no concerns about alcohol or drug use.       Today's PHQ-9         PHQ-9 Total Score: 25  PHQ-9 Q9 Thoughts of better off dead/self-harm past 2 weeks :   (P) Several days  Thoughts of suicide or self harm: (P) No  Self-harm Plan:     Self-harm Action:       Safety concerns for self or others: (P) No    How difficult have these problems made it for you to do your work, take care of things at home, or get along with other people: Somewhat difficult    Today's KARLY-7 Score: 21    She eats 0-1 servings of fruits and vegetables daily.She consumes 6 sweetened beverage(s) daily.She exercises with enough effort to increase her heart rate 20 to 29 minutes per day.  She exercises with enough effort to increase her heart rate 3 or less days per week.   She is taking medications regularly.               Still having consistent migraines:        Review of Systems   Respiratory: Negative for cough and shortness of breath.    Neurological: Positive for headaches.   Psychiatric/Behavioral: Positive for agitation and dysphoric mood.            Objective    /82 (BP Location: Right arm,  "Patient Position: Sitting, Cuff Size: Adult Regular)   Pulse 94   Temp 97.9  F (36.6  C) (Oral)   Resp 18   Ht 1.626 m (5' 4\")   Wt 92.9 kg (204 lb 14.4 oz)   LMP  (LMP Unknown)   SpO2 98%   BMI 35.17 kg/m    Body mass index is 35.17 kg/m .  Physical Exam  Constitutional:       Appearance: Normal appearance.   Cardiovascular:      Rate and Rhythm: Normal rate.   Pulmonary:      Effort: Pulmonary effort is normal. No respiratory distress.      Breath sounds: Normal breath sounds. No wheezing or rales.   Abdominal:      General: Abdomen is flat.   Musculoskeletal:         General: Normal range of motion.   Neurological:      General: No focal deficit present.      Mental Status: She is alert.   Psychiatric:         Mood and Affect: Mood normal.                "

## 2022-05-11 ASSESSMENT — ENCOUNTER SYMPTOMS
DYSPHORIC MOOD: 1
COUGH: 0
SHORTNESS OF BREATH: 0
AGITATION: 1
HEADACHES: 1

## 2022-05-11 ASSESSMENT — PATIENT HEALTH QUESTIONNAIRE - PHQ9: SUM OF ALL RESPONSES TO PHQ QUESTIONS 1-9: 25

## 2022-05-11 ASSESSMENT — ANXIETY QUESTIONNAIRES: GAD7 TOTAL SCORE: 21

## 2022-08-22 DIAGNOSIS — R06.02 SHORTNESS OF BREATH: ICD-10-CM

## 2022-08-22 RX ORDER — IPRATROPIUM BROMIDE AND ALBUTEROL SULFATE 2.5; .5 MG/3ML; MG/3ML
1 SOLUTION RESPIRATORY (INHALATION) EVERY 4 HOURS PRN
Qty: 90 ML | Refills: 1 | Status: SHIPPED | OUTPATIENT
Start: 2022-08-22

## 2022-08-22 NOTE — TELEPHONE ENCOUNTER
Routing refill request to provider for review/approval because:  ACT   Team please call to schedule  asthma follow up visit     Aurora Guerrero RN

## 2022-09-10 ENCOUNTER — HEALTH MAINTENANCE LETTER (OUTPATIENT)
Age: 27
End: 2022-09-10

## 2022-11-05 ENCOUNTER — VIRTUAL VISIT (OUTPATIENT)
Dept: URGENT CARE | Facility: CLINIC | Age: 27
End: 2022-11-05
Payer: COMMERCIAL

## 2022-11-05 DIAGNOSIS — U07.1 COVID-19: Primary | ICD-10-CM

## 2022-11-05 DIAGNOSIS — Z30.9 ENCOUNTER FOR CONTRACEPTIVE MANAGEMENT, UNSPECIFIED TYPE: ICD-10-CM

## 2022-11-05 DIAGNOSIS — G43.811 OTHER MIGRAINE WITH STATUS MIGRAINOSUS, INTRACTABLE: ICD-10-CM

## 2022-11-05 PROCEDURE — 99213 OFFICE O/P EST LOW 20 MIN: CPT | Mod: CS

## 2022-11-05 RX ORDER — NORETHINDRONE ACETATE AND ETHINYL ESTRADIOL 1MG-20(21)
1 KIT ORAL DAILY
Qty: 30 TABLET | Refills: 0 | Status: SHIPPED | OUTPATIENT
Start: 2022-11-05

## 2022-11-05 RX ORDER — SUMATRIPTAN 50 MG/1
50 TABLET, FILM COATED ORAL
Qty: 10 TABLET | Refills: 0 | Status: SHIPPED | OUTPATIENT
Start: 2022-11-05

## 2022-11-05 NOTE — PROGRESS NOTES
"Azul is a 27 year old who is being evaluated via a billable telephone visit.        Assessment & Plan     (U07.1) COVID-19  (primary encounter diagnosis)  Plan: molnupiravir (LAGEVRIO) 200 MG capsule  Isolate and manage with monitor symptoms home treat    (Z30.9) Encounter for contraceptive management, unspecified type    Plan: Requestsrefill  norethindrone-ethinyl estradiol (JUNEL FE 1/20)1-20 MG-MCG tablet    (G43.811) Other migraine with status migrainosus, intractable  Plan: SUMAtriptan (IMITREX) 50 MG tablet refilled      BMI:   Estimated body mass index is 35.17 kg/m  as calculated from the following:    Height as of 5/10/22: 1.626 m (5' 4\").    Weight as of 5/10/22: 92.9 kg (204 lb 14.4 oz).   Weight management plan: Patient was referred to their PCP to discuss a diet and exercise plan.    PLAN:  High risk due to BMI and asthma  Will take rx molnupiravir  Refilled migraine medication and birth control    Mary Vargas, CHAD CNP  [facility]    Subjective   Azul is a 27 year old presenting for the following health issues:  Covid treatment    HPI   Tested positive last night  Symptoms started 2 days ago and consist of cough runny nose sore throat headache body aches fatigue chills loss of tase.   No sob chest pain  Mild wheezing inhaler is helping (hx asthma)  Hydrated  Tx: ibuprofen    Objective         Vitals:  No vitals were obtained today due to virtual visit.    Physical Exam   GENERAL: Healthy, alert and no distress  RESP: No audible wheeze, cough  SKIN: No significant rash  PSYCH: Mentation appears normal\      Telephone time spent 20 minutes              "

## 2023-04-30 ENCOUNTER — HEALTH MAINTENANCE LETTER (OUTPATIENT)
Age: 28
End: 2023-04-30

## 2023-05-15 ENCOUNTER — TELEPHONE (OUTPATIENT)
Dept: FAMILY MEDICINE | Facility: CLINIC | Age: 28
End: 2023-05-15
Payer: COMMERCIAL

## 2023-05-15 NOTE — TELEPHONE ENCOUNTER
Summary:    Patient is due/failing the following:   PHQ9    Reviewed:    [] CARE EVERYWHERE  [] LAST OV NOTE   [] FYI TAB  [] MYCHART ACTIVE?  [] LAST PANEL ENCOUNTER  [] FUTURE APPTS  [] IMMUNIZATIONS  [] Media Tab            Action needed:   Patient needs to do PHQ9.    Type of outreach:    Sent MyChart message.                                                                               Kala Adair/SHERINE  [place]---[facility]

## 2023-08-10 ENCOUNTER — TELEPHONE (OUTPATIENT)
Dept: FAMILY MEDICINE | Facility: CLINIC | Age: 28
End: 2023-08-10
Payer: COMMERCIAL

## 2023-08-10 NOTE — TELEPHONE ENCOUNTER
Summary:    Patient is due/failing the following:   PHQ9    Reviewed:    [] CARE EVERYWHERE  [] LAST OV NOTE   [] FYI TAB  [] MYCHART ACTIVE?  [] LAST PANEL ENCOUNTER  [] FUTURE APPTS  [] IMMUNIZATIONS  [] Media Tab            Action needed:   Patient needs to do PHQ9.    Type of outreach:    Sent MyChart message.                                                                               Kala Adair/SHERINE  [place]---[facility]

## 2024-07-07 ENCOUNTER — HEALTH MAINTENANCE LETTER (OUTPATIENT)
Age: 29
End: 2024-07-07

## 2025-07-13 ENCOUNTER — HEALTH MAINTENANCE LETTER (OUTPATIENT)
Age: 30
End: 2025-07-13